# Patient Record
Sex: FEMALE | Race: WHITE | NOT HISPANIC OR LATINO | Employment: OTHER | URBAN - METROPOLITAN AREA
[De-identification: names, ages, dates, MRNs, and addresses within clinical notes are randomized per-mention and may not be internally consistent; named-entity substitution may affect disease eponyms.]

---

## 2023-02-23 LAB
APPEARANCE, URINE: CLEAR
BACTERIA, URINE: ABNORMAL /HPF
BILIRUBIN, URINE: NEGATIVE
BLOOD, URINE: ABNORMAL
COLOR, URINE: YELLOW
GLUCOSE, URINE: NEGATIVE MG/DL
KETONES, URINE: NEGATIVE MG/DL
LEUKOCYTE ESTERASE, URINE: ABNORMAL
NITRITE, URINE: NEGATIVE
PH, URINE: 6.5 (ref 5–8)
PROTEIN, URINE: NEGATIVE MG/DL
RBC, URINE: 1 /HPF (ref 0–5)
RENAL EPITHELIAL CELLS, URINE: <1 /HPF
SPECIFIC GRAVITY, URINE: 1.01 (ref 1–1.03)
UROBILINOGEN, URINE: <2 MG/DL (ref 0–1.9)
WBC CLUMPS, URINE: ABNORMAL /HPF
WBC, URINE: 29 /HPF (ref 0–5)

## 2023-02-25 LAB — URINE CULTURE: ABNORMAL

## 2023-03-08 PROBLEM — M54.12 CERVICAL RADICULITIS: Status: ACTIVE | Noted: 2023-03-08

## 2023-03-08 PROBLEM — N76.0 BACTERIAL VAGINOSIS: Status: ACTIVE | Noted: 2023-03-08

## 2023-03-08 PROBLEM — R00.2 PALPITATIONS: Status: ACTIVE | Noted: 2023-03-08

## 2023-03-08 PROBLEM — N20.0 KIDNEY STONES, CALCIUM OXALATE: Status: ACTIVE | Noted: 2023-03-08

## 2023-03-08 PROBLEM — S62.024A CLOSED NONDISPLACED FRACTURE OF MIDDLE THIRD OF SCAPHOID BONE OF RIGHT WRIST: Status: ACTIVE | Noted: 2023-03-08

## 2023-03-08 PROBLEM — R52 BODY ACHES: Status: ACTIVE | Noted: 2023-03-08

## 2023-03-08 PROBLEM — L03.90 WOUND CELLULITIS: Status: ACTIVE | Noted: 2023-03-08

## 2023-03-08 PROBLEM — S93.601A FOOT SPRAIN, RIGHT, INITIAL ENCOUNTER: Status: ACTIVE | Noted: 2023-03-08

## 2023-03-08 PROBLEM — J30.2 SEASONAL ALLERGIES: Status: ACTIVE | Noted: 2023-03-08

## 2023-03-08 PROBLEM — F43.10 PTSD (POST-TRAUMATIC STRESS DISORDER): Status: ACTIVE | Noted: 2023-03-08

## 2023-03-08 PROBLEM — G89.29 CHRONIC LEFT UPPER QUADRANT PAIN: Status: ACTIVE | Noted: 2023-03-08

## 2023-03-08 PROBLEM — J02.9 SORE THROAT: Status: ACTIVE | Noted: 2023-03-08

## 2023-03-08 PROBLEM — F33.1 MODERATE EPISODE OF RECURRENT MAJOR DEPRESSIVE DISORDER (MULTI): Status: ACTIVE | Noted: 2023-03-08

## 2023-03-08 PROBLEM — R10.2 PELVIC PAIN IN FEMALE: Status: ACTIVE | Noted: 2023-03-08

## 2023-03-08 PROBLEM — R45.86 MOOD CHANGES: Status: ACTIVE | Noted: 2023-03-08

## 2023-03-08 PROBLEM — M54.2 NECK PAIN: Status: ACTIVE | Noted: 2023-03-08

## 2023-03-08 PROBLEM — H00.011 HORDEOLUM EXTERNUM OF RIGHT UPPER EYELID: Status: ACTIVE | Noted: 2023-03-08

## 2023-03-08 PROBLEM — N39.0 UTI (URINARY TRACT INFECTION): Status: ACTIVE | Noted: 2023-03-08

## 2023-03-08 PROBLEM — F17.200 NICOTINE DEPENDENCE: Status: ACTIVE | Noted: 2023-03-08

## 2023-03-08 PROBLEM — N64.4 BREAST PAIN, LEFT: Status: ACTIVE | Noted: 2023-03-08

## 2023-03-08 PROBLEM — B37.0 ORAL THRUSH: Status: ACTIVE | Noted: 2023-03-08

## 2023-03-08 PROBLEM — E78.5 HYPERLIPIDEMIA: Status: ACTIVE | Noted: 2023-03-08

## 2023-03-08 PROBLEM — M25.531 ARTHRALGIA OF RIGHT WRIST: Status: ACTIVE | Noted: 2023-03-08

## 2023-03-08 PROBLEM — R51.9 HEADACHE: Status: ACTIVE | Noted: 2023-03-08

## 2023-03-08 PROBLEM — R92.30 DENSE BREAST TISSUE ON MAMMOGRAM: Status: ACTIVE | Noted: 2023-03-08

## 2023-03-08 PROBLEM — R39.89 POSSIBLE URINARY TRACT INFECTION: Status: ACTIVE | Noted: 2023-03-08

## 2023-03-08 PROBLEM — I95.9 HYPOTENSION: Status: ACTIVE | Noted: 2023-03-08

## 2023-03-08 PROBLEM — R05.9 COUGH: Status: ACTIVE | Noted: 2023-03-08

## 2023-03-08 PROBLEM — M79.603 ARM PAIN: Status: ACTIVE | Noted: 2023-03-08

## 2023-03-08 PROBLEM — N95.1 VASOMOTOR SYMPTOMS DUE TO MENOPAUSE: Status: ACTIVE | Noted: 2023-03-08

## 2023-03-08 PROBLEM — M47.812 CERVICAL FACET SYNDROME: Status: ACTIVE | Noted: 2023-03-08

## 2023-03-08 PROBLEM — R10.12 CHRONIC LEFT UPPER QUADRANT PAIN: Status: ACTIVE | Noted: 2023-03-08

## 2023-03-08 PROBLEM — B96.89 BACTERIAL VAGINOSIS: Status: ACTIVE | Noted: 2023-03-08

## 2023-03-08 PROBLEM — R59.1 LYMPHADENOPATHY: Status: ACTIVE | Noted: 2023-03-08

## 2023-03-08 PROBLEM — N63.20 LEFT BREAST MASS: Status: ACTIVE | Noted: 2023-03-08

## 2023-03-08 PROBLEM — R42 FEELING FAINT: Status: ACTIVE | Noted: 2023-03-08

## 2023-03-08 PROBLEM — F41.1 GENERALIZED ANXIETY DISORDER: Status: ACTIVE | Noted: 2023-03-08

## 2023-03-08 PROBLEM — R30.0 DYSURIA: Status: ACTIVE | Noted: 2023-03-08

## 2023-03-08 PROBLEM — B37.9 YEAST INFECTION: Status: ACTIVE | Noted: 2023-03-08

## 2023-03-08 PROBLEM — N93.9 ABNORMAL UTERINE BLEEDING: Status: ACTIVE | Noted: 2023-03-08
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
## 2023-03-08 PROBLEM — K63.89 BACTERIAL OVERGROWTH SYNDROME: Status: ACTIVE | Noted: 2023-03-08

## 2023-03-08 PROBLEM — G89.18 POST-OPERATIVE PAIN: Status: ACTIVE | Noted: 2023-03-08

## 2023-03-08 PROBLEM — H57.89 EYE IRRITATION: Status: ACTIVE | Noted: 2023-03-08

## 2023-03-08 PROBLEM — E55.9 VITAMIN D DEFICIENCY: Status: ACTIVE | Noted: 2023-03-08

## 2023-03-08 PROBLEM — N20.0 NEPHROLITHIASIS: Status: ACTIVE | Noted: 2023-03-08

## 2023-03-08 PROBLEM — N80.9 ENDOMETRIOSIS: Status: ACTIVE | Noted: 2023-03-08

## 2023-03-08 PROBLEM — L08.9 SKIN INFECTION: Status: ACTIVE | Noted: 2023-03-08

## 2023-03-08 PROBLEM — N97.0 ANOVULATION: Status: ACTIVE | Noted: 2023-03-08

## 2023-03-08 PROBLEM — R53.83 FATIGUE: Status: ACTIVE | Noted: 2023-03-08

## 2023-03-08 PROBLEM — M79.671 RIGHT FOOT PAIN: Status: ACTIVE | Noted: 2023-03-08

## 2023-03-08 PROBLEM — F12.90 MARIJUANA USE: Status: ACTIVE | Noted: 2023-03-08

## 2023-03-08 PROBLEM — R63.4 WEIGHT LOSS: Status: ACTIVE | Noted: 2023-03-08

## 2023-03-08 PROBLEM — T14.8XXA BRUISING: Status: ACTIVE | Noted: 2023-03-08

## 2023-03-08 PROBLEM — H00.019 STYE: Status: ACTIVE | Noted: 2023-03-08

## 2023-03-08 PROBLEM — R79.9 ABNORMAL BLOOD CHEMISTRY: Status: ACTIVE | Noted: 2023-03-08

## 2023-03-08 PROBLEM — L25.9 CONTACT DERMATITIS: Status: ACTIVE | Noted: 2023-03-08

## 2023-03-08 PROBLEM — J32.9 CHRONIC SINUS INFECTION: Status: ACTIVE | Noted: 2023-03-08

## 2023-03-08 PROBLEM — R10.9 PAIN, FLANK, BILATERAL: Status: ACTIVE | Noted: 2023-03-08

## 2023-03-08 PROBLEM — R11.0 NAUSEA IN ADULT: Status: ACTIVE | Noted: 2023-03-08

## 2023-03-08 PROBLEM — R55 SYNCOPE: Status: ACTIVE | Noted: 2023-03-08

## 2023-03-08 PROBLEM — M25.511 RIGHT SHOULDER PAIN: Status: ACTIVE | Noted: 2023-03-08

## 2023-03-08 PROBLEM — R26.89 BALANCE DISORDER: Status: ACTIVE | Noted: 2023-03-08

## 2023-03-08 PROBLEM — M62.838 NECK MUSCLE SPASM: Status: ACTIVE | Noted: 2023-03-08

## 2023-03-08 RX ORDER — IBUPROFEN 200 MG
TABLET ORAL
COMMUNITY
Start: 2018-08-09 | End: 2024-04-09 | Stop reason: WASHOUT

## 2023-03-08 RX ORDER — ATORVASTATIN CALCIUM 40 MG/1
1 TABLET, FILM COATED ORAL NIGHTLY
COMMUNITY
Start: 2020-12-04 | End: 2024-01-17 | Stop reason: SDUPTHER

## 2023-03-08 RX ORDER — ACETAMINOPHEN 325 MG/1
TABLET ORAL
COMMUNITY
Start: 2018-08-09 | End: 2024-04-09 | Stop reason: WASHOUT

## 2023-03-08 RX ORDER — HYDROXYZINE PAMOATE 50 MG/1
50 CAPSULE ORAL EVERY 6 HOURS PRN
COMMUNITY
End: 2023-11-22 | Stop reason: SDUPTHER

## 2023-03-08 RX ORDER — NITROFURANTOIN 25; 75 MG/1; MG/1
100 CAPSULE ORAL
COMMUNITY
End: 2023-10-18 | Stop reason: ALTCHOICE

## 2023-03-08 RX ORDER — ARIPIPRAZOLE 15 MG/1
1 TABLET ORAL DAILY
COMMUNITY
Start: 2020-06-08 | End: 2023-05-19 | Stop reason: SDUPTHER

## 2023-03-08 RX ORDER — CLONAZEPAM 1 MG/1
1 TABLET ORAL 3 TIMES DAILY PRN
COMMUNITY
Start: 2020-04-20 | End: 2023-11-22 | Stop reason: SDUPTHER

## 2023-03-10 ENCOUNTER — OFFICE VISIT (OUTPATIENT)
Dept: PRIMARY CARE | Facility: CLINIC | Age: 38
End: 2023-03-10
Payer: MEDICARE

## 2023-03-10 VITALS
SYSTOLIC BLOOD PRESSURE: 120 MMHG | BODY MASS INDEX: 24.11 KG/M2 | HEIGHT: 66 IN | HEART RATE: 105 BPM | DIASTOLIC BLOOD PRESSURE: 82 MMHG | WEIGHT: 150 LBS

## 2023-03-10 DIAGNOSIS — S06.0X0A CONCUSSION WITHOUT LOSS OF CONSCIOUSNESS, INITIAL ENCOUNTER: Primary | ICD-10-CM

## 2023-03-10 DIAGNOSIS — R11.0 NAUSEA: ICD-10-CM

## 2023-03-10 PROCEDURE — 99214 OFFICE O/P EST MOD 30 MIN: CPT | Performed by: STUDENT IN AN ORGANIZED HEALTH CARE EDUCATION/TRAINING PROGRAM

## 2023-03-10 RX ORDER — ONDANSETRON HYDROCHLORIDE 8 MG/1
8 TABLET, FILM COATED ORAL EVERY 12 HOURS PRN
Qty: 20 TABLET | Refills: 0 | Status: SHIPPED | OUTPATIENT
Start: 2023-03-10 | End: 2023-03-20

## 2023-03-10 NOTE — PROGRESS NOTES
"Subjective   Patient ID: Rosibel Staton is a 37 y.o. female who presents for Concussion.    HPI     At approximately 8 PM last night she was playing with her son, she tried to push him away with her leg, her son grabbed her leg and she fell backwards and hit her head on a table.  It has since been approximately 15 hours until she was seen in the office since the incident.  She did not pass out or vomit on impact or within 30 minutes after incident.  She does admit that she did vomit approximately 3 hours after the incident, and has since vomited 3 times in total.  She has noticed no blood in her vomit.  Admits to could feel nauseous consistently, she has head and neck pain.  She did not go to the emergency room.  She was able to sleep last night, waking up at 3 in the morning to go to the bathroom, she did vomit at that time.  She then went back to sleep and woke up at 8 AM this morning.  She vomited on waking.  Since then, whenever she looks at a screen, sees bright lights her symptoms return and worsen.    PMH: History of migraines, a prior concussion, and had CT scans done in the past.    Review of Systems  All pertinent positive symptoms are included in the history of present illness.     All other systems have been reviewed and are negative and noncontributory to this patient's current ailments.  Objective   /82 (BP Location: Left arm, Patient Position: Sitting, BP Cuff Size: Adult)   Pulse 105   Ht 1.676 m (5' 6\")   Wt 68 kg (150 lb)   BMI 24.21 kg/m²     Physical Exam  CONSTITUTIONAL - INAD. Not ill appearing.  SKIN - No lesions or rashes visualized. Good skin turgor.  HEENT- EACs clear, TMs are not injected and without effusion bilaterally, nasal turbinates are nonerythematous and without drainage, oropharynx is nonerythematous and without exudates.   HEAD -there is edema on the top left portion of her head, tender to palpation.  No ecchymosis visible.  Neck -mild tenderness just left of the " cervical spine.  RESP - CTAB. No wheezing, rhonchi, or crackles.   CARDIAC - RRR. No murmurs, gallops, or rubs.  Neuro - Cranial nerves II through XII are intact.  No numbness or tingling.  Deferred Romberg test she is uneasy on her feet.  Extremities -no numbness and tingling of her upper or lower extremities, strength 5 out of 5.  Psych -monotone in speech, poor affect    Assessment/Plan   We reviewed that individuals who suffer a concussion will be at increased likelihood for suffering additional concussions in the future. The family needs to be attentive to the individual should they hit their head again.     Treatment will include:    1.  Recommend to try and avoid medications at this time.  We did provide a prescription for Zofran 8 mg to take as needed.  If possible try not to take this medication.    2. Electronic restrictions: No video game, computer, tablet. Quiet television for no more than two 30 minute sessions per day is allowed. Limiting texting and cell phone use was also discussed.    3. No physical activity is allowed. No weight training, conditioning, stretching, or any other physical activity without supervision. This can exacerbate symptoms by increasing the blood flow to the brain. It will slow the recovery process, therefore it is not recommended at this time.    4. We reviewed good sleep habits including remaining on a good sleep schedule. This includes restricting daytime napping.    5. Sunglasses and a hat can be used for light sensitivity. Earplugs were recommended for noise sensitivity.    I like to have you follow-up in office in 1 week.    If you develop any numbness or tingling, worsening changes in your vision or symptoms please go to the emergency room immediately to be further evaluated.

## 2023-03-13 ENCOUNTER — APPOINTMENT (OUTPATIENT)
Dept: PRIMARY CARE | Facility: CLINIC | Age: 38
End: 2023-03-13
Payer: MEDICARE

## 2023-03-17 ENCOUNTER — OFFICE VISIT (OUTPATIENT)
Dept: PRIMARY CARE | Facility: CLINIC | Age: 38
End: 2023-03-17
Payer: MEDICARE

## 2023-03-17 VITALS
DIASTOLIC BLOOD PRESSURE: 76 MMHG | HEART RATE: 82 BPM | WEIGHT: 150 LBS | SYSTOLIC BLOOD PRESSURE: 112 MMHG | HEIGHT: 66 IN | BODY MASS INDEX: 24.11 KG/M2

## 2023-03-17 DIAGNOSIS — G89.29 CHRONIC ABDOMINAL PAIN: ICD-10-CM

## 2023-03-17 DIAGNOSIS — S06.0X0D CONCUSSION WITHOUT LOSS OF CONSCIOUSNESS, SUBSEQUENT ENCOUNTER: Primary | ICD-10-CM

## 2023-03-17 DIAGNOSIS — R14.0 BLOATING SYMPTOM: ICD-10-CM

## 2023-03-17 DIAGNOSIS — R10.9 CHRONIC ABDOMINAL PAIN: ICD-10-CM

## 2023-03-17 PROCEDURE — 99214 OFFICE O/P EST MOD 30 MIN: CPT | Performed by: STUDENT IN AN ORGANIZED HEALTH CARE EDUCATION/TRAINING PROGRAM

## 2023-03-17 RX ORDER — DICYCLOMINE HYDROCHLORIDE 10 MG/1
10 CAPSULE ORAL 4 TIMES DAILY PRN
Qty: 30 CAPSULE | Refills: 1 | Status: SHIPPED | OUTPATIENT
Start: 2023-03-17 | End: 2023-10-18 | Stop reason: ALTCHOICE

## 2023-03-17 NOTE — PROGRESS NOTES
"Subjective   Patient ID: Rosibel Staton is a 37 y.o. female who presents for Follow-up for concussion and concerns as noted below.    HPI     Concussion  She was last seen in the office approximately 1 week ago.  She did fill out a concussion symptom score sheet with the previous score of 84, and now is a score 4.  She continues to have a headache and nausea.  She states that for the first couple of days her symptoms are pretty severe, but then gradually improved.  She is not able to scroll on her phone without any symptoms.  She has not needed to take Tylenol or Zofran to help with her pain.  Denies feeling unsteady on her feet, or any numbness or tingling down her arms or legs.  She did not vomit since the last appointment.    2.  Chronic abdominal pain  She points that she feels that her seatbelt and clothing do not feel right lower abdomen.  She is not sure if this is a true bloating sensation. History of endometriosis and oophorectomies, colonoscopy and endoscopy that were normal back in 2018.  Denies constipation or diarrhea.  She has a history of endometriosis.  Admits to feeling nauseous and denies heartburn.   Review of Systems  All pertinent positive symptoms are included in the history of present illness.     All other systems have been reviewed and are negative and noncontributory to this patient's current ailments.  Objective   /76 (BP Location: Left arm, Patient Position: Sitting, BP Cuff Size: Adult)   Pulse 82   Ht 1.676 m (5' 6\")   Wt 68 kg (150 lb)   BMI 24.21 kg/m²     Physical Exam  CONSTITUTIONAL - INAD. Not ill appearing.  SKIN - No lesions or rashes visualized. Good skin turgor.  HEENT- PERRLA, EOMI, clear sclera. nasal turbinates are nonerythematous and without drainage, oropharynx is nonerythematous and without exudates.  HEAD - Atraumatic, normocephalic..  RESP - CTAB. No wheezing, rhonchi, or crackles.   CARDIAC - RRR. No murmurs, gallops, or rubs.  ABDOMEN - Soft, nontender, " distended. No organomegaly. Negative McBurney sign and negative Nevarez sign  Neuro -cranial nerves II through XII intact, no numbness or tingling of her legs or arms.    Assessment/Plan     Concussion  His symptoms have significantly improved.  He is okay to take Tylenol, ibuprofen, or Zofran to help with any lingering symptoms.  I would recommend gradual increase of activities, and if your symptoms return or worsen to follow-up in the office.    Chronic abdominal pain/ Bloating  We discussed possible etiologies of your symptoms.  Due to the possibility of some bloating and some IBS like symptoms, we discussed trying Bentyl to see if we can get you some relief.  I recommend monitoring her food intake and diet to see if there is any patterns of foods that may be intolerant.

## 2023-04-14 ENCOUNTER — TELEMEDICINE (OUTPATIENT)
Dept: PRIMARY CARE | Facility: CLINIC | Age: 38
End: 2023-04-14
Payer: MEDICARE

## 2023-04-14 DIAGNOSIS — G43.809 OTHER MIGRAINE WITHOUT STATUS MIGRAINOSUS, NOT INTRACTABLE: ICD-10-CM

## 2023-04-14 DIAGNOSIS — U07.1 COVID-19: Primary | ICD-10-CM

## 2023-04-14 PROCEDURE — 99213 OFFICE O/P EST LOW 20 MIN: CPT | Performed by: STUDENT IN AN ORGANIZED HEALTH CARE EDUCATION/TRAINING PROGRAM

## 2023-04-14 RX ORDER — SUMATRIPTAN 50 MG/1
50 TABLET, FILM COATED ORAL ONCE AS NEEDED
Qty: 9 TABLET | Refills: 0 | Status: SHIPPED | OUTPATIENT
Start: 2023-04-14 | End: 2023-08-30 | Stop reason: SDUPTHER

## 2023-04-14 RX ORDER — METHYLPREDNISOLONE 4 MG/1
TABLET ORAL
Qty: 21 TABLET | Refills: 0 | Status: SHIPPED | OUTPATIENT
Start: 2023-04-14 | End: 2023-04-28 | Stop reason: ALTCHOICE

## 2023-04-14 NOTE — PROGRESS NOTES
Subjective   Patient ID: Rosibel Staton is a 37 y.o. female who presents for Migraine.  Today she is accompanied by alone.     HPI  1. COVID 19 Infection/Migraine  Patient has a 5 day history of congestion, migraine, dizziness, chills, and nausea. [facility]. The started her on lagevrio for her to complete over the next 5 days. She has no started to experience muscle aches and cramps. She denies any fever, shortness of breath, chest pain/tightness or rhinorrhea  at this time.  The migraine is refractory to NSAID's and Tylenol. Her biggest concern today is to find a way to relieve her headache.     Current Outpatient Medications on File Prior to Visit   Medication Sig Dispense Refill    acetaminophen (TylenoL) 325 mg tablet Take by mouth.      ARIPiprazole (Abilify) 15 mg tablet Take 1 tablet (15 mg) by mouth once daily.      atorvastatin (Lipitor) 40 mg tablet Take 1 tablet (40 mg) by mouth once daily at bedtime.      clonazePAM (KlonoPIN) 1 mg tablet Take 1 tablet (1 mg) by mouth 3 times a day as needed.      dicyclomine (Bentyl) 10 mg capsule Take 1 capsule (10 mg) by mouth 4 times a day as needed (abdominal pain or cramps). 30 capsule 1    hydrOXYzine pamoate (Vistaril) 50 mg capsule Take 1 capsule (50 mg) by mouth every 6 hours if needed for anxiety.      ibuprofen 200 mg tablet Take by mouth.      nitrofurantoin, macrocrystal-monohydrate, (Macrobid) 100 mg capsule Take 1 capsule (100 mg) by mouth in the morning and 1 capsule (100 mg) in the evening. Take with meals.       No current facility-administered medications on file prior to visit.        Allergies   Allergen Reactions    Azithromycin Unknown    Bacitracin Zinc-Polymyxin B Unknown    Ciprofloxacin Unknown    Citalopram Unknown    Duloxetine Unknown    Gabapentin Unknown    Gadolinium-Containing Contrast Media Unknown    Lithium Unknown    Nickel Unknown    Nitrofurantoin Monohyd/M-Cryst  Unknown    Prednisone Unknown    Quetiapine Unknown    Sertraline Unknown    Sulfamethoxazole-Trimethoprim Unknown         Review of Systems  All pertinent positive symptoms are included in the history of present illness.  All other systems have been reviewed and are negative and noncontributory to this patient's current ailments.     Objective   There were no vitals taken for this visit.  BSA: There is no height or weight on file to calculate BSA.      Physical Exam  CONSTITUTIONAL - well nourished, well developed, looks like stated age, in no acute distress, not ill-appearing, and not tired appearing  SKIN - normal skin color and pigmentation, normal skin turgor without rash, lesions, or nodules visualized  HEAD - no trauma, normocephalic  EYES - normal external exam  CHEST -no distressed breathing, good effort  NEUROLOGICAL - normal balance, normal motor, no ataxia  PSYCHIATRIC - alert, pleasant and cordial, age-appropriate     Assessment/Plan   1. COVID 19 Infection/Migraine  A prescription for Sumatriptan and methylprednisolone has been sent to your pharmacy  If your migraine does not get better or worsens please reach out to office

## 2023-04-28 ENCOUNTER — TELEMEDICINE (OUTPATIENT)
Dept: PRIMARY CARE | Facility: CLINIC | Age: 38
End: 2023-04-28
Payer: MEDICARE

## 2023-04-28 DIAGNOSIS — G43.809 OTHER MIGRAINE WITHOUT STATUS MIGRAINOSUS, NOT INTRACTABLE: Primary | ICD-10-CM

## 2023-04-28 DIAGNOSIS — U07.1 COVID-19: ICD-10-CM

## 2023-04-28 PROCEDURE — 96372 THER/PROPH/DIAG INJ SC/IM: CPT | Performed by: STUDENT IN AN ORGANIZED HEALTH CARE EDUCATION/TRAINING PROGRAM

## 2023-04-28 PROCEDURE — 99214 OFFICE O/P EST MOD 30 MIN: CPT | Performed by: STUDENT IN AN ORGANIZED HEALTH CARE EDUCATION/TRAINING PROGRAM

## 2023-04-28 RX ORDER — CYCLOBENZAPRINE HCL 5 MG
5 TABLET ORAL 3 TIMES DAILY
Qty: 30 TABLET | Refills: 0 | Status: SHIPPED | OUTPATIENT
Start: 2023-04-28 | End: 2023-05-08 | Stop reason: SDUPTHER

## 2023-04-28 RX ORDER — METHYLPREDNISOLONE SODIUM SUCCINATE 125 MG/2ML
125 INJECTION INTRAMUSCULAR; INTRAVENOUS ONCE
Status: COMPLETED | OUTPATIENT
Start: 2023-04-28 | End: 2023-04-28

## 2023-04-28 RX ORDER — METHYLPREDNISOLONE 4 MG/1
TABLET ORAL
Qty: 21 TABLET | Refills: 0 | Status: SHIPPED | OUTPATIENT
Start: 2023-04-28 | End: 2023-10-18 | Stop reason: ALTCHOICE

## 2023-04-28 RX ADMIN — METHYLPREDNISOLONE SODIUM SUCCINATE 125 MG: 125 INJECTION INTRAMUSCULAR; INTRAVENOUS at 11:50

## 2023-04-28 NOTE — PROGRESS NOTES
Subjective   Patient ID: Rosibel Staton is a 37 y.o. female who presents for Migraine.    HPI   Patient is presenting to the office today via initially a telemedicine virtual visit that ultimately ended up to be an in person visit due to signs/symptoms of off-and-on migraines that are not fully resolving    Unfortunately she had COVID 3.5 weeks ago and did ultimately follow-up with one of my partners on April 14, 2023 due to worsening headaches and migraines    At that time she was provided sumatriptan as well as a Medrol Dosepak    Sumatriptan did not help at all but she did state that the Medrol Dosepak did help slightly    She continues to have off-and-on headaches and even dull signs/symptoms and wishes to discuss this further at today's visit    Asking for guidance as this continues to be an issue    Review of Systems  All pertinent positive symptoms are included in the history of present illness.    All other systems have been reviewed and are negative and noncontributory to this patient's current ailments.    Objective   There were no vitals taken for this visit.    Physical Exam  CONSTITUTIONAL - well nourished, well developed, looks like stated age, appears slightly fatigued and uncomfortable  SKIN - normal skin color and pigmentation, normal skin turgor without rash, lesions, or nodules visualized  HEAD - no trauma, normocephalic  EYES - normal external exam  CHEST -no distressed breathing, good effort  EXTREMITIES - no edema, no deformities  NEUROLOGICAL - normal balance, normal motor, no ataxia  PSYCHIATRIC - alert, pleasant and cordial, age-appropriate    Assessment/Plan   We had a long discussion about your signs/symptoms and I did send over another Medrol Dosepak as well as Flexeril to use on an as-needed basis    We had you come into the office today so we can provided Solu-Medrol 125 mg shot to see if we can kick start and alleviate the symptom quicker    The risk and benefits were discussed with  you today and I would recommend you start the Medrol Dosepak tomorrow    Please contact the office within the next 3-5 days if continued or even worsening signs/symptoms occur    We will discuss further treatment options and recommendations at that time

## 2023-05-03 DIAGNOSIS — R11.0 NAUSEA: ICD-10-CM

## 2023-05-03 RX ORDER — ONDANSETRON 4 MG/1
8 TABLET, FILM COATED ORAL EVERY 12 HOURS PRN
Qty: 20 TABLET | Refills: 0 | Status: SHIPPED | OUTPATIENT
Start: 2023-05-03 | End: 2023-06-16 | Stop reason: SDUPTHER

## 2023-05-03 RX ORDER — ONDANSETRON 4 MG/1
8 TABLET, FILM COATED ORAL EVERY 12 HOURS PRN
COMMUNITY
Start: 2021-07-03 | End: 2023-05-03 | Stop reason: SDUPTHER

## 2023-05-08 ENCOUNTER — TELEPHONE (OUTPATIENT)
Dept: PRIMARY CARE | Facility: CLINIC | Age: 38
End: 2023-05-08
Payer: MEDICARE

## 2023-05-08 DIAGNOSIS — G43.809 OTHER MIGRAINE WITHOUT STATUS MIGRAINOSUS, NOT INTRACTABLE: ICD-10-CM

## 2023-05-08 RX ORDER — CYCLOBENZAPRINE HCL 5 MG
5 TABLET ORAL 3 TIMES DAILY
Qty: 30 TABLET | Refills: 0 | Status: SHIPPED | OUTPATIENT
Start: 2023-05-08 | End: 2023-05-12 | Stop reason: SDUPTHER

## 2023-05-08 NOTE — TELEPHONE ENCOUNTER
Pt called in to let us know she is doing a lot better since starting Flexeril just a slight headache. She is asking for additional days of this as it is working very well for her

## 2023-05-12 ENCOUNTER — TELEMEDICINE (OUTPATIENT)
Dept: PRIMARY CARE | Facility: CLINIC | Age: 38
End: 2023-05-12
Payer: MEDICARE

## 2023-05-12 DIAGNOSIS — G43.709 CHRONIC MIGRAINE WITHOUT AURA WITHOUT STATUS MIGRAINOSUS, NOT INTRACTABLE: Primary | ICD-10-CM

## 2023-05-12 PROCEDURE — 99214 OFFICE O/P EST MOD 30 MIN: CPT | Performed by: STUDENT IN AN ORGANIZED HEALTH CARE EDUCATION/TRAINING PROGRAM

## 2023-05-12 RX ORDER — CYCLOBENZAPRINE HCL 5 MG
5 TABLET ORAL 3 TIMES DAILY
Qty: 30 TABLET | Refills: 0 | Status: SHIPPED | OUTPATIENT
Start: 2023-05-12 | End: 2023-08-30 | Stop reason: SDUPTHER

## 2023-05-12 NOTE — PROGRESS NOTES
"Subjective   Patient ID: Rosibel Staton is a 38 y.o. female who presents for Medication Discussion.  Today she is accompanied by alone.     HPI    Since her most recent episode of COVID, she has been dealing with chronic headaches.  She does admit that she gets migraines without auras.  She will have nausea, \"head feeling like it will pop off,\" light sensitivity, and sound sensitivity.  Recently she was given a prescription for Flexeril which has been helping with her symptoms.  Denies fatigue with the medication.  She has not had a migraine, but continues to have moderate to severe headaches in the afternoon/evening.  These usually are not associated with nausea, light simply or loud noises for the last 5 days.    Denies numbness, tingling, incontinence, weakness, or loss of vision, or dysarthria.  Admits that she feels like she is in a brain fog and has been having more difficulty with her memory.    In the past she had seen a neurologist and was started on injectable sumatriptan and Botox to help with her migraines.  These did seem to help, but she stopped going.  To have a referral to neurology to also help with her symptoms.    Current Outpatient Medications on File Prior to Visit   Medication Sig Dispense Refill    acetaminophen (TylenoL) 325 mg tablet Take by mouth.      ARIPiprazole (Abilify) 15 mg tablet Take 1 tablet (15 mg) by mouth once daily.      atorvastatin (Lipitor) 40 mg tablet Take 1 tablet (40 mg) by mouth once daily at bedtime.      clonazePAM (KlonoPIN) 1 mg tablet Take 1 tablet (1 mg) by mouth 3 times a day as needed.      cyclobenzaprine (Flexeril) 5 mg tablet Take 1 tablet (5 mg) by mouth 3 times a day. 30 tablet 0    dicyclomine (Bentyl) 10 mg capsule Take 1 capsule (10 mg) by mouth 4 times a day as needed (abdominal pain or cramps). 30 capsule 1    hydrOXYzine pamoate (Vistaril) 50 mg capsule Take 1 capsule (50 mg) by mouth every 6 hours if needed for anxiety.      ibuprofen 200 mg " tablet Take by mouth.      methylPREDNISolone (Medrol Dospak) 4 mg tablets Take as directed on package. 21 tablet 0    nitrofurantoin, macrocrystal-monohydrate, (Macrobid) 100 mg capsule Take 1 capsule (100 mg) by mouth in the morning and 1 capsule (100 mg) in the evening. Take with meals.      ondansetron (Zofran) 4 mg tablet Take 2 tablets (8 mg) by mouth every 12 hours if needed for nausea. 20 tablet 0    SUMAtriptan (Imitrex) 50 mg tablet Take 1 tablet (50 mg) by mouth 1 time if needed for migraine. May repeat after 2 hours. 9 tablet 0    [DISCONTINUED] cyclobenzaprine (Flexeril) 5 mg tablet Take 1 tablet (5 mg) by mouth 3 times a day. 30 tablet 0     No current facility-administered medications on file prior to visit.        Allergies   Allergen Reactions    Azithromycin Unknown    Bacitracin Zinc-Polymyxin B Unknown    Ciprofloxacin Unknown    Citalopram Unknown    Duloxetine Unknown    Gabapentin Unknown    Gadolinium-Containing Contrast Media Unknown    Lithium Unknown    Nickel Unknown    Nitrofurantoin Monohyd/M-Cryst Unknown    Prednisone Unknown    Quetiapine Unknown    Sertraline Unknown    Sulfamethoxazole-Trimethoprim Unknown         There is no immunization history on file for this patient.      Review of Systems  All pertinent positive symptoms are included in the history of present illness.  All other systems have been reviewed and are negative and noncontributory to this patient's current ailments.     Objective   There were no vitals taken for this visit.  BSA: There is no height or weight on file to calculate BSA.  No visits with results within 1 Month(s) from this visit.   Latest known visit with results is:   Orders Only on 02/23/2023   Component Date Value Ref Range Status    WBC, Urine 02/23/2023 29 (A)  0 - 5 /HPF Final    WBC Clumps, Urine 02/23/2023 OCC  /HPF Final    RBC, Urine 02/23/2023 1  0 - 5 /HPF Final    Renal Epithalial Cells, Urine 02/23/2023 <1  /HPF Final    Bacteria, Urine  02/23/2023 1+ (A)  /HPF Final       Physical Exam  CONSTITUTIONAL - well nourished, well developed, looks like stated age, in no acute distress, not ill-appearing, and not tired appearing  SKIN - normal skin color and pigmentation, normal skin turgor without rash, lesions, or nodules visualized  HEAD - no trauma, normocephalic  EYES - normal external exam  CHEST -no distressed breathing, good effort  EXTREMITIES - no edema, no deformities  NEUROLOGICAL - normal balance, normal motor, no ataxia  PSYCHIATRIC - alert, pleasant and cordial, age-appropriate  Assessment/Plan     We discussed through extensive length of possible treatments for your migraines.    Due to the complicated nature of this I do recommend getting neurology to help with your symptoms.  A referral has been placed.  Recommend he can call and schedule an appointment at your earliest convenience.    Since Flexeril has been helping with your symptoms, I recommended a trial over the weekend of increasing your dose to 10 mg every 8 hours.  I will provide a 5 mg dose prescription to take 2 tablets every time in case you find that you are feeling too fatigued secondary to the increase in the Flexeril dose.    Please call the office early next week to let us know how you are tolerating the higher dose.  We can send over a prescription for the Flexeril 10 mg to be taken every 12 hours as needed.

## 2023-05-19 ENCOUNTER — TELEPHONE (OUTPATIENT)
Dept: PRIMARY CARE | Facility: CLINIC | Age: 38
End: 2023-05-19
Payer: MEDICARE

## 2023-05-19 DIAGNOSIS — F33.1 MODERATE EPISODE OF RECURRENT MAJOR DEPRESSIVE DISORDER (MULTI): ICD-10-CM

## 2023-05-19 DIAGNOSIS — F43.10 PTSD (POST-TRAUMATIC STRESS DISORDER): ICD-10-CM

## 2023-05-19 RX ORDER — ARIPIPRAZOLE 15 MG/1
15 TABLET ORAL DAILY
Qty: 30 TABLET | Refills: 0 | Status: SHIPPED | OUTPATIENT
Start: 2023-05-19 | End: 2023-11-22 | Stop reason: SDUPTHER

## 2023-05-19 NOTE — TELEPHONE ENCOUNTER
Pt has left multiple messages for her psychiatrist about getting a refill on her Abilify and has not heard back. She has an apt with them on 5/26/23, are we able to fill this for her for a 30 day supply until she sees them next week?

## 2023-06-05 ENCOUNTER — LAB (OUTPATIENT)
Dept: LAB | Facility: LAB | Age: 38
End: 2023-06-05
Payer: MEDICARE

## 2023-06-05 ENCOUNTER — OFFICE VISIT (OUTPATIENT)
Dept: PRIMARY CARE | Facility: CLINIC | Age: 38
End: 2023-06-05
Payer: MEDICARE

## 2023-06-05 VITALS
WEIGHT: 141 LBS | HEIGHT: 66 IN | SYSTOLIC BLOOD PRESSURE: 100 MMHG | DIASTOLIC BLOOD PRESSURE: 70 MMHG | HEART RATE: 98 BPM | BODY MASS INDEX: 22.66 KG/M2

## 2023-06-05 DIAGNOSIS — R39.9 UTI SYMPTOMS: ICD-10-CM

## 2023-06-05 DIAGNOSIS — R39.9 UTI SYMPTOMS: Primary | ICD-10-CM

## 2023-06-05 DIAGNOSIS — Z20.2 POSSIBLE EXPOSURE TO STD: ICD-10-CM

## 2023-06-05 PROBLEM — G43.E09 CHRONIC MIGRAINE WITH AURA: Status: ACTIVE | Noted: 2023-06-05

## 2023-06-05 LAB
POC APPEARANCE, URINE: ABNORMAL
POC BILIRUBIN, URINE: NEGATIVE
POC BLOOD, URINE: NEGATIVE
POC COLOR, URINE: YELLOW
POC GLUCOSE, URINE: NEGATIVE MG/DL
POC KETONES, URINE: NEGATIVE MG/DL
POC LEUKOCYTES, URINE: ABNORMAL
POC NITRITE,URINE: NEGATIVE
POC PH, URINE: 7 PH
POC PROTEIN, URINE: NEGATIVE MG/DL
POC SPECIFIC GRAVITY, URINE: 1.02
POC UROBILINOGEN, URINE: 1 EU/DL

## 2023-06-05 PROCEDURE — 87389 HIV-1 AG W/HIV-1&-2 AB AG IA: CPT

## 2023-06-05 PROCEDURE — 99214 OFFICE O/P EST MOD 30 MIN: CPT | Performed by: FAMILY MEDICINE

## 2023-06-05 PROCEDURE — 87591 N.GONORRHOEAE DNA AMP PROB: CPT

## 2023-06-05 PROCEDURE — 87491 CHLMYD TRACH DNA AMP PROBE: CPT

## 2023-06-05 PROCEDURE — 81002 URINALYSIS NONAUTO W/O SCOPE: CPT | Performed by: FAMILY MEDICINE

## 2023-06-05 PROCEDURE — 36415 COLL VENOUS BLD VENIPUNCTURE: CPT

## 2023-06-05 PROCEDURE — 86592 SYPHILIS TEST NON-TREP QUAL: CPT

## 2023-06-05 PROCEDURE — 87086 URINE CULTURE/COLONY COUNT: CPT

## 2023-06-05 RX ORDER — NORETHINDRONE 5 MG/1
5 TABLET ORAL
COMMUNITY
Start: 2020-03-18 | End: 2023-10-18 | Stop reason: ALTCHOICE

## 2023-06-05 RX ORDER — LORAZEPAM 1 MG/1
TABLET ORAL
COMMUNITY
End: 2023-10-18 | Stop reason: ALTCHOICE

## 2023-06-05 RX ORDER — PHENAZOPYRIDINE HYDROCHLORIDE 200 MG/1
200 TABLET, FILM COATED ORAL 3 TIMES DAILY PRN
Qty: 6 TABLET | Refills: 0 | Status: SHIPPED | OUTPATIENT
Start: 2023-06-05 | End: 2023-06-07

## 2023-06-05 RX ORDER — FLUOXETINE HYDROCHLORIDE 40 MG/1
CAPSULE ORAL
COMMUNITY
End: 2023-07-19 | Stop reason: ALTCHOICE

## 2023-06-05 RX ORDER — PROPRANOLOL HYDROCHLORIDE 10 MG/1
TABLET ORAL
COMMUNITY
Start: 2023-05-17 | End: 2023-11-22 | Stop reason: SDUPTHER

## 2023-06-05 NOTE — ASSESSMENT & PLAN NOTE
UA only significant for trace leukocytes so it was sent for culture and sensitivity if necessary  Due to allergies to multiple antibiotics, I will prescribe Pyridium to help relieve symptoms at this time    We will adjust treatment recommendation after we obtain the result of the urine culture if necessary    Please follow up immediately if you have fever, nausea or vomiting or with any questions or concerns

## 2023-06-05 NOTE — PROGRESS NOTES
Subjective   Patient ID: Rosibel Staton is a 38 y.o. female who presents for UTI symptoms.    HPI  Presents to office with concern for UTI that began a week ago.  Endorses dysuria, discomfort while voiding, abdominal pain, odor, cloudy urine and burning.  Denies hematuria and itching.  She having marriage problems and is requesting STD testing at this time.    On a better note, she tells me that she is going to start a job on [employer] at [email or web address] working in the [employer].    Review of Systems  All pertinent positive symptoms are included in the history of present illness.    All other systems have been reviewed and are negative and noncontributory to this patient's current ailments.    Current Outpatient Medications   Medication Instructions    acetaminophen (TylenoL) 325 mg tablet oral    ARIPiprazole (ABILIFY) 15 mg, oral, Daily    atorvastatin (Lipitor) 40 mg tablet 1 tablet, oral, Nightly    clonazePAM (KLONOPIN) 1 mg, oral, 3 times daily PRN    cyclobenzaprine (FLEXERIL) 5 mg, oral, 3 times daily    dicyclomine (BENTYL) 10 mg, oral, 4 times daily PRN    FLUoxetine (PROzac) 40 mg capsule fluoxetine 40 mg capsule   TAKE ONE CAPSULE BY MOUTH EVERY DAY    hydrOXYzine pamoate (VISTARIL) 50 mg, oral, Every 6 hours PRN    ibuprofen 200 mg tablet oral    LORazepam (Ativan) 1 mg tablet lorazepam 1 mg tablet   TAKE ONE TABLET BY MOUTH TWO TIMES A DAY    methylPREDNISolone (Medrol Dospak) 4 mg tablets Take as directed on package.    nitrofurantoin, macrocrystal-monohydrate, (Macrobid) 100 mg capsule 100 mg, oral, 2 times daily with meals    norethindrone (AYGESTIN) 5 mg, oral    ondansetron (ZOFRAN) 8 mg, oral, Every 12 hours PRN    phenazopyridine (PYRIDIUM) 200 mg, oral, 3 times daily PRN    propranolol (Inderal) 10 mg tablet oral    SUMAtriptan (IMITREX) 50 mg, oral, Once as needed, May repeat after 2 hours.     Allergies   Allergen Reactions    Azithromycin Unknown    Bacitracin Zinc-Polymyxin B Unknown    Ciprofloxacin  "Unknown    Citalopram Unknown    Duloxetine Unknown    Gabapentin Unknown    Gadolinium-Containing Contrast Media Unknown    Lithium Unknown    Nickel Unknown    Nitrofurantoin Monohyd/M-Cryst Unknown    Prednisone Unknown    Quetiapine Unknown    Sertraline Unknown    Sulfamethoxazole-Trimethoprim Unknown    Iodinated Contrast Media Unknown     Immunization History   Administered Date(s) Administered    Tdap 01/15/2014, 10/19/2015     Past Surgical History:   Procedure Laterality Date    OTHER SURGICAL HISTORY  10/24/2016    Drainage Of Pelvic Abscess    OTHER SURGICAL HISTORY  05/25/2022    Oophorectomy bilateral    OTHER SURGICAL HISTORY  07/16/2019    Laparoscopic hysterectomy    OTHER SURGICAL HISTORY  01/06/2017    Abdominal Surgery     Family History   Problem Relation Name Age of Onset    Breast cancer Mother      Stroke Father      Diabetes Father      Breast cancer Maternal Grandmother       Social History     Tobacco Use    Smoking status: Every Day     Types: Cigarettes    Smokeless tobacco: Never       Objective   Visit Vitals  /70   Pulse 98   Ht 1.676 m (5' 6\")   Wt 64 kg (141 lb)   BMI 22.76 kg/m²   Smoking Status Every Day   BSA 1.73 m²       Physical Exam  CONSTITUTIONAL - well nourished, well developed, looks like stated age, in no acute distress, not ill-appearing, and not tired appearing  SKIN - normal skin color and pigmentation, normal skin turgor without rash, lesions, or nodules visualized  HEAD - no trauma, normocephalic  EYES - normal external exam  NECK - supple without rigidity, no neck mass was observed, no thyromegaly or thyroid nodules  CHEST - clear to auscultation, no wheezing, no crackles and no rales, good effort  CARDIAC - regular rate and regular rhythm, no skipped beats, no murmur  ABDOMEN - no organomegaly, soft, nontender, nondistended, normal bowel sounds, no guarding/rebound/rigidity  EXTREMITIES - no edema, no deformities  NEUROLOGICAL - normal gait, normal balance, " normal motor, no ataxia  PSYCHIATRIC - alert, pleasant and cordial, age-appropriate  IMMUNOLOGIC - no cervical lymphadenopathy     Assessment/Plan   Problem List Items Addressed This Visit       UTI symptoms - Primary     UA only significant for trace leukocytes so it was sent for culture and sensitivity if necessary  Due to allergies to multiple antibiotics, I will prescribe Pyridium to help relieve symptoms at this time    We will adjust treatment recommendation after we obtain the result of the urine culture if necessary    Please follow up immediately if you have fever, nausea or vomiting or with any questions or concerns         Relevant Medications    phenazopyridine (Pyridium) 200 mg tablet    Other Relevant Orders    POCT UA (nonautomated) manually resulted (Completed)    Urine Culture    Possible exposure to STD     I am sorry for the current marital difficulties you are going through  We will notify of test results once available         Relevant Orders    C. Trachomatis / N. Gonorrhoeae, Amplified Detection    HIV 1/2 Antigen/Antibody Screen with Reflex to Confirmation    RPR

## 2023-06-05 NOTE — ASSESSMENT & PLAN NOTE
I am sorry for the current marital difficulties you are going through  We will notify of test results once available

## 2023-06-06 LAB
CHLAMYDIA TRACH., AMPLIFIED: NEGATIVE
HIV 1/ 2 AG/AB SCREEN: NONREACTIVE
N. GONORRHEA, AMPLIFIED: NEGATIVE

## 2023-06-07 LAB
RPR MONITORING: NONREACTIVE
URINE CULTURE: NORMAL

## 2023-06-07 NOTE — RESULT ENCOUNTER NOTE
Gonorrhea, chlamydia negative    HIV negative    Still waiting for urine culture and syphilis results

## 2023-06-09 NOTE — RESULT ENCOUNTER NOTE
Urine culture is clearly negative without any bacteria so the use of antibiotic therapy is not necessary    Final STD test, syphilis, is negative as well    Now, if you continue to have symptoms, we should consider getting urologist to consult for you

## 2023-06-12 ENCOUNTER — TELEPHONE (OUTPATIENT)
Dept: PRIMARY CARE | Facility: CLINIC | Age: 38
End: 2023-06-12
Payer: MEDICARE

## 2023-06-12 NOTE — TELEPHONE ENCOUNTER
----- Message from Edu Magdaleno DO sent at 6/9/2023  2:13 PM EDT -----  Urine culture is clearly negative without any bacteria so the use of antibiotic therapy is not necessary    Final STD test, syphilis, is negative as well    Now, if you continue to have symptoms, we should consider getting urologist to consult for you

## 2023-06-16 ENCOUNTER — TELEPHONE (OUTPATIENT)
Dept: PRIMARY CARE | Facility: CLINIC | Age: 38
End: 2023-06-16
Payer: MEDICARE

## 2023-06-16 DIAGNOSIS — R11.0 NAUSEA: ICD-10-CM

## 2023-06-16 RX ORDER — ONDANSETRON 4 MG/1
8 TABLET, FILM COATED ORAL EVERY 12 HOURS PRN
Qty: 30 TABLET | Refills: 0 | Status: SHIPPED | OUTPATIENT
Start: 2023-06-16 | End: 2023-07-06 | Stop reason: SDUPTHER

## 2023-06-16 NOTE — TELEPHONE ENCOUNTER
Pt called in stating that her nausea alongside her anxiety has been really bad. She is wondering if she is able to take the zofran 4mg more than every 12 hours and if she is able to get a refill on this? Please advise and I will refill.     Also states she is using the bathroom frequently, has been experiencing diarrhea and weight loss.

## 2023-06-16 NOTE — TELEPHONE ENCOUNTER
----- Message from Charli Cassidy DO sent at 6/16/2023  9:39 AM EDT -----  Yes she can take it up to 3-4 times a day but I would recommend she only stays around 3 times a day    Also, please send a refill for 8 mg and I will approve    Thank you  ----- Message -----  From: Radha Valenzuela MA  Sent: 6/16/2023   8:53 AM EDT  To: Charli Cassidy DO

## 2023-07-06 DIAGNOSIS — R11.0 NAUSEA: ICD-10-CM

## 2023-07-06 RX ORDER — ONDANSETRON 4 MG/1
8 TABLET, FILM COATED ORAL EVERY 12 HOURS PRN
Qty: 60 TABLET | Refills: 0 | Status: SHIPPED | OUTPATIENT
Start: 2023-07-06 | End: 2023-12-12 | Stop reason: SDUPTHER

## 2023-07-11 ENCOUNTER — TELEPHONE (OUTPATIENT)
Dept: PRIMARY CARE | Facility: CLINIC | Age: 38
End: 2023-07-11
Payer: MEDICARE

## 2023-07-12 ENCOUNTER — TELEPHONE (OUTPATIENT)
Dept: PRIMARY CARE | Facility: CLINIC | Age: 38
End: 2023-07-12
Payer: MEDICARE

## 2023-07-12 NOTE — TELEPHONE ENCOUNTER
----- Message from Edu Magdaleno DO sent at 7/11/2023  8:03 PM EDT -----  Junito Lino,  We had tested her for STDs as well as a urinary tract infection, all of which were negative.  She could be having what we call bacterial vaginosis, but the only way to know would be to do a culture.  We could certainly have her seen by Rowena on Wednesday in the office, as she will be here in the morning and she can do a female exam and take some cultures to make sure that were not missing something for her.  The other option she has is to be seen by her gynecologist.  LW  ----- Message -----  From: Radha Valenzuela MA  Sent: 7/11/2023  12:37 PM EDT  To: Edu Magdaleno DO

## 2023-07-19 ENCOUNTER — OFFICE VISIT (OUTPATIENT)
Dept: PRIMARY CARE | Facility: CLINIC | Age: 38
End: 2023-07-19
Payer: MEDICARE

## 2023-07-19 VITALS
WEIGHT: 136.6 LBS | HEIGHT: 66 IN | DIASTOLIC BLOOD PRESSURE: 68 MMHG | HEART RATE: 72 BPM | SYSTOLIC BLOOD PRESSURE: 110 MMHG | BODY MASS INDEX: 21.95 KG/M2

## 2023-07-19 DIAGNOSIS — R63.4 WEIGHT LOSS: ICD-10-CM

## 2023-07-19 DIAGNOSIS — F33.1 MODERATE EPISODE OF RECURRENT MAJOR DEPRESSIVE DISORDER (MULTI): Primary | ICD-10-CM

## 2023-07-19 PROCEDURE — 99213 OFFICE O/P EST LOW 20 MIN: CPT | Performed by: STUDENT IN AN ORGANIZED HEALTH CARE EDUCATION/TRAINING PROGRAM

## 2023-07-19 NOTE — PROGRESS NOTES
"Subjective   Patient ID: Rosibel Staton is a 38 y.o. female who presents for Weight Loss.    HPI   Patient is presenting to the office today due to signs/symptoms of weight loss as well as increased signs/symptoms involving her mood    Unfortunately her  left her and is now asking for divorce as well as her children    Stated that he was not faithful and this has been going on for quite some time    They have now  and her psychiatrist is now worried that she is losing weight too quickly    Previously in my office she was 150 pounds and today she is approximate 137 pounds    Wishes to discuss how she can keep this weight on and wish to discuss everything at this time    Review of Systems  All pertinent positive symptoms are included in the history of present illness.    All other systems have been reviewed and are negative and noncontributory to this patient's current ailments.  Objective   /68 (BP Location: Left arm, Patient Position: Sitting, BP Cuff Size: Adult)   Pulse 72   Ht 1.676 m (5' 6\")   Wt 62 kg (136 lb 9.6 oz)   BMI 22.05 kg/m²     Physical Exam  CONSTITUTIONAL - well nourished, well developed, looks like stated age, in no acute distress, not ill-appearing, and not tired appearing  SKIN - normal skin color and pigmentation, normal skin turgor without rash, lesions, or nodules visualized  HEAD - no trauma, normocephalic  EYES - normal external exam  CHEST -no distressed breathing, good effort  EXTREMITIES - no edema, no deformities  NEUROLOGICAL - normal balance, normal motor, no ataxia  PSYCHIATRIC - alert, pleasant and cordial, age-appropriate  Assessment/Plan   I am so sorry to hear everything that you are going through    I recommend you increase your protein intake as well as getting drinks such as Ensure as well as those protein shakes as you showed me today on your telephone    I recommend also to supplement some fat in your diet via milkshakes a few times a week    I " would like close follow-up and please come back within the next 4 weeks so we can repeat lab work as well as perform your yearly physical examination

## 2023-07-20 LAB
CHLAMYDIA TRACH., AMPLIFIED: NEGATIVE
CLUE CELLS: PRESENT
N. GONORRHEA, AMPLIFIED: NEGATIVE
NUGENT SCORE: 9
YEAST: ABNORMAL

## 2023-07-21 LAB
HEPATITIS B VIRUS SURFACE AG PRESENCE IN SERUM: NONREACTIVE
HEPATITIS C VIRUS AB PRESENCE IN SERUM: NONREACTIVE
HIV 1/ 2 AG/AB SCREEN: NONREACTIVE
SYPHILIS TOTAL AB: NONREACTIVE

## 2023-07-24 ENCOUNTER — APPOINTMENT (OUTPATIENT)
Dept: PRIMARY CARE | Facility: CLINIC | Age: 38
End: 2023-07-24
Payer: MEDICARE

## 2023-08-30 ENCOUNTER — OFFICE VISIT (OUTPATIENT)
Dept: PRIMARY CARE | Facility: CLINIC | Age: 38
End: 2023-08-30
Payer: MEDICARE

## 2023-08-30 ENCOUNTER — LAB (OUTPATIENT)
Dept: LAB | Facility: LAB | Age: 38
End: 2023-08-30
Payer: MEDICARE

## 2023-08-30 VITALS
WEIGHT: 135 LBS | BODY MASS INDEX: 21.69 KG/M2 | HEART RATE: 56 BPM | SYSTOLIC BLOOD PRESSURE: 112 MMHG | HEIGHT: 66 IN | DIASTOLIC BLOOD PRESSURE: 70 MMHG

## 2023-08-30 DIAGNOSIS — Z00.00 ENCOUNTER FOR MEDICARE ANNUAL WELLNESS EXAM: Primary | ICD-10-CM

## 2023-08-30 DIAGNOSIS — F33.1 MODERATE EPISODE OF RECURRENT MAJOR DEPRESSIVE DISORDER (MULTI): ICD-10-CM

## 2023-08-30 DIAGNOSIS — R63.4 WEIGHT LOSS: ICD-10-CM

## 2023-08-30 DIAGNOSIS — G43.709 CHRONIC MIGRAINE WITHOUT AURA WITHOUT STATUS MIGRAINOSUS, NOT INTRACTABLE: ICD-10-CM

## 2023-08-30 DIAGNOSIS — F41.1 GENERALIZED ANXIETY DISORDER: ICD-10-CM

## 2023-08-30 DIAGNOSIS — E78.2 MIXED HYPERLIPIDEMIA: ICD-10-CM

## 2023-08-30 DIAGNOSIS — G43.809 OTHER MIGRAINE WITHOUT STATUS MIGRAINOSUS, NOT INTRACTABLE: ICD-10-CM

## 2023-08-30 DIAGNOSIS — G43.E09 CHRONIC MIGRAINE WITH AURA: ICD-10-CM

## 2023-08-30 DIAGNOSIS — M25.512 ACUTE PAIN OF LEFT SHOULDER: ICD-10-CM

## 2023-08-30 DIAGNOSIS — Z00.00 ENCOUNTER FOR MEDICARE ANNUAL WELLNESS EXAM: ICD-10-CM

## 2023-08-30 LAB
ALANINE AMINOTRANSFERASE (SGPT) (U/L) IN SER/PLAS: 20 U/L (ref 7–45)
ALBUMIN (G/DL) IN SER/PLAS: 4.5 G/DL (ref 3.4–5)
ALKALINE PHOSPHATASE (U/L) IN SER/PLAS: 76 U/L (ref 33–110)
ANION GAP IN SER/PLAS: 13 MMOL/L (ref 10–20)
ASPARTATE AMINOTRANSFERASE (SGOT) (U/L) IN SER/PLAS: 17 U/L (ref 9–39)
BASOPHILS (10*3/UL) IN BLOOD BY AUTOMATED COUNT: 0.07 X10E9/L (ref 0–0.1)
BASOPHILS/100 LEUKOCYTES IN BLOOD BY AUTOMATED COUNT: 0.5 % (ref 0–2)
BILIRUBIN TOTAL (MG/DL) IN SER/PLAS: 0.5 MG/DL (ref 0–1.2)
CALCIUM (MG/DL) IN SER/PLAS: 9.5 MG/DL (ref 8.6–10.3)
CARBON DIOXIDE, TOTAL (MMOL/L) IN SER/PLAS: 27 MMOL/L (ref 21–32)
CHLORIDE (MMOL/L) IN SER/PLAS: 104 MMOL/L (ref 98–107)
CHOLESTEROL (MG/DL) IN SER/PLAS: 162 MG/DL (ref 0–199)
CHOLESTEROL IN HDL (MG/DL) IN SER/PLAS: 60.4 MG/DL
CHOLESTEROL/HDL RATIO: 2.7
CREATININE (MG/DL) IN SER/PLAS: 0.63 MG/DL (ref 0.5–1.05)
EOSINOPHILS (10*3/UL) IN BLOOD BY AUTOMATED COUNT: 0.12 X10E9/L (ref 0–0.7)
EOSINOPHILS/100 LEUKOCYTES IN BLOOD BY AUTOMATED COUNT: 0.9 % (ref 0–6)
ERYTHROCYTE DISTRIBUTION WIDTH (RATIO) BY AUTOMATED COUNT: 11.9 % (ref 11.5–14.5)
ERYTHROCYTE MEAN CORPUSCULAR HEMOGLOBIN CONCENTRATION (G/DL) BY AUTOMATED: 32.6 G/DL (ref 32–36)
ERYTHROCYTE MEAN CORPUSCULAR VOLUME (FL) BY AUTOMATED COUNT: 89 FL (ref 80–100)
ERYTHROCYTES (10*6/UL) IN BLOOD BY AUTOMATED COUNT: 5.23 X10E12/L (ref 4–5.2)
GFR FEMALE: >90 ML/MIN/1.73M2
GLUCOSE (MG/DL) IN SER/PLAS: 86 MG/DL (ref 74–99)
HEMATOCRIT (%) IN BLOOD BY AUTOMATED COUNT: 46.6 % (ref 36–46)
HEMOGLOBIN (G/DL) IN BLOOD: 15.2 G/DL (ref 12–16)
IMMATURE GRANULOCYTES/100 LEUKOCYTES IN BLOOD BY AUTOMATED COUNT: 0.4 % (ref 0–0.9)
LDL: 88 MG/DL (ref 0–99)
LEUKOCYTES (10*3/UL) IN BLOOD BY AUTOMATED COUNT: 13.1 X10E9/L (ref 4.4–11.3)
LYMPHOCYTES (10*3/UL) IN BLOOD BY AUTOMATED COUNT: 2.82 X10E9/L (ref 1.2–4.8)
LYMPHOCYTES/100 LEUKOCYTES IN BLOOD BY AUTOMATED COUNT: 21.5 % (ref 13–44)
MONOCYTES (10*3/UL) IN BLOOD BY AUTOMATED COUNT: 0.87 X10E9/L (ref 0.1–1)
MONOCYTES/100 LEUKOCYTES IN BLOOD BY AUTOMATED COUNT: 6.6 % (ref 2–10)
NEUTROPHILS (10*3/UL) IN BLOOD BY AUTOMATED COUNT: 9.16 X10E9/L (ref 1.2–7.7)
NEUTROPHILS/100 LEUKOCYTES IN BLOOD BY AUTOMATED COUNT: 70.1 % (ref 40–80)
PLATELETS (10*3/UL) IN BLOOD AUTOMATED COUNT: 264 X10E9/L (ref 150–450)
POTASSIUM (MMOL/L) IN SER/PLAS: 4.5 MMOL/L (ref 3.5–5.3)
PROTEIN TOTAL: 7.2 G/DL (ref 6.4–8.2)
SODIUM (MMOL/L) IN SER/PLAS: 139 MMOL/L (ref 136–145)
THYROTROPIN (MIU/L) IN SER/PLAS BY DETECTION LIMIT <= 0.05 MIU/L: 0.45 MIU/L (ref 0.44–3.98)
TRIGLYCERIDE (MG/DL) IN SER/PLAS: 66 MG/DL (ref 0–149)
UREA NITROGEN (MG/DL) IN SER/PLAS: 8 MG/DL (ref 6–23)
VLDL: 13 MG/DL (ref 0–40)

## 2023-08-30 PROCEDURE — G0439 PPPS, SUBSEQ VISIT: HCPCS | Performed by: STUDENT IN AN ORGANIZED HEALTH CARE EDUCATION/TRAINING PROGRAM

## 2023-08-30 PROCEDURE — 36415 COLL VENOUS BLD VENIPUNCTURE: CPT

## 2023-08-30 PROCEDURE — 80061 LIPID PANEL: CPT

## 2023-08-30 PROCEDURE — 99214 OFFICE O/P EST MOD 30 MIN: CPT | Performed by: STUDENT IN AN ORGANIZED HEALTH CARE EDUCATION/TRAINING PROGRAM

## 2023-08-30 PROCEDURE — 83036 HEMOGLOBIN GLYCOSYLATED A1C: CPT

## 2023-08-30 PROCEDURE — 85025 COMPLETE CBC W/AUTO DIFF WBC: CPT

## 2023-08-30 PROCEDURE — 84443 ASSAY THYROID STIM HORMONE: CPT

## 2023-08-30 PROCEDURE — 80053 COMPREHEN METABOLIC PANEL: CPT

## 2023-08-30 RX ORDER — FLUOXETINE HYDROCHLORIDE 40 MG/1
1 CAPSULE ORAL DAILY
COMMUNITY
End: 2023-09-26 | Stop reason: ALTCHOICE

## 2023-08-30 RX ORDER — MUPIROCIN 20 MG/G
OINTMENT TOPICAL
COMMUNITY
End: 2023-10-18 | Stop reason: ALTCHOICE

## 2023-08-30 RX ORDER — FLUTICASONE PROPIONATE 50 MCG
2 SPRAY, SUSPENSION (ML) NASAL DAILY
COMMUNITY
End: 2023-09-26 | Stop reason: ALTCHOICE

## 2023-08-30 RX ORDER — KETOCONAZOLE 20 MG/ML
SHAMPOO, SUSPENSION TOPICAL
COMMUNITY
Start: 2016-06-01 | End: 2023-10-18 | Stop reason: ALTCHOICE

## 2023-08-30 RX ORDER — CYCLOBENZAPRINE HCL 5 MG
5 TABLET ORAL 3 TIMES DAILY
Qty: 30 TABLET | Refills: 0 | Status: SHIPPED | OUTPATIENT
Start: 2023-08-30 | End: 2023-10-13 | Stop reason: SDUPTHER

## 2023-08-30 RX ORDER — SUMATRIPTAN 50 MG/1
50 TABLET, FILM COATED ORAL ONCE AS NEEDED
Qty: 30 TABLET | Refills: 0 | Status: SHIPPED | OUTPATIENT
Start: 2023-08-30 | End: 2024-04-09 | Stop reason: WASHOUT

## 2023-08-30 RX ORDER — SUMATRIPTAN 50 MG/1
50 TABLET, FILM COATED ORAL ONCE AS NEEDED
Qty: 30 TABLET | Refills: 0 | Status: SHIPPED | OUTPATIENT
Start: 2023-08-30 | End: 2023-08-30 | Stop reason: SDUPTHER

## 2023-08-30 ASSESSMENT — PATIENT HEALTH QUESTIONNAIRE - PHQ9
10. IF YOU CHECKED OFF ANY PROBLEMS, HOW DIFFICULT HAVE THESE PROBLEMS MADE IT FOR YOU TO DO YOUR WORK, TAKE CARE OF THINGS AT HOME, OR GET ALONG WITH OTHER PEOPLE: SOMEWHAT DIFFICULT
SUM OF ALL RESPONSES TO PHQ9 QUESTIONS 1 AND 2: 6
7. TROUBLE CONCENTRATING ON THINGS, SUCH AS READING THE NEWSPAPER OR WATCHING TELEVISION: NOT AT ALL
1. LITTLE INTEREST OR PLEASURE IN DOING THINGS: NEARLY EVERY DAY
5. POOR APPETITE OR OVEREATING: NEARLY EVERY DAY
10. IF YOU CHECKED OFF ANY PROBLEMS, HOW DIFFICULT HAVE THESE PROBLEMS MADE IT FOR YOU TO DO YOUR WORK, TAKE CARE OF THINGS AT HOME, OR GET ALONG WITH OTHER PEOPLE: SOMEWHAT DIFFICULT
9. THOUGHTS THAT YOU WOULD BE BETTER OFF DEAD, OR OF HURTING YOURSELF: NOT AT ALL
3. TROUBLE FALLING OR STAYING ASLEEP OR SLEEPING TOO MUCH: SEVERAL DAYS
8. MOVING OR SPEAKING SO SLOWLY THAT OTHER PEOPLE COULD HAVE NOTICED. OR THE OPPOSITE, BEING SO FIGETY OR RESTLESS THAT YOU HAVE BEEN MOVING AROUND A LOT MORE THAN USUAL: NOT AT ALL
6. FEELING BAD ABOUT YOURSELF - OR THAT YOU ARE A FAILURE OR HAVE LET YOURSELF OR YOUR FAMILY DOWN: NOT AT ALL
4. FEELING TIRED OR HAVING LITTLE ENERGY: SEVERAL DAYS
2. FEELING DOWN, DEPRESSED OR HOPELESS: NEARLY EVERY DAY

## 2023-08-30 ASSESSMENT — ENCOUNTER SYMPTOMS
LOSS OF SENSATION IN FEET: 0
DEPRESSION: 1
OCCASIONAL FEELINGS OF UNSTEADINESS: 0

## 2023-08-30 ASSESSMENT — ACTIVITIES OF DAILY LIVING (ADL)
MANAGING_FINANCES: INDEPENDENT
TAKING_MEDICATION: INDEPENDENT
DOING_HOUSEWORK: INDEPENDENT
BATHING: INDEPENDENT
GROCERY_SHOPPING: INDEPENDENT
DRESSING: INDEPENDENT

## 2023-08-30 NOTE — PROGRESS NOTES
Subjective   Reason for Visit: Rosibel Staton is an 38 y.o. female here for a Medicare Wellness visit.          Reviewed all medications by prescribing practitioner or clinical pharmacist (such as prescriptions, OTCs, herbal therapies and supplements) and documented in the medical record.    HPI  Medicare annual health maintenance  Overall patient is doing well.  She denies any chest pain, shortness of breath with exertion.  Also denies any GI or urinary symptoms.  No changes in her vision or hearing.   Immunization: Tdap in 2015  Colon Cancer Screening: Had a colonoscopy in 2020 with 10 years clearance  OB/GYN: Pap smear and mammogram are up to date.  She follows with her OB/GYN doctor  Diet: Regular  Exercise: Tries to exercise and being active in her work  Tobacco: 16 to 19 cigarettes/day, trying to cut down  EtOH: Rarely Socially    Fasting to do her labs today    2. Weight loss  Had been losing weight (13 pounds) from 3/17/23 to 7/19/23.  Her weight looks pretty stable in the last month.  She admits her appetite is getting better  Has had stressors at home, involving  moved out and she is going through a divorce.  She states he was not faithful for some time.  Was recommended to increase protein and fat consumption through supplements like Ensure or milkshakes.    3. Hyperlipidemia  Last panel 4/6/2021  Cholesterol 186, HDL 71, LDL 99, triglycerides 79    4. Migraine with aura  Has been taking sumatriptan 50 mg as needed    5. Depression with anxiety  Has been taking aripiprazole 15 mg once daily   She has been seeing a psychiatrist every month and goes to Cognitive therapy 3- 4 times a month.  She states that although she is going through a divorce , her stressors are reduced since her  moved out.    6. Left shoulder pain  Patient states that she injured her left shoulder 2 weeks ago while trying to lift an heavy object.    She has pain with putting her left arm above the shoulder, and in the  "back of her body.    Symptoms have gradually improved during the first week after injury, persistent during the last week  She uses Tylenol and ibuprofen with little relief    Allergies   Allergen Reactions    Azithromycin Unknown    Bacitracin Zinc-Polymyxin B Unknown    Ciprofloxacin Unknown    Citalopram Unknown    Duloxetine Unknown    Gabapentin Unknown    Gadolinium-Containing Contrast Media Unknown    Iodinated Contrast Media Unknown    Lithium Unknown    Nickel Unknown    Nitrofurantoin Monohyd/M-Cryst Unknown    Prednisone Unknown    Quetiapine Unknown    Sertraline Unknown    Sulfamethoxazole-Trimethoprim Unknown       Immunization History   Administered Date(s) Administered    Tdap vaccine, age 10 years and older (BOOSTRIX) 01/15/2014, 10/19/2015       Patient Self Assessment of Health Status  Patient Self Assessment: Good    Nutrition and Exercise  Current Diet: Well Balanced Diet  Adequate Fluid Intake: No  Caffeine: Yes  Exercise Frequency: Regularly    Functional Ability/Level of Safety  Cognitive Impairment Observed: No cognitive impairment observed    Home Safety Risk Factors: None    Patient Care Team:  Ante T Pletikosic, DO as PCP - General  Ante T Pletikosic, DO as PCP - [facility] Attributed Provider     Review of Systems  All pertinent positive symptoms are included in the history of present illness.    All other systems have been reviewed and are negative and noncontributory to this patient's current ailments.    Objective   Vitals:  /70 (BP Location: Left arm, Patient Position: Sitting, BP Cuff Size: Adult)   Pulse 56   Ht 1.676 m (5' 6\")   Wt 61.2 kg (135 lb)   BMI 21.79 kg/m²       Physical Exam  CONSTITUTIONAL - well nourished, well developed, looks like stated age, in no acute distress, not ill-appearing, and not tired appearing  SKIN - normal skin color and pigmentation, normal skin turgor without rash, lesions, or nodules visualized  HEAD - no trauma, normocephalic  EYES - " pupils are equal and reactive to light, extraocular muscles are intact, and normal external exam  ENT - no injection, no signs of infection, uvula midline, normal tongue movement and throat normal, no exudate, nasal passage without discharge and patent  NECK - supple without rigidity, no neck mass was observed, no thyromegaly or thyroid nodules  CHEST - clear to auscultation, no wheezing, no crackles and no rales, good effort  CARDIAC - regular rate and regular rhythm, no skipped beats, no murmur  ABDOMEN - no organomegaly, soft, nontender, nondistended, normal bowel sounds, no guarding/rebound/rigidity,   EXTREMITIES - no edema, no deformities  NEUROLOGICAL - normal gait, normal balance, normal motor, no ataxia, DTRs equal and symmetrical; alert, oriented and no focal signs  PSYCHIATRIC - alert, pleasant and cordial, age-appropriate  IMMUNOLOGIC - no cervical lymphadenopathy    Assessment/Plan   Problem List Items Addressed This Visit       Generalized anxiety disorder    Relevant Orders    TSH with reflex to Free T4 if abnormal    Lipid Panel    Comprehensive Metabolic Panel    CBC and Auto Differential    Hemoglobin A1C    Hyperlipidemia    Relevant Orders    TSH with reflex to Free T4 if abnormal    Lipid Panel    Comprehensive Metabolic Panel    CBC and Auto Differential    Hemoglobin A1C    Moderate episode of recurrent major depressive disorder (CMS/HCC)    Relevant Orders    TSH with reflex to Free T4 if abnormal    Lipid Panel    Comprehensive Metabolic Panel    CBC and Auto Differential    Hemoglobin A1C    Weight loss    Relevant Orders    TSH with reflex to Free T4 if abnormal    Lipid Panel    Comprehensive Metabolic Panel    CBC and Auto Differential    Hemoglobin A1C    Chronic migraine with aura    Relevant Orders    TSH with reflex to Free T4 if abnormal    Lipid Panel    Comprehensive Metabolic Panel    CBC and Auto Differential    Hemoglobin A1C     Other Visit Diagnoses       Encounter for  Medicare annual wellness exam    -  Primary    Relevant Orders    TSH with reflex to Free T4 if abnormal    Lipid Panel    Comprehensive Metabolic Panel    CBC and Auto Differential    Hemoglobin A1C    Acute pain of left shoulder        Relevant Medications    cyclobenzaprine (Flexeril) 5 mg tablet    Other Relevant Orders    XR shoulder left 2+ views    TSH with reflex to Free T4 if abnormal    Lipid Panel    Comprehensive Metabolic Panel    CBC and Auto Differential    Hemoglobin A1C    Chronic migraine without aura without status migrainosus, not intractable        Relevant Medications    cyclobenzaprine (Flexeril) 5 mg tablet    Other Relevant Orders    TSH with reflex to Free T4 if abnormal    Lipid Panel    Comprehensive Metabolic Panel    CBC and Auto Differential    Hemoglobin A1C    Other migraine without status migrainosus, not intractable        Relevant Medications    SUMAtriptan (Imitrex) 50 mg tablet          1.Medicare annual health maintenance  Physical exam was done today   Patient CBC, CMP, TSH, A1c, lipid panel, and UA were given to you today  You will be informed with the results and we will make recommendation accordingly  Colon Cancer Screening is due in 2030  Follow-up with your OB/GYN doctor Pap smear and mammograms  Please conduct a healthy lifestyle, exercise regularly  Please try to cut down on your cigarette smoking  If you need help and are considering quitting, please let us help you with the process    2. Weight loss  Weight has been stable in the last month  Appetite is getting back and you having less stressors at home  We recommend to increase protein and fat consumption through supplements like Ensure or milkshakes.  Follow-up with our office if you continue to lose weight     3. Hyperlipidemia  Continue Lipitor 40 mg daily  We will check your lipid panel today  We will make recommendation accordingly to the results    4. Migraine with aura  Take sumatriptan 50 mg as needed  We  printed a good Rx  for sumatriptan  Please check with your pharmacy if you can afford the medication  Med refill was sent to the pharmacy     5. Depression with anxiety  Happy to hear that you are doing a little better  Continue aripiprazole 15 mg once daily,   clonazepam 1 mg 3 times a day,   Hydroxyzine 50 mg 4 times a day,  Propanolol 10 mg as needed  Follow-up with your psychiatrist and continue your cognitive therapy     6. Left shoulder pain  Continue with ibuprofen and Tylenol as needed  We prescribed flexeril 5 mg 3 times /day  Voltaren gel recommended is OTC  Left shoulder Xray was given to you today  You will be informed with the results and will make recommendation accordingly  If your symptoms does not resolve in 2 weeks next step is referral for physical therapy    Thank you for letting us be a part of your care team.  Please call the office if you have further questions or concerns regarding your care

## 2023-08-31 ENCOUNTER — TELEPHONE (OUTPATIENT)
Dept: PRIMARY CARE | Facility: CLINIC | Age: 38
End: 2023-08-31
Payer: MEDICARE

## 2023-08-31 DIAGNOSIS — D72.829 LEUKOCYTOSIS, UNSPECIFIED TYPE: Primary | ICD-10-CM

## 2023-08-31 LAB
ESTIMATED AVERAGE GLUCOSE FOR HBA1C: 126 MG/DL
HEMOGLOBIN A1C/HEMOGLOBIN TOTAL IN BLOOD: 6 %

## 2023-08-31 NOTE — TELEPHONE ENCOUNTER
Pt made aware of results via [employer] portal message.       Complete blood cell count shows a slightly elevated white blood cell count at 13.1 with a slightly increased red blood cell count 5.23     Neutrophils are also slightly elevated 9.16     Due to this I would like her to repeat her blood cell count within the next 1-2 weeks to make sure that this is stable     Cholesterol looks good at 162, HDL 60, LDL 88, triglycerides 66     Sugar, kidneys, liver, electrolytes are all within normal limits     Thyroid within normal limits     We are just waiting for the hemoglobin A1c at this time   
Yes

## 2023-08-31 NOTE — RESULT ENCOUNTER NOTE
Hemoglobin A1c now shows prediabetes at 6.0%    I recommend diet and exercise and we will continue monitoring every 6 to 12 months

## 2023-08-31 NOTE — TELEPHONE ENCOUNTER
Pt made aware of results via [employer] portal message.       Hemoglobin A1c now shows prediabetes at 6.0%     I recommend diet and exercise and we will continue monitoring every 6 to 12 months

## 2023-08-31 NOTE — RESULT ENCOUNTER NOTE
Complete blood cell count shows a slightly elevated white blood cell count at 13.1 with a slightly increased red blood cell count 5.23    Neutrophils are also slightly elevated 9.16    Due to this I would like her to repeat her blood cell count within the next 1-2 weeks to make sure that this is stable    Cholesterol looks good at 162, HDL 60, LDL 88, triglycerides 66    Sugar, kidneys, liver, electrolytes are all within normal limits    Thyroid within normal limits    We are just waiting for the hemoglobin A1c at this time

## 2023-09-01 ENCOUNTER — TELEPHONE (OUTPATIENT)
Dept: PRIMARY CARE | Facility: CLINIC | Age: 38
End: 2023-09-01
Payer: COMMERCIAL

## 2023-09-01 NOTE — TELEPHONE ENCOUNTER
Spoke w/pt, pt aware of results    X-ray of left shoulder is unremarkable     There are no significant degenerative changes     If this continues to be an issue the next step would be physical therapy

## 2023-09-01 NOTE — TELEPHONE ENCOUNTER
"Pt is wondering if she should be concerned about the \"Calcified left hilar lymph nodes  likely representing sequela of chronic granulomatous disease.\" Noted from her xrays done 8/30/23.  "

## 2023-09-01 NOTE — RESULT ENCOUNTER NOTE
X-ray of left shoulder is unremarkable    There are no significant degenerative changes    If this continues to be an issue the next step would be physical therapy

## 2023-09-05 LAB — FUNGAL SCREEN, YEAST: NORMAL

## 2023-09-20 ENCOUNTER — TELEPHONE (OUTPATIENT)
Dept: PRIMARY CARE | Facility: CLINIC | Age: 38
End: 2023-09-20
Payer: COMMERCIAL

## 2023-09-20 NOTE — TELEPHONE ENCOUNTER
Pt is asking if she is able to just do an ultrasound of the breasts rather than mammogram.     She states she keeps getting sick the night before her scheduled mammogram and has not been able to complete.

## 2023-09-26 ENCOUNTER — TELEPHONE (OUTPATIENT)
Dept: PRIMARY CARE | Facility: CLINIC | Age: 38
End: 2023-09-26

## 2023-09-26 ENCOUNTER — TELEMEDICINE (OUTPATIENT)
Dept: PRIMARY CARE | Facility: CLINIC | Age: 38
End: 2023-09-26
Payer: COMMERCIAL

## 2023-09-26 DIAGNOSIS — S93.402A SPRAIN OF LEFT ANKLE, UNSPECIFIED LIGAMENT, INITIAL ENCOUNTER: Primary | ICD-10-CM
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
## 2023-09-26 DIAGNOSIS — M25.572 ACUTE LEFT ANKLE PAIN: ICD-10-CM

## 2023-09-26 PROCEDURE — 99214 OFFICE O/P EST MOD 30 MIN: CPT | Performed by: STUDENT IN AN ORGANIZED HEALTH CARE EDUCATION/TRAINING PROGRAM

## 2023-09-26 NOTE — PROGRESS NOTES
Subjective   Patient ID: Rosibel Staton is a 38 y.o. female who presents for left ankle    HPI   Patient stated that just yesterday she was walking out of her house down a flight of stairs and unfortunately twisted her ankle and fell down    She did not hear a pop or snap and is able to place weight on her left ankle but unfortunately she has severe pain that could not fully stop and unfortunately made it so she did not sleep last night    She did wrap up her ankle and is able to put weights as stated above but ultimately is unable to walk long distances    Wishes to discuss this further and now asking if this could even be a possible fracture or something else underlying    She does have a history of a sprained ankle in the past and now asking what the next step    Review of Systems  All pertinent positive symptoms are included in the history of present illness.    All other systems have been reviewed and are negative and noncontributory to this patient's current ailments.    Objective   There were no vitals taken for this visit.    Physical Exam  CONSTITUTIONAL - well nourished, well developed, looks like stated age, in no acute distress, not ill-appearing, and not tired appearing  SKIN - normal skin color and pigmentation, normal skin turgor without rash, lesions, or nodules visualized  HEAD - no trauma, normocephalic  EYES - normal external exam  CHEST -no distressed breathing, good effort  NEUROLOGICAL - normal balance, normal motor, no ataxia  PSYCHIATRIC - alert, pleasant and cordial, age-appropriate  Physical Limited due to video    Assessment/Plan   1.  Left ankle pain/sprain    I truly believe that this is probably just a severe ankle sprain but I need to be fully thorough and make sure we get a stat x-ray of your left ankle to make sure there are no acute fractures    Please get this done as early as you can and then I will ultimately have the results before the end of the day    If we do see that  there is a fracture we will get you to a foot and ankle provider and have you follow-up appropriately    Otherwise, if this x-ray is negative, I would recommend that we get you into a walking boot for the next 1-2 weeks and do home exercises    At any point if you have worsening symptoms please notify me and/or go to the nearest emergency room to be acutely evaluated

## 2023-09-26 NOTE — TELEPHONE ENCOUNTER
Spoke w/pt, pt aware of results      X-ray of the ankle shows no acute fractures, dislocations, or any swelling     Due to this I would recommend she gets a walking boot and wears this for the next 2 weeks and keep me in the loop on how she is doing

## 2023-09-26 NOTE — RESULT ENCOUNTER NOTE
X-ray of the ankle shows no acute fractures, dislocations, or any swelling    Due to this I would recommend she gets a walking boot and wears this for the next 2 weeks and keep me in the loop on how she is doing

## 2023-10-04 ENCOUNTER — HOSPITAL ENCOUNTER (OUTPATIENT)
Dept: RADIOLOGY | Facility: HOSPITAL | Age: 38
Discharge: HOME | End: 2023-10-04
Payer: MEDICARE

## 2023-10-04 DIAGNOSIS — Z12.39 ENCOUNTER FOR OTHER SCREENING FOR MALIGNANT NEOPLASM OF BREAST: ICD-10-CM

## 2023-10-04 PROCEDURE — A9575 INJ GADOTERATE MEGLUMI 0.1ML: HCPCS | Performed by: NURSE PRACTITIONER

## 2023-10-04 PROCEDURE — 2500000004 HC RX 250 GENERAL PHARMACY W/ HCPCS (ALT 636 FOR OP/ED): Performed by: NURSE PRACTITIONER

## 2023-10-04 PROCEDURE — 6100000003 BI MR BREAST BILATERAL WITH CONTRAST FAST SCREENING SELF PAY

## 2023-10-04 RX ADMIN — GADOTERATE MEGLUMINE 12 ML: 376.9 INJECTION INTRAVENOUS at 18:17

## 2023-10-11 ENCOUNTER — APPOINTMENT (OUTPATIENT)
Dept: RADIOLOGY | Facility: CLINIC | Age: 38
End: 2023-10-11

## 2023-10-13 DIAGNOSIS — G43.709 CHRONIC MIGRAINE WITHOUT AURA WITHOUT STATUS MIGRAINOSUS, NOT INTRACTABLE: ICD-10-CM

## 2023-10-13 DIAGNOSIS — M25.512 ACUTE PAIN OF LEFT SHOULDER: ICD-10-CM

## 2023-10-13 RX ORDER — CYCLOBENZAPRINE HCL 5 MG
5 TABLET ORAL 3 TIMES DAILY
Qty: 30 TABLET | Refills: 0 | Status: SHIPPED | OUTPATIENT
Start: 2023-10-13 | End: 2023-12-01 | Stop reason: SDUPTHER

## 2023-10-15 ASSESSMENT — ENCOUNTER SYMPTOMS
MUSCLE WEAKNESS: 1
TINGLING: 1
LOSS OF MOTION: 1

## 2023-10-18 ENCOUNTER — OFFICE VISIT (OUTPATIENT)
Dept: PRIMARY CARE | Facility: CLINIC | Age: 38
End: 2023-10-18
Payer: COMMERCIAL

## 2023-10-18 ENCOUNTER — LAB (OUTPATIENT)
Dept: LAB | Facility: LAB | Age: 38
End: 2023-10-18
Payer: COMMERCIAL

## 2023-10-18 VITALS
HEART RATE: 67 BPM | SYSTOLIC BLOOD PRESSURE: 120 MMHG | BODY MASS INDEX: 21.18 KG/M2 | HEIGHT: 66 IN | DIASTOLIC BLOOD PRESSURE: 70 MMHG | WEIGHT: 131.8 LBS
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
## 2023-10-18 DIAGNOSIS — M25.572 ACUTE LEFT ANKLE PAIN: ICD-10-CM

## 2023-10-18 DIAGNOSIS — S93.402A SPRAIN OF LEFT ANKLE, UNSPECIFIED LIGAMENT, INITIAL ENCOUNTER: Primary | ICD-10-CM

## 2023-10-18 DIAGNOSIS — D72.829 LEUKOCYTOSIS, UNSPECIFIED TYPE: ICD-10-CM

## 2023-10-18 LAB
BASOPHILS # BLD AUTO: 0.07 X10*3/UL (ref 0–0.1)
BASOPHILS NFR BLD AUTO: 0.6 %
EOSINOPHIL # BLD AUTO: 0.14 X10*3/UL (ref 0–0.7)
EOSINOPHIL NFR BLD AUTO: 1.2 %
ERYTHROCYTE [DISTWIDTH] IN BLOOD BY AUTOMATED COUNT: 12.9 % (ref 11.5–14.5)
HCT VFR BLD AUTO: 42.8 % (ref 36–46)
HGB BLD-MCNC: 13.8 G/DL (ref 12–16)
IMM GRANULOCYTES # BLD AUTO: 0.05 X10*3/UL (ref 0–0.7)
IMM GRANULOCYTES NFR BLD AUTO: 0.4 % (ref 0–0.9)
LYMPHOCYTES # BLD AUTO: 2.75 X10*3/UL (ref 1.2–4.8)
LYMPHOCYTES NFR BLD AUTO: 23.7 %
MCH RBC QN AUTO: 28.6 PG (ref 26–34)
MCHC RBC AUTO-ENTMCNC: 32.2 G/DL (ref 32–36)
MCV RBC AUTO: 89 FL (ref 80–100)
MONOCYTES # BLD AUTO: 0.65 X10*3/UL (ref 0.1–1)
MONOCYTES NFR BLD AUTO: 5.6 %
NEUTROPHILS # BLD AUTO: 7.93 X10*3/UL (ref 1.2–7.7)
NEUTROPHILS NFR BLD AUTO: 68.5 %
NRBC BLD-RTO: 0 /100 WBCS (ref 0–0)
PLATELET # BLD AUTO: 270 X10*3/UL (ref 150–450)
PMV BLD AUTO: 12.2 FL (ref 7.5–11.5)
RBC # BLD AUTO: 4.83 X10*6/UL (ref 4–5.2)
WBC # BLD AUTO: 11.6 X10*3/UL (ref 4.4–11.3)

## 2023-10-18 PROCEDURE — 99213 OFFICE O/P EST LOW 20 MIN: CPT | Performed by: STUDENT IN AN ORGANIZED HEALTH CARE EDUCATION/TRAINING PROGRAM

## 2023-10-18 PROCEDURE — 85025 COMPLETE CBC W/AUTO DIFF WBC: CPT

## 2023-10-18 PROCEDURE — 36415 COLL VENOUS BLD VENIPUNCTURE: CPT

## 2023-10-18 NOTE — PROGRESS NOTES
"  Subjective   Patient ID: Rosibel Staton is a 38 y.o. female who presents for left ankle    Lower Extremity Issue  The symptoms are aggravated by movement.      Patient was just seen at the end of last month due to an ankle injury  Ultimately she did have x-rays that were performed and showed no acute fractures or anything else    At that time she was told to wear a walking boot and follow-up with myself in 2 weeks if continued symptoms occur    From the last note:  \"Patient stated that just yesterday she was walking out of her house down a flight of stairs and unfortunately twisted her ankle and fell down  She did not hear a pop or snap and is able to place weight on her left ankle but unfortunately she has severe pain that could not fully stop and unfortunately made it so she did not sleep last night  She did wrap up her ankle and is able to put weights as stated above but ultimately is unable to walk long distances  Wishes to discuss this further and now asking if this could even be a possible fracture or something else underlying  She does have a history of a sprained ankle in the past and now asking what the next step\"    Ultimately feeling much better but feels that her ankle joint is actually stiff    Asking to be further evaluate/treat at this time    Answers submitted by the patient for this visit:  Lower Extremity Injury Questionnaire (Submitted on 10/15/2023)  Chief Complaint: Lower extremity pain  Incident occurred: more than 1 week ago  Incident location: other  Injury mechanism: a fall, a twisting injury, other  Pain location: left ankle, left foot  Pain quality: aching, cramping, shooting, stabbing  Pain - numeric: 5/10  Pain course: improving  tingling: Yes  loss of motion: Yes  muscle weakness: Yes  Foreign body present: no foreign bodies      Review of Systems  All pertinent positive symptoms are included in the history of present illness.    All other systems have been reviewed and are " negative and noncontributory to this patient's current ailments.    Objective   /70 (BP Location: Left arm, Patient Position: Sitting, BP Cuff Size: Adult)   Pulse 67   Wt 59.8 kg (131 lb 12.8 oz)   BMI 21.27 kg/m²     Physical Exam  CONSTITUTIONAL - well nourished, well developed, looks like stated age, in no acute distress, not ill-appearing, and not tired appearing  SKIN - normal skin color and pigmentation, normal skin turgor without rash, lesions, or nodules visualized  HEAD - no trauma, normocephalic  EYES - normal external exam  CHEST -no distressed breathing, good effort  EXTREMITIES - no edema, no deformities, noted left ankle motion slightly decreased due to being in a walking boot, no pain on palpation  NEUROLOGICAL - normal balance, normal motor, no ataxia  PSYCHIATRIC - alert, pleasant and cordial, age-appropriate    Assessment/Plan   1.  Left ankle pain/sprain  I am happy to hear that you are doing much better    I recommend you get yourself out of the walking boot at this time and wear nice solid tennis shoe from here on after    Please try to do the alphabet as well as exercises that I performed and described to you during today's visit    If this continues to be an issue within the next few weeks, please let me know and I can gladly get you to a podiatrist to be further evaluate/treat    Keep up the great work and I am happy to hear that it is going well

## 2023-10-18 NOTE — RESULT ENCOUNTER NOTE
Complete blood cell count has a better white blood cell count at 11.6 now down from 13.6    Neutrophils still slightly elevated also but closer to normal    I am happy to see this and I would recommend we repeat within the next 3 months

## 2023-10-25 ENCOUNTER — APPOINTMENT (OUTPATIENT)
Dept: BEHAVIORAL HEALTH | Facility: CLINIC | Age: 38
End: 2023-10-25
Payer: COMMERCIAL

## 2023-11-03 ENCOUNTER — OFFICE VISIT (OUTPATIENT)
Dept: PRIMARY CARE | Facility: CLINIC | Age: 38
End: 2023-11-03
Payer: COMMERCIAL

## 2023-11-03 VITALS
DIASTOLIC BLOOD PRESSURE: 60 MMHG | BODY MASS INDEX: 21.63 KG/M2 | HEART RATE: 95 BPM | SYSTOLIC BLOOD PRESSURE: 112 MMHG | WEIGHT: 134 LBS

## 2023-11-03 DIAGNOSIS — N89.8 VAGINAL DISCHARGE: Primary | ICD-10-CM

## 2023-11-03 DIAGNOSIS — R31.9 HEMATURIA, UNSPECIFIED TYPE: ICD-10-CM

## 2023-11-03 LAB
APPEARANCE UR: ABNORMAL
BILIRUB UR STRIP.AUTO-MCNC: NEGATIVE MG/DL
COLOR UR: YELLOW
GLUCOSE UR STRIP.AUTO-MCNC: NEGATIVE MG/DL
KETONES UR STRIP.AUTO-MCNC: NEGATIVE MG/DL
LEUKOCYTE ESTERASE UR QL STRIP.AUTO: ABNORMAL
NITRITE UR QL STRIP.AUTO: NEGATIVE
PH UR STRIP.AUTO: 6 [PH]
POC APPEARANCE, URINE: CLEAR
POC BILIRUBIN, URINE: NEGATIVE
POC BLOOD, URINE: ABNORMAL
POC COLOR, URINE: ABNORMAL
POC GLUCOSE, URINE: NEGATIVE MG/DL
POC KETONES, URINE: NEGATIVE MG/DL
POC LEUKOCYTES, URINE: ABNORMAL
POC NITRITE,URINE: NEGATIVE
POC PH, URINE: 6 PH
POC PROTEIN, URINE: NEGATIVE MG/DL
POC SPECIFIC GRAVITY, URINE: 1.01
POC UROBILINOGEN, URINE: 0.2 EU/DL
PROT UR STRIP.AUTO-MCNC: NEGATIVE MG/DL
RBC # UR STRIP.AUTO: ABNORMAL /UL
RBC #/AREA URNS AUTO: ABNORMAL /HPF
SP GR UR STRIP.AUTO: 1.02
SQUAMOUS #/AREA URNS AUTO: ABNORMAL /HPF
UROBILINOGEN UR STRIP.AUTO-MCNC: <2 MG/DL
WBC #/AREA URNS AUTO: ABNORMAL /HPF

## 2023-11-03 PROCEDURE — 99214 OFFICE O/P EST MOD 30 MIN: CPT | Performed by: STUDENT IN AN ORGANIZED HEALTH CARE EDUCATION/TRAINING PROGRAM

## 2023-11-03 PROCEDURE — 81001 URINALYSIS AUTO W/SCOPE: CPT

## 2023-11-03 PROCEDURE — 81002 URINALYSIS NONAUTO W/O SCOPE: CPT | Performed by: STUDENT IN AN ORGANIZED HEALTH CARE EDUCATION/TRAINING PROGRAM

## 2023-11-03 PROCEDURE — 87086 URINE CULTURE/COLONY COUNT: CPT

## 2023-11-03 RX ORDER — METRONIDAZOLE 500 MG/1
500 TABLET ORAL 2 TIMES DAILY
Qty: 14 TABLET | Refills: 0 | Status: SHIPPED | OUTPATIENT
Start: 2023-11-03 | End: 2023-11-10

## 2023-11-03 NOTE — PROGRESS NOTES
Subjective   Patient ID: Rosibel Staton is a 38 y.o. female who presents for UTI.  Today she is accompanied by alone.     HPI  Vaginal Discharge  For the past week she has had a brown creamy discharge with a raw fish smell  She has had this issue on/off since May  She has tried to get into her OB-Gyn but hasn't been able to  Denies dysuria, discomfort while voiding, abdominal pain, and itching.  Urine sample in office showed hematuria and leukocytes  States she has a history of bacterial vaginosis, most recent in July and STI panel in July was negative       Current Outpatient Medications on File Prior to Visit   Medication Sig Dispense Refill    acetaminophen (TylenoL) 325 mg tablet Take by mouth.      ARIPiprazole (Abilify) 15 mg tablet Take 1 tablet (15 mg) by mouth once daily. 30 tablet 0    atorvastatin (Lipitor) 40 mg tablet Take 1 tablet (40 mg) by mouth once daily at bedtime.      clonazePAM (KlonoPIN) 1 mg tablet Take 1 tablet (1 mg) by mouth 3 times a day as needed.      cyclobenzaprine (Flexeril) 5 mg tablet Take 1 tablet (5 mg) by mouth 3 times a day. 30 tablet 0    hydrOXYzine pamoate (Vistaril) 50 mg capsule Take 1 capsule (50 mg) by mouth every 6 hours if needed for anxiety.      ibuprofen 200 mg tablet Take by mouth.      ondansetron (Zofran) 4 mg tablet Take 2 tablets (8 mg) by mouth every 12 hours if needed for nausea. 60 tablet 0    propranolol (Inderal) 10 mg tablet Take by mouth.      SUMAtriptan (Imitrex) 50 mg tablet Take 1 tablet (50 mg) by mouth 1 time if needed for migraine. May repeat after 2 hours. 30 tablet 0     No current facility-administered medications on file prior to visit.        Allergies   Allergen Reactions    Azithromycin Unknown    Bacitracin Zinc-Polymyxin B Unknown    Ciprofloxacin Unknown    Citalopram Unknown    Duloxetine Unknown    Gabapentin Unknown    Gadolinium-Containing Contrast Media Unknown    Lithium Unknown    Nickel Unknown    Nitrofurantoin  Monohyd/M-Cryst Unknown    Prednisone Unknown    Quetiapine Unknown    Sertraline Unknown    Sulfamethoxazole-Trimethoprim Unknown       Immunization History   Administered Date(s) Administered    Tdap vaccine, age 7 year and older (BOOSTRIX) 01/15/2014, 10/19/2015         Review of Systems  All pertinent positive symptoms are included in the history of present illness.  All other systems have been reviewed and are negative and noncontributory to this patient's current ailments.     Objective   /60 (BP Location: Left arm, Patient Position: Sitting, BP Cuff Size: Adult)   Pulse 95   Wt 60.8 kg (134 lb)   BMI 21.63 kg/m²   BSA: 1.68 meters squared  Lab on 10/18/2023   Component Date Value Ref Range Status    WBC 10/18/2023 11.6 (H)  4.4 - 11.3 x10*3/uL Final    nRBC 10/18/2023 0.0  0.0 - 0.0 /100 WBCs Final    RBC 10/18/2023 4.83  4.00 - 5.20 x10*6/uL Final    Hemoglobin 10/18/2023 13.8  12.0 - 16.0 g/dL Final    Hematocrit 10/18/2023 42.8  36.0 - 46.0 % Final    MCV 10/18/2023 89  80 - 100 fL Final    MCH 10/18/2023 28.6  26.0 - 34.0 pg Final    MCHC 10/18/2023 32.2  32.0 - 36.0 g/dL Final    RDW 10/18/2023 12.9  11.5 - 14.5 % Final    Platelets 10/18/2023 270  150 - 450 x10*3/uL Final    MPV 10/18/2023 12.2 (H)  7.5 - 11.5 fL Final    Neutrophils % 10/18/2023 68.5  40.0 - 80.0 % Final    Immature Granulocytes %, Automated 10/18/2023 0.4  0.0 - 0.9 % Final    Immature Granulocyte Count (IG) includes promyelocytes, myelocytes and metamyelocytes but does not include bands. Percent differential counts (%) should be interpreted in the context of the absolute cell counts (cells/UL).    Lymphocytes % 10/18/2023 23.7  13.0 - 44.0 % Final    Monocytes % 10/18/2023 5.6  2.0 - 10.0 % Final    Eosinophils % 10/18/2023 1.2  0.0 - 6.0 % Final    Basophils % 10/18/2023 0.6  0.0 - 2.0 % Final    Neutrophils Absolute 10/18/2023 7.93 (H)  1.20 - 7.70 x10*3/uL Final    Percent differential counts (%) should be interpreted in  the context of the absolute cell counts (cells/uL).    Immature Granulocytes Absolute, Au* 10/18/2023 0.05  0.00 - 0.70 x10*3/uL Final    Lymphocytes Absolute 10/18/2023 2.75  1.20 - 4.80 x10*3/uL Final    Monocytes Absolute 10/18/2023 0.65  0.10 - 1.00 x10*3/uL Final    Eosinophils Absolute 10/18/2023 0.14  0.00 - 0.70 x10*3/uL Final    Basophils Absolute 10/18/2023 0.07  0.00 - 0.10 x10*3/uL Final       Physical Exam  CONSTITUTIONAL - well nourished, well developed, looks like stated age, in no acute distress, not ill-appearing, and not tired appearing  SKIN - normal skin color and pigmentation, normal skin turgor without rash, lesions, or nodules visualized  HEAD - no trauma, normocephalic  EYES - normal external exam  CHEST -no distressed breathing, good effort  EXTREMITIES - no edema, no deformities  NEUROLOGICAL - normal balance, normal motor, no ataxia  PSYCHIATRIC - alert, pleasant and cordial, age-appropriate     Assessment/Plan   Vaginal Discharge  We will send your urine sample with culture  It is possible you have a bacterial infection as well as bacterial vaginosis   We will prescribe metronidazole 500 mg 2x a day for 7 days  I encourage you to schedule an appointment with your OB-Gyn

## 2023-11-04 LAB — BACTERIA UR CULT: NORMAL

## 2023-11-05 NOTE — RESULT ENCOUNTER NOTE
Urinalysis shows small blood and large leukocyte esterase    Microscopic urine also shows positivity to white blood cells and even red blood cells    But, the urine culture was completely negative and had no significant growth    I would recommend she continues the medication that I prescribed and the neck step would be following up with her OB/GYN or even a urologist if continued symptoms occur

## 2023-11-22 ENCOUNTER — TELEPHONE (OUTPATIENT)
Dept: OBSTETRICS AND GYNECOLOGY | Facility: CLINIC | Age: 38
End: 2023-11-22

## 2023-11-22 ENCOUNTER — TELEMEDICINE (OUTPATIENT)
Dept: BEHAVIORAL HEALTH | Facility: CLINIC | Age: 38
End: 2023-11-22
Payer: COMMERCIAL

## 2023-11-22 DIAGNOSIS — F33.1 MODERATE EPISODE OF RECURRENT MAJOR DEPRESSIVE DISORDER (MULTI): ICD-10-CM

## 2023-11-22 DIAGNOSIS — N95.2 ATROPHIC VAGINITIS: Primary | ICD-10-CM

## 2023-11-22 DIAGNOSIS — F41.1 GENERALIZED ANXIETY DISORDER: ICD-10-CM

## 2023-11-22 DIAGNOSIS — F17.210 CIGARETTE NICOTINE DEPENDENCE WITHOUT COMPLICATION: ICD-10-CM

## 2023-11-22 DIAGNOSIS — F43.10 PTSD (POST-TRAUMATIC STRESS DISORDER): ICD-10-CM

## 2023-11-22 PROCEDURE — 99214 OFFICE O/P EST MOD 30 MIN: CPT | Performed by: STUDENT IN AN ORGANIZED HEALTH CARE EDUCATION/TRAINING PROGRAM

## 2023-11-22 RX ORDER — ARIPIPRAZOLE 15 MG/1
15 TABLET ORAL DAILY
Qty: 90 TABLET | Refills: 3 | Status: SHIPPED | OUTPATIENT
Start: 2023-11-22 | End: 2024-11-21

## 2023-11-22 RX ORDER — ESTRADIOL 10 UG/1
10 INSERT VAGINAL 2 TIMES WEEKLY
Qty: 24 TABLET | Refills: 3 | Status: SHIPPED | OUTPATIENT
Start: 2023-11-23 | End: 2023-11-22 | Stop reason: SDUPTHER

## 2023-11-22 RX ORDER — HYDROXYZINE PAMOATE 50 MG/1
50 CAPSULE ORAL EVERY 6 HOURS PRN
Qty: 90 CAPSULE | Refills: 11 | Status: SHIPPED | OUTPATIENT
Start: 2023-11-22 | End: 2024-11-21

## 2023-11-22 RX ORDER — ESTRADIOL 10 UG/1
10 INSERT VAGINAL 2 TIMES WEEKLY
Qty: 34 TABLET | Refills: 3 | Status: SHIPPED | OUTPATIENT
Start: 2023-11-23 | End: 2024-11-22

## 2023-11-22 RX ORDER — PROPRANOLOL HYDROCHLORIDE 10 MG/1
10 TABLET ORAL 2 TIMES DAILY PRN
Qty: 60 TABLET | Refills: 11 | Status: SHIPPED | OUTPATIENT
Start: 2023-11-22 | End: 2024-05-09 | Stop reason: SDUPTHER

## 2023-11-22 RX ORDER — CLONAZEPAM 1 MG/1
1 TABLET ORAL 3 TIMES DAILY PRN
Qty: 90 TABLET | Refills: 2 | Status: SHIPPED | OUTPATIENT
Start: 2023-11-22 | End: 2024-05-09 | Stop reason: SDUPTHER
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
## 2023-11-22 NOTE — TELEPHONE ENCOUNTER
Patient states that when she was in for her Annual, that Dr. Berry stated that she could start and try VAGINAL ESTROGEN if she wanted to. She has talked with her other doctor, and they have decided that they want to try it. Please advise.     Pharmacy- [facility] in [place]

## 2023-11-30 ENCOUNTER — TELEPHONE (OUTPATIENT)
Dept: OBSTETRICS AND GYNECOLOGY | Facility: CLINIC | Age: 38
End: 2023-11-30
Payer: COMMERCIAL

## 2023-11-30 NOTE — TELEPHONE ENCOUNTER
Spoke with patient, started vaginal estrogen last week, notes mild headaches and dizziness the last couple days.  Discussed unlikely the estrogen is the cause of her symptoms as the medication is not taken orally and is localized.  Patient is willing to give some time to see if symptoms resolve.

## 2023-11-30 NOTE — TELEPHONE ENCOUNTER
Patient states that she started the vaginal estrogen after thanksgiving, and now she is feeling very dizzy. Would like to know if this is a side affect and if she should stop taking it. Please advise.

## 2023-12-01 ENCOUNTER — TELEMEDICINE (OUTPATIENT)
Dept: PRIMARY CARE | Facility: CLINIC | Age: 38
End: 2023-12-01
Payer: COMMERCIAL

## 2023-12-01 DIAGNOSIS — M62.838 CERVICAL PARASPINAL MUSCLE SPASM: Primary | ICD-10-CM

## 2023-12-01 DIAGNOSIS — G43.709 CHRONIC MIGRAINE WITHOUT AURA WITHOUT STATUS MIGRAINOSUS, NOT INTRACTABLE: ICD-10-CM

## 2023-12-01 PROCEDURE — 99214 OFFICE O/P EST MOD 30 MIN: CPT | Performed by: FAMILY MEDICINE

## 2023-12-01 RX ORDER — METHYLPREDNISOLONE 4 MG/1
TABLET ORAL
Qty: 21 TABLET | Refills: 0 | Status: SHIPPED | OUTPATIENT
Start: 2023-12-01 | End: 2023-12-08

## 2023-12-01 RX ORDER — CYCLOBENZAPRINE HCL 10 MG
10 TABLET ORAL 2 TIMES DAILY PRN
Qty: 30 TABLET | Refills: 0 | Status: SHIPPED | OUTPATIENT
Start: 2023-12-01 | End: 2024-01-08 | Stop reason: ALTCHOICE

## 2023-12-01 NOTE — ASSESSMENT & PLAN NOTE
We will try to break the migraine cycle with a Medrol Dosepak, and we also sent a muscle relaxant to the pharmacy for you especially because of the neck spasms that may be contributing to some of these migraine headaches    Please follow with Dr. Goins for chronic migraine management at next visit

## 2023-12-01 NOTE — PROGRESS NOTES
Subjective   Patient ID: Rosibel Staton is a 38 y.o. female who presents for Headache.    HPI  Has a history of migraines. Headaches have been worse over the past 7 to 8 days  Diagnosed with COVID back in April and states prior viral illness a couple of months back, but currently denies any flu/cold like symptoms other than headache  Previously been prescribed Medrol Dosepak and a muscle relaxant which helped with symptoms, requesting that again    Symptoms are worse in the morning upon waking up, states that they feel like her prior headaches  Currently on Beta Blockers for prophylaxis and sumatriptan PRN for abortive therapy, neither of which help symptoms  Describes headache as back of her head and neck. Denies any nuchal rigidity or fevers    Review of Systems  All pertinent positive symptoms are included in the history of present illness.    All other systems have been reviewed and are negative and noncontributory to this patient's current ailments.     Allergies   Allergen Reactions    Azithromycin Unknown    Bacitracin Zinc-Polymyxin B Unknown    Ciprofloxacin Unknown    Citalopram Unknown    Duloxetine Unknown    Gabapentin Unknown    Gadolinium-Containing Contrast Media Unknown    Lithium Unknown    Nickel Unknown    Nitrofurantoin Monohyd/M-Cryst Unknown    Prednisone Unknown    Quetiapine Unknown    Sertraline Unknown    Sulfamethoxazole-Trimethoprim Unknown        Immunization History   Administered Date(s) Administered    Tdap vaccine, age 7 year and older (BOOSTRIX) 01/15/2014, 10/19/2015     Objective   There were no vitals filed for this visit.    Physical Exam  CONSTITUTIONAL - well nourished, well developed, looks like stated age, in no acute distress, not ill-appearing, and not tired appearing  SKIN - normal skin color and pigmentation  EYES - normal external exam  LUNGS - breathing comfortably, no dyspnea  EXTREMITIES - no deformities noticeable  NEUROLOGICAL - oriented and no focal  signs  PSYCHIATRIC - alert, pleasant and cordial, age-appropriate, not anxious or depressed appearing     Assessment/Plan   Problem List Items Addressed This Visit       Chronic migraine without aura without status migrainosus, not intractable     We will try to break the migraine cycle with a Medrol Dosepak, and we also sent a muscle relaxant to the pharmacy for you especially because of the neck spasms that may be contributing to some of these migraine headaches    Please follow with Dr. Goins for chronic migraine management at next visit         Relevant Medications    cyclobenzaprine (Flexeril) 10 mg tablet    methylPREDNISolone (Medrol Dospak) 4 mg tablets    Cervical paraspinal muscle spasm - Primary    Relevant Medications    cyclobenzaprine (Flexeril) 10 mg tablet    methylPREDNISolone (Medrol Dospak) 4 mg tablets

## 2023-12-12 ENCOUNTER — OFFICE VISIT (OUTPATIENT)
Dept: PRIMARY CARE | Facility: CLINIC | Age: 38
End: 2023-12-12
Payer: COMMERCIAL

## 2023-12-12 ENCOUNTER — APPOINTMENT (OUTPATIENT)
Dept: PRIMARY CARE | Facility: CLINIC | Age: 38
End: 2023-12-12
Payer: COMMERCIAL

## 2023-12-12 VITALS
BODY MASS INDEX: 21.24 KG/M2 | DIASTOLIC BLOOD PRESSURE: 76 MMHG | WEIGHT: 131.6 LBS | HEART RATE: 97 BPM | SYSTOLIC BLOOD PRESSURE: 112 MMHG

## 2023-12-12 DIAGNOSIS — R11.0 NAUSEA: ICD-10-CM

## 2023-12-12 RX ORDER — ONDANSETRON 4 MG/1
8 TABLET, FILM COATED ORAL EVERY 12 HOURS PRN
Qty: 60 TABLET | Refills: 0 | Status: SHIPPED | OUTPATIENT
Start: 2023-12-12 | End: 2024-04-09 | Stop reason: SDUPTHER

## 2023-12-12 RX ORDER — NAPROXEN 500 MG/1
500 TABLET ORAL EVERY 12 HOURS PRN
COMMUNITY
Start: 2023-12-11 | End: 2023-12-25

## 2023-12-12 ASSESSMENT — PATIENT HEALTH QUESTIONNAIRE - PHQ9
1. LITTLE INTEREST OR PLEASURE IN DOING THINGS: NOT AT ALL
2. FEELING DOWN, DEPRESSED OR HOPELESS: NOT AT ALL
SUM OF ALL RESPONSES TO PHQ9 QUESTIONS 1 AND 2: 0

## 2023-12-12 NOTE — PROGRESS NOTES
Subjective   Patient ID: Rosibel Staton is a 38 y.o. female who presents for Concussion.  Today she is accompanied by alone.     HPI  ER follow-up/concussion  Patient was in the ER yesterday after a 50 pound bag of meat dropped on her head/neck.  She was trying to reach for it on the top shelf and dropped.    Current Outpatient Medications on File Prior to Visit   Medication Sig Dispense Refill    naproxen (Naprosyn) 500 mg tablet Take 1 tablet (500 mg) by mouth every 12 hours if needed.      acetaminophen (TylenoL) 325 mg tablet Take by mouth.      ARIPiprazole (Abilify) 15 mg tablet Take 1 tablet (15 mg) by mouth once daily. 90 tablet 3    atorvastatin (Lipitor) 40 mg tablet Take 1 tablet (40 mg) by mouth once daily at bedtime.      clonazePAM (KlonoPIN) 1 mg tablet Take 1 tablet (1 mg) by mouth 3 times a day as needed for anxiety. 90 tablet 2    cyclobenzaprine (Flexeril) 10 mg tablet Take 1 tablet (10 mg) by mouth 2 times a day as needed for muscle spasms. 30 tablet 0    estradiol (Vagifem) 10 mcg tablet vaginal tablet Insert 1 tablet (10 mcg) into the vagina 2 times a week. Insert one tablet into the vagina daily for 14 days then continue using twice weekly 34 tablet 3    hydrOXYzine pamoate (Vistaril) 50 mg capsule Take 1 capsule (50 mg) by mouth every 6 hours if needed for anxiety. 90 capsule 11    ibuprofen 200 mg tablet Take by mouth.      [] methylPREDNISolone (Medrol Dospak) 4 mg tablets Take as directed on package. 21 tablet 0    ondansetron (Zofran) 4 mg tablet Take 2 tablets (8 mg) by mouth every 12 hours if needed for nausea. 60 tablet 0    propranolol (Inderal) 10 mg tablet Take 1 tablet (10 mg) by mouth 2 times a day as needed (Anxiety). 60 tablet 11    SUMAtriptan (Imitrex) 50 mg tablet Take 1 tablet (50 mg) by mouth 1 time if needed for migraine. May repeat after 2 hours. 30 tablet 0     No current facility-administered medications on file prior to visit.        Allergies   Allergen  Reactions    Azithromycin Unknown    Bacitracin Zinc-Polymyxin B Unknown    Ciprofloxacin Unknown    Citalopram Unknown    Duloxetine Unknown    Gabapentin Unknown    Gadolinium-Containing Contrast Media Unknown    Lithium Unknown    Nickel Unknown    Nitrofurantoin Monohyd/M-Cryst Unknown    Prednisone Unknown    Quetiapine Unknown    Sertraline Unknown    Sulfamethoxazole-Trimethoprim Unknown       Immunization History   Administered Date(s) Administered    Tdap vaccine, age 7 year and older (BOOSTRIX) 01/15/2014, 10/19/2015         Review of Systems  All pertinent positive symptoms are included in the history of present illness.  All other systems have been reviewed and are negative and noncontributory to this patient's current ailments.     Objective   /76 (BP Location: Left arm, Patient Position: Sitting, BP Cuff Size: Adult)   Pulse 97   Wt 59.7 kg (131 lb 9.6 oz)   BMI 21.24 kg/m²   BSA: 1.67 meters squared  No visits with results within 1 Month(s) from this visit.   Latest known visit with results is:   Office Visit on 11/03/2023   Component Date Value Ref Range Status    POC Color, Urine 11/03/2023 Straw  Straw, Yellow, Light-Yellow Final    POC Appearance, Urine 11/03/2023 Clear  Clear Final    POC Specific Gravity, Urine 11/03/2023 1.015  1.005 - 1.035 Final    POC PH, Urine 11/03/2023 6.0  No Reference Range Established PH Final    POC Protein, Urine 11/03/2023 NEGATIVE  NEGATIVE, 30 (1+) mg/dl Final    POC Glucose, Urine 11/03/2023 NEGATIVE  NEGATIVE mg/dl Final    POC Blood, Urine 11/03/2023 MODERATE (2+) (A)  NEGATIVE Final    POC Ketones, Urine 11/03/2023 NEGATIVE  NEGATIVE mg/dl Final    POC Bilirubin, Urine 11/03/2023 NEGATIVE  NEGATIVE Final    POC Urobilinogen, Urine 11/03/2023 0.2  0.2, 1.0 EU/DL Final    Poc Nitrite, Urine 11/03/2023 NEGATIVE  NEGATIVE Final    POC Leukocytes, Urine 11/03/2023 MODERATE (2+) (A)  NEGATIVE Final    Color, Urine 11/03/2023 Yellow  Straw, Yellow Final     Appearance, Urine 11/03/2023 Hazy (N)  Clear Final    Specific Gravity, Urine 11/03/2023 1.016  1.005 - 1.035 Final    pH, Urine 11/03/2023 6.0  5.0, 5.5, 6.0, 6.5, 7.0, 7.5, 8.0 Final    Protein, Urine 11/03/2023 NEGATIVE  NEGATIVE mg/dL Final    Glucose, Urine 11/03/2023 NEGATIVE  NEGATIVE mg/dL Final    Blood, Urine 11/03/2023 SMALL (1+) (A)  NEGATIVE Final    Ketones, Urine 11/03/2023 NEGATIVE  NEGATIVE mg/dL Final    Bilirubin, Urine 11/03/2023 NEGATIVE  NEGATIVE Final    Urobilinogen, Urine 11/03/2023 <2.0  <2.0 mg/dL Final    Nitrite, Urine 11/03/2023 NEGATIVE  NEGATIVE Final    Leukocyte Esterase, Urine 11/03/2023 LARGE (3+) (A)  NEGATIVE Final    Urine Culture 11/03/2023 No significant growth   Final    WBC, Urine 11/03/2023 21-50 (A)  1-5, NONE /HPF Final    RBC, Urine 11/03/2023 11-20 (A)  NONE, 1-2, 3-5 /HPF Final    Squamous Epithelial Cells, Urine 11/03/2023 10-25 (FEW)  Reference range not established. /HPF Final       Physical Exam  CONSTITUTIONAL - well nourished, well developed, looks like stated age, in no acute distress, not ill-appearing, and not tired appearing  SKIN - normal skin color and pigmentation, normal skin turgor without rash, lesions, or nodules visualized  HEAD - no trauma, normocephalic  EYES - pupils are equal and reactive to light, extraocular muscles are intact, and normal external exam  ENT - TM's intact, no injection, no signs of infection, uvula midline, normal tongue movement and throat normal, no exudate, nasal passage without discharge and patent  NECK - supple without rigidity, no neck mass was observed, no thyromegaly or thyroid nodules  CHEST - clear to auscultation, no wheezing, no crackles and no rales, good effort  CARDIAC - regular rate and regular rhythm, no skipped beats, no murmur  ABDOMEN - no organomegaly, soft, nontender, nondistended, normal bowel sounds, no guarding/rebound/rigidity, negative McBurney sign and negative Nevarez sign  RECTAL - prostate is  smooth, not enlarged, nontender, no masses or nodules palpable; normal sphincter tone, no rectal masses  EXTREMITIES - no edema, no deformities  NEUROLOGICAL - normal gait, normal balance, normal motor, no ataxia, DTRs equal and symmetrical; alert, oriented and no focal signs  PSYCHIATRIC - alert, pleasant and cordial, age-appropriate  IMMUNOLOGIC - no cervical lymphadenopathy     Assessment/Plan   {Assess/[email or web address]:[id number]}

## 2023-12-20 ENCOUNTER — TELEMEDICINE (OUTPATIENT)
Dept: BEHAVIORAL HEALTH | Facility: CLINIC | Age: 38
End: 2023-12-20
Payer: COMMERCIAL

## 2023-12-20 DIAGNOSIS — F43.10 PTSD (POST-TRAUMATIC STRESS DISORDER): ICD-10-CM

## 2023-12-20 DIAGNOSIS — F41.1 GENERALIZED ANXIETY DISORDER: ICD-10-CM

## 2023-12-20 DIAGNOSIS — F33.1 MODERATE EPISODE OF RECURRENT MAJOR DEPRESSIVE DISORDER (MULTI): ICD-10-CM

## 2023-12-20 DIAGNOSIS — F17.210 CIGARETTE NICOTINE DEPENDENCE WITHOUT COMPLICATION: ICD-10-CM

## 2023-12-20 PROCEDURE — 99214 OFFICE O/P EST MOD 30 MIN: CPT | Performed by: STUDENT IN AN ORGANIZED HEALTH CARE EDUCATION/TRAINING PROGRAM
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
## 2024-01-08 ENCOUNTER — OFFICE VISIT (OUTPATIENT)
Dept: PRIMARY CARE | Facility: CLINIC | Age: 39
End: 2024-01-08
Payer: COMMERCIAL

## 2024-01-08 VITALS
SYSTOLIC BLOOD PRESSURE: 106 MMHG | DIASTOLIC BLOOD PRESSURE: 66 MMHG | HEART RATE: 83 BPM | BODY MASS INDEX: 21.98 KG/M2 | WEIGHT: 136.2 LBS

## 2024-01-08 DIAGNOSIS — G43.709 CHRONIC MIGRAINE WITHOUT AURA WITHOUT STATUS MIGRAINOSUS, NOT INTRACTABLE: ICD-10-CM

## 2024-01-08 DIAGNOSIS — R30.0 DYSURIA: Primary | ICD-10-CM

## 2024-01-08 DIAGNOSIS — M62.838 CERVICAL PARASPINAL MUSCLE SPASM: ICD-10-CM

## 2024-01-08 LAB
POC APPEARANCE, URINE: CLEAR
POC BILIRUBIN, URINE: NEGATIVE
POC BLOOD, URINE: NEGATIVE
POC COLOR, URINE: YELLOW
POC GLUCOSE, URINE: NEGATIVE MG/DL
POC KETONES, URINE: NEGATIVE MG/DL
POC LEUKOCYTES, URINE: NEGATIVE
POC NITRITE,URINE: NEGATIVE
POC PH, URINE: 6.5 PH
POC PROTEIN, URINE: NEGATIVE MG/DL
POC SPECIFIC GRAVITY, URINE: 1.01
POC UROBILINOGEN, URINE: 0.2 EU/DL

## 2024-01-08 PROCEDURE — 81003 URINALYSIS AUTO W/O SCOPE: CPT

## 2024-01-08 PROCEDURE — 81002 URINALYSIS NONAUTO W/O SCOPE: CPT | Performed by: STUDENT IN AN ORGANIZED HEALTH CARE EDUCATION/TRAINING PROGRAM

## 2024-01-08 PROCEDURE — 99214 OFFICE O/P EST MOD 30 MIN: CPT | Performed by: STUDENT IN AN ORGANIZED HEALTH CARE EDUCATION/TRAINING PROGRAM

## 2024-01-08 PROCEDURE — 87086 URINE CULTURE/COLONY COUNT: CPT

## 2024-01-08 RX ORDER — KETOROLAC TROMETHAMINE 10 MG/1
TABLET, FILM COATED ORAL
COMMUNITY
Start: 2023-12-28 | End: 2024-02-12 | Stop reason: WASHOUT

## 2024-01-08 RX ORDER — METAXALONE 800 MG/1
800 TABLET ORAL 3 TIMES DAILY PRN
COMMUNITY
Start: 2023-12-28

## 2024-01-08 ASSESSMENT — PATIENT HEALTH QUESTIONNAIRE - PHQ9
1. LITTLE INTEREST OR PLEASURE IN DOING THINGS: NOT AT ALL
SUM OF ALL RESPONSES TO PHQ9 QUESTIONS 1 AND 2: 0
2. FEELING DOWN, DEPRESSED OR HOPELESS: NOT AT ALL

## 2024-01-08 NOTE — PROGRESS NOTES
Subjective   Patient ID: Rosibel Staton is a 38 y.o. female who presents for pain when wiping.  Today she is accompanied by alone.     HPI  Dysuria  Patient has been experiencing pain when wiping after urination  Experiencing symptoms for 3 days  Reports increased frequency but notices some slight discomfort  Reports urethra is red and inflamed  This has not happened to her before  Patient has a history of kidney stones and even followed up with urology in the past  Now wondering if this could all be due to her passing a kidney stone wishes to discuss this further    2. Headache  Patient had an incident at work on 12/11 with a package of meat falling on her head and ultimately got a concussion   Neurology brought up the possible switch to Nortriptyline due to increase in frequency of headaches  Patient reports prior to incident, headaches were once a week now occurring daily  Wishes to get my opinion quickly but continues to follow-up with her neurologist as well as her psychiatrist and Workmen's Comp. provider    Current Outpatient Medications on File Prior to Visit   Medication Sig Dispense Refill    ketorolac (Toradol) 10 mg tablet TAKE 1 TABLET BY MOUTH THREE TIMES DAILY FOR 5 DAYS      metaxalone (Skelaxin) 800 mg tablet Take 1 tablet (800 mg) by mouth 3 times a day as needed.      acetaminophen (TylenoL) 325 mg tablet Take by mouth.      ARIPiprazole (Abilify) 15 mg tablet Take 1 tablet (15 mg) by mouth once daily. 90 tablet 3    atorvastatin (Lipitor) 40 mg tablet Take 1 tablet (40 mg) by mouth once daily at bedtime.      clonazePAM (KlonoPIN) 1 mg tablet Take 1 tablet (1 mg) by mouth 3 times a day as needed for anxiety. 90 tablet 2    cyclobenzaprine (Flexeril) 10 mg tablet Take 1 tablet (10 mg) by mouth 2 times a day as needed for muscle spasms. 30 tablet 0    estradiol (Vagifem) 10 mcg tablet vaginal tablet Insert 1 tablet (10 mcg) into the vagina 2 times a week. Insert one tablet into the vagina daily  for 14 days then continue using twice weekly 34 tablet 3    hydrOXYzine pamoate (Vistaril) 50 mg capsule Take 1 capsule (50 mg) by mouth every 6 hours if needed for anxiety. 90 capsule 11    ibuprofen 200 mg tablet Take by mouth.      ondansetron (Zofran) 4 mg tablet Take 2 tablets (8 mg) by mouth every 12 hours if needed for nausea. 60 tablet 0    propranolol (Inderal) 10 mg tablet Take 1 tablet (10 mg) by mouth 2 times a day as needed (Anxiety). 60 tablet 11    SUMAtriptan (Imitrex) 50 mg tablet Take 1 tablet (50 mg) by mouth 1 time if needed for migraine. May repeat after 2 hours. 30 tablet 0     No current facility-administered medications on file prior to visit.        Allergies   Allergen Reactions    Azithromycin Unknown    Bacitracin Zinc-Polymyxin B Unknown    Ciprofloxacin Unknown    Citalopram Unknown    Duloxetine Unknown    Gabapentin Unknown    Gadolinium-Containing Contrast Media Unknown    Lithium Unknown    Nickel Unknown    Nitrofurantoin Monohyd/M-Cryst Unknown    Prednisone Unknown    Quetiapine Unknown    Sertraline Unknown    Sulfamethoxazole-Trimethoprim Unknown       Immunization History   Administered Date(s) Administered    Tdap vaccine, age 7 year and older (BOOSTRIX) 01/15/2014, 10/19/2015         Review of Systems  All pertinent positive symptoms are included in the history of present illness.  All other systems have been reviewed and are negative and noncontributory to this patient's current ailments.     Objective   /66 (BP Location: Left arm, Patient Position: Sitting, BP Cuff Size: Adult)   Pulse 83   Wt 61.8 kg (136 lb 3.2 oz)   BMI 21.98 kg/m²   BSA: 1.7 meters squared      Physical Exam  CONSTITUTIONAL - well nourished, well developed, looks like stated age, in no acute distress, not ill-appearing, and not tired appearing  SKIN - normal skin color and pigmentation, normal skin turgor without rash, lesions, or nodules visualized  HEAD - no trauma, normocephalic  EYES -  normal external exam  ENT - TM's intact, no injection, no signs of infection  NECK - supple without rigidity, no neck mass was observed, no thyromegaly or thyroid nodules  CHEST - clear to auscultation, no wheezing, no crackles and no rales, good effort  CARDIAC - regular rate and regular rhythm, no skipped beats, no murmur  ABDOMEN - no organomegaly, soft, nontender, nondistended, normal bowel sounds, no guarding/rebound/rigidity, negative McBurney sign and negative Nevarez sign  EXTREMITIES - no edema, no deformities  NEUROLOGICAL - normal gait, normal balance, normal motor, no ataxia  PSYCHIATRIC - alert, pleasant and cordial, age-appropriate  IMMUNOLOGIC - no cervical lymphadenopathy     Assessment/Plan   Dysuria  Urinalysis ordered and was within normal limits here in the office  We ultimately sent this for a proper urinalysis as well as a urine culture to be fully thorough  I would recommend that you follow-up with your OB/GYN as well as your urologist if continued or worsening symptoms occur  Please keep me in the loop if this continues to be an issue    2. Headache  Please follow up with your neurologist per their recommendations  We did discuss briefly on the pros and cons of taking nortriptyline  Please contact your psychiatrist to make sure there are no medication interactions if you decide to do this

## 2024-01-09 ENCOUNTER — TELEPHONE (OUTPATIENT)
Dept: BEHAVIORAL HEALTH | Facility: CLINIC | Age: 39
End: 2024-01-09
Payer: MEDICARE

## 2024-01-09 LAB
APPEARANCE UR: CLEAR
BACTERIA UR CULT: NORMAL
BILIRUB UR STRIP.AUTO-MCNC: NEGATIVE MG/DL
COLOR UR: YELLOW
GLUCOSE UR STRIP.AUTO-MCNC: NEGATIVE MG/DL
KETONES UR STRIP.AUTO-MCNC: NEGATIVE MG/DL
LEUKOCYTE ESTERASE UR QL STRIP.AUTO: NEGATIVE
NITRITE UR QL STRIP.AUTO: NEGATIVE
PH UR STRIP.AUTO: 6 [PH]
PROT UR STRIP.AUTO-MCNC: NEGATIVE MG/DL
RBC # UR STRIP.AUTO: NEGATIVE /UL
SP GR UR STRIP.AUTO: 1.01
UROBILINOGEN UR STRIP.AUTO-MCNC: <2 MG/DL

## 2024-01-09 NOTE — RESULT ENCOUNTER NOTE
Urinalysis completely normal and we will wait for the urine culture    If this continues to be an issue I would recommend she follows up with her urologist and/or her OB/GYN

## 2024-01-11 ENCOUNTER — APPOINTMENT (OUTPATIENT)
Dept: PHYSICAL THERAPY | Facility: CLINIC | Age: 39
End: 2024-01-11
Payer: COMMERCIAL
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
## 2024-01-15 ENCOUNTER — EVALUATION (OUTPATIENT)
Dept: PHYSICAL THERAPY | Facility: CLINIC | Age: 39
End: 2024-01-15
Payer: COMMERCIAL

## 2024-01-15 DIAGNOSIS — S09.90XD INJURY OF HEAD, SUBSEQUENT ENCOUNTER: Primary | ICD-10-CM

## 2024-01-15 DIAGNOSIS — S16.1XXD STRAIN OF NECK MUSCLE, SUBSEQUENT ENCOUNTER: ICD-10-CM

## 2024-01-15 PROCEDURE — 97161 PT EVAL LOW COMPLEX 20 MIN: CPT | Mod: GP | Performed by: PHYSICAL THERAPIST

## 2024-01-15 PROCEDURE — 97110 THERAPEUTIC EXERCISES: CPT | Mod: GP | Performed by: PHYSICAL THERAPIST

## 2024-01-15 ASSESSMENT — PAIN - FUNCTIONAL ASSESSMENT: PAIN_FUNCTIONAL_ASSESSMENT: 0-10

## 2024-01-15 ASSESSMENT — ENCOUNTER SYMPTOMS
LOSS OF SENSATION IN FEET: 0
DEPRESSION: 1
OCCASIONAL FEELINGS OF UNSTEADINESS: 0

## 2024-01-16 ENCOUNTER — APPOINTMENT (OUTPATIENT)
Dept: CARDIOLOGY | Facility: CLINIC | Age: 39
End: 2024-01-16
Payer: MEDICARE

## 2024-01-16 PROBLEM — F41.9 ANXIETY: Status: ACTIVE | Noted: 2023-03-08

## 2024-01-16 PROBLEM — Z86.59 HISTORY OF DEPRESSION: Status: ACTIVE | Noted: 2024-01-16

## 2024-01-16 PROBLEM — U07.1 DISEASE DUE TO SEVERE ACUTE RESPIRATORY SYNDROME CORONAVIRUS 2 (SARS-COV-2): Status: ACTIVE | Noted: 2024-01-16

## 2024-01-16 PROBLEM — M25.579 ACUTE ANKLE PAIN: Status: ACTIVE | Noted: 2024-01-16

## 2024-01-16 PROBLEM — Z86.79 HISTORY OF HYPERTENSION: Status: ACTIVE | Noted: 2024-01-16

## 2024-01-16 PROBLEM — E83.59 CALCIUM OXALATE CALCULUS: Status: ACTIVE | Noted: 2024-01-16

## 2024-01-16 RX ORDER — ASCORBIC ACID 250 MG
250 TABLET ORAL DAILY
COMMUNITY
End: 2024-02-12 | Stop reason: WASHOUT

## 2024-01-16 RX ORDER — TAMSULOSIN HYDROCHLORIDE 0.4 MG/1
0.4 CAPSULE ORAL DAILY
COMMUNITY
Start: 2016-10-22 | End: 2024-02-12 | Stop reason: WASHOUT

## 2024-01-16 RX ORDER — HYDROGEN PEROXIDE 3 %
2 SOLUTION, NON-ORAL MISCELLANEOUS
COMMUNITY
End: 2024-02-12 | Stop reason: WASHOUT

## 2024-01-16 RX ORDER — DULOXETIN HYDROCHLORIDE 30 MG/1
30 CAPSULE, DELAYED RELEASE ORAL DAILY
COMMUNITY
Start: 2014-09-29 | End: 2024-02-12 | Stop reason: WASHOUT

## 2024-01-16 RX ORDER — CITALOPRAM 40 MG/1
40 TABLET, FILM COATED ORAL DAILY
COMMUNITY
End: 2024-02-12 | Stop reason: WASHOUT

## 2024-01-16 RX ORDER — PANTOPRAZOLE SODIUM 20 MG/1
20 TABLET, DELAYED RELEASE ORAL
COMMUNITY
Start: 2014-09-29 | End: 2024-02-12 | Stop reason: WASHOUT

## 2024-01-16 RX ORDER — NORETHINDRONE 5 MG/1
5 TABLET ORAL DAILY
COMMUNITY
Start: 2020-03-18 | End: 2024-02-12 | Stop reason: WASHOUT

## 2024-01-16 RX ORDER — VIT C/E/ZN/COPPR/LUTEIN/ZEAXAN 250MG-90MG
3 CAPSULE ORAL DAILY
COMMUNITY
End: 2024-02-12 | Stop reason: WASHOUT

## 2024-01-16 RX ORDER — LISINOPRIL 2.5 MG/1
2.5 TABLET ORAL DAILY
COMMUNITY
Start: 2014-09-29 | End: 2024-02-12 | Stop reason: WASHOUT

## 2024-01-16 RX ORDER — GABAPENTIN 300 MG/1
300 CAPSULE ORAL EVERY 12 HOURS
COMMUNITY
Start: 2014-09-29 | End: 2024-02-12 | Stop reason: WASHOUT

## 2024-01-16 RX ORDER — NORTRIPTYLINE HYDROCHLORIDE 10 MG/1
2 CAPSULE ORAL NIGHTLY
COMMUNITY
Start: 2024-01-02 | End: 2024-04-09 | Stop reason: WASHOUT

## 2024-01-16 RX ORDER — CLONIDINE HYDROCHLORIDE 0.1 MG/1
0.1 TABLET ORAL NIGHTLY
COMMUNITY
Start: 2014-09-29 | End: 2024-02-12 | Stop reason: WASHOUT

## 2024-01-17 ENCOUNTER — TELEMEDICINE (OUTPATIENT)
Dept: CARDIOLOGY | Facility: CLINIC | Age: 39
End: 2024-01-17
Payer: COMMERCIAL

## 2024-01-17 DIAGNOSIS — G90.3 NEUROGENIC ORTHOSTATIC HYPOTENSION (MULTI): Primary | ICD-10-CM

## 2024-01-17 PROCEDURE — 99212 OFFICE O/P EST SF 10 MIN: CPT | Performed by: INTERNAL MEDICINE

## 2024-01-17 RX ORDER — ATORVASTATIN CALCIUM 40 MG/1
40 TABLET, FILM COATED ORAL NIGHTLY
Qty: 90 TABLET | Refills: 3 | Status: SHIPPED | OUTPATIENT
Start: 2024-01-17 | End: 2025-01-16

## 2024-01-17 NOTE — PROGRESS NOTES
Primary Care Physician: Charli Cassidy DO  Date of Visit: 01/17/2024 11:40 AM EST  Location of visit: [facility] [place] [address]     Chief Complaint:   No chief complaint on file.       HPI / Summary:   Rosibel Staton is a 38 y.o. female presents for followup.     Cigarettes down to 1/2 ppd.    Out of work since concussion in December.  55 pound of meat fell on head.  Avoid red meat and smoked.    No CP, no avilez.    Specialty Problems          Cardiology Problems    Hyperlipidemia    Hypotension    Nicotine dependence    Palpitations    History of hypertension        Past Medical History:   Diagnosis Date    Cough, unspecified 04/27/2018    Cough    Other cervical disc displacement, unspecified cervical region 10/03/2016    Cervical disc herniation    Personal history of other diseases of the circulatory system     History of hypertension    Personal history of other diseases of the digestive system     History of esophageal reflux    Personal history of other diseases of the musculoskeletal system and connective tissue 05/04/2016    History of herniated intervertebral disc    Personal history of other diseases of the respiratory system 01/06/2017    History of deviated nasal septum    Personal history of other diseases of urinary system     History of bladder problems    Personal history of other infectious and parasitic diseases 03/05/2020    History of candidiasis of mouth    Personal history of other mental and behavioral disorders     History of depression    Personal history of urinary (tract) infections 02/24/2021    History of urinary tract infection    Snoring     Snoring          Past Surgical History:   Procedure Laterality Date    OTHER SURGICAL HISTORY  10/24/2016    Drainage Of Pelvic Abscess    OTHER SURGICAL HISTORY  05/25/2022    Oophorectomy bilateral    OTHER SURGICAL HISTORY  07/16/2019    Laparoscopic hysterectomy    OTHER SURGICAL HISTORY  01/06/2017    Abdominal Surgery          Social History:    reports that she has been smoking cigarettes. She has never used smokeless tobacco.      Allergies:  Allergies   Allergen Reactions    Azithromycin Unknown    Bacitracin Zinc-Polymyxin B Unknown    Ciprofloxacin Unknown    Citalopram Unknown    Duloxetine Unknown    Gabapentin Unknown    Gadolinium-Containing Contrast Media Unknown    Lithium Unknown    Nickel Unknown    Nitrofurantoin Monohyd/M-Cryst Unknown    Prednisone Unknown    Quetiapine Unknown    Sertraline Unknown    Sulfamethoxazole-Trimethoprim Unknown    Amoxicillin-Pot Clavulanate Unknown    Metaxalone Unknown    Scopolamine Unknown    Tramadol Unknown       Outpatient Medications:  Current Outpatient Medications   Medication Instructions    acetaminophen (TylenoL) 325 mg tablet oral    ARIPiprazole (ABILIFY) 15 mg, oral, Daily    ascorbic acid (VITAMIN C) 250 mg, oral, Daily    atorvastatin (Lipitor) 40 mg tablet 1 tablet, oral, Nightly    brexpiprazole (Rexulti) 0.5 mg tablet 1 tablet, oral, Daily    cholecalciferol (Vitamin D-3) 25 MCG (1000 UT) capsule 3 capsules, oral, Daily    citalopram (CELEXA) 40 mg, oral, Daily    clonazePAM (KLONOPIN) 1 mg, oral, 3 times daily PRN    cloNIDine (CATAPRES) 0.1 mg, oral, Nightly    DULoxetine (CYMBALTA) 30 mg, oral, Daily    esomeprazole (NexIUM) 20 mg DR capsule 2 capsules, oral, Daily before breakfast    estradiol (VAGIFEM) 10 mcg, vaginal, 2 times weekly, Insert one tablet into the vagina daily for 14 days then continue using twice weekly    gabapentin (NEURONTIN) 300 mg, oral, Every 12 hours    hydrOXYzine pamoate (VISTARIL) 50 mg, oral, Every 6 hours PRN    ibuprofen 200 mg tablet oral    ketorolac (Toradol) 10 mg tablet TAKE 1 TABLET BY MOUTH THREE TIMES DAILY FOR 5 DAYS    lisinopril 2.5 mg, oral, Daily    metaxalone (SKELAXIN) 800 mg, oral, 3 times daily PRN    norethindrone (AYGESTIN) 5 mg, oral, Daily    nortriptyline (Pamelor) 10 mg capsule 2 capsules, oral, Nightly    ondansetron (ZOFRAN) 8 mg,  oral, Every 12 hours PRN    pantoprazole (PROTONIX) 20 mg, oral, Daily before breakfast    propranolol (INDERAL) 10 mg, oral, 2 times daily PRN    SUMAtriptan (IMITREX) 50 mg, oral, Once as needed, May repeat after 2 hours.    tamsulosin (FLOMAX) 0.4 mg, oral, Daily       ROS     Physical Exam:  There were no vitals filed for this visit.  Wt Readings from Last 5 Encounters:   01/08/24 61.8 kg (136 lb 3.2 oz)   12/12/23 59.7 kg (131 lb 9.6 oz)   11/03/23 60.8 kg (134 lb)   10/18/23 59.8 kg (131 lb 12.8 oz)   08/30/23 61.2 kg (135 lb)     There is no height or weight on file to calculate BMI.        Last Labs:  CMP:  Recent Labs     08/30/23  1106 02/17/22  0257 04/06/21  0937 12/02/20  1700 11/13/20  1138 12/30/19  1945 12/21/19  2306    140  --   --  142 141 138   K 4.5 4.3  --   --  4.3 3.5 3.6    105  --   --  103 104 103   CO2 27 29  --   --  32 29 28   ANIONGAP 13 10  --   --  11 12 11   BUN 8 15  --   --  12 8 7   CREATININE 0.63 0.97  --   --  0.78 0.68 0.62   GLUCOSE 86 109* 75 81 80 89 95     Recent Labs     08/30/23  1106 02/17/22  0257 04/06/21  0937 11/13/20  1138 12/30/19  1945 12/21/19  2306 09/30/19  1605 09/30/19  0559 06/13/18  1100 05/25/18  1616 01/05/18  1316 10/12/17  1453   ALBUMIN 4.5 4.3  --  4.7 4.5 4.6   < > 4.6   < > 4.6   < > 4.6   ALKPHOS 76 86  --  66 42 44  --  49   < > 51   < > 48   ALT 20 15 39 12 9 10  --  13   < > 15   < > 12   AST 17 18  --  14 12 13  --  15   < > 13   < > 14   BILITOT 0.5 0.3  --  0.4 0.3 0.3  --  1.1   < > 0.3   < > 0.5   LIPASE  --   --   --   --  7* 9  --  3*  --  12  --  7*    < > = values in this interval not displayed.     CBC:  Recent Labs     10/18/23  1028 08/30/23  1106 02/17/22  0257 11/13/20  1138 12/30/19  1945   WBC 11.6* 13.1* 13.6* 9.3 13.7*   HGB 13.8 15.2 13.9 14.6 13.4   HCT 42.8 46.6* 41.1 47.4* 40.7    264 244 267 214   MCV 89 89 85 92 91     COAG:   Recent Labs     10/01/19  0714 09/30/19  0559 07/28/19  2258 06/14/19  1030  "06/22/18  0000   INR 1.3* 1.4* 1.1 1.1 1.1     HEME/ENDO:  Recent Labs     08/30/23  1106 04/14/22  1036 11/13/20  1138 10/02/19  0616 05/22/18  1548   FERRITIN  --   --   --  163* 28   IRONSAT  --   --   --  10* 22*   TSH 0.45 0.90 0.69 0.49 0.70   HGBA1C 6.0*  --   --   --   --       CARDIAC: No results for input(s): \"LDH\", \"CKMB\", \"TROPHS\", \"BNP\" in the last 20299 hours.    No lab exists for component: \"CK\", \"CKMBP\"  Recent Labs     08/30/23  1106 04/06/21  0937 12/02/20  1700 09/11/18  0656   CHOL 162 186 281* 120   LDLF 88 99  --  60   HDL 60.4 71.0 71.8 51.4   TRIG 66 79  --  45       Last Cardiology Tests:  ECG:      Echo:  Echo Results:  No results found for this or any previous visit from the past 3650 days.       Cath:      Stress Test:  Stress Results:  No results found for this or any previous visit from the past 365 days.         Cardiac Imaging:  MR breast bilateral w IV contrast fast screening self pay  Narrative: Interpreted By:  Anaya Bennett and Avery Ross   STUDY:  BI MR BREAST BILATERAL WITH CONTRAST FAST SCREENING SELF PAY;  10/4/2023 6:07 pm      ACCESSION NUMBER(S):  [id number]      ORDERING CLINICIAN:  SALOME MOORE      INDICATION:  Dense breast tissue on mammography.      COMPARISON:  Mammogram 06/08/2018.      TECHNIQUE:  Using a dedicated breast coil, STIR axial and T1-weighted fat  saturation axial images of the breasts were obtained, the latter both  before and after intravenous administration of Gadolinium DTPA.      Intravenous contrast:  12 mL of Dotarem      FINDINGS:  There is  symmetric  minimal bilateral background enhancement.      There is extreme fibroglandular tissue.      RIGHT BREAST:  No suspicious mass or nonmass enhancement is  identified.      No axillary or internal mammary lymphadenopathy is appreciated.      LEFT BREAST:  No suspicious mass or nonmass enhancement is identified.      No axillary or internal mammary lymphadenopathy is appreciated.    "   NON-BREAST FINDINGS:  A nonenhancing STIR hyperintense hepatic  segment 3 lesion measures 0.8 cm (series [id number], image 186 of 200),  consistent with a hepatic cyst.      Impression: No MRI evidence of malignancy in either breast.      BI-RADS CATEGORY:      BI-RADS Category:  1 Negative.  Recommendation:  Routine Screening Mammogram in 1 Year.  Recommended Date:  1 Year.  Laterality:  Bilateral.      For any future breast imaging appointments, please call [phone]).          MACRO:  None      Signed by: Anaya Bennett 10/5/2023 11:27 AM  Dictation workstation:   [id number]        Assessment/Plan     38-year-old female with strong family history of ASCVD, elevated LDL cholesterol and prediabetes, tobacco dependence down to half pack a day, LDL currently at goal, doing well on diet.  Recent concussion and is off from work.  No concerning cardiac symptoms.  Requested medications renewed.  Phone visit 10-minute duration annual follow-up advised      Orders:  No orders of the defined types were placed in this encounter.     Followup Appts:  Future Appointments   Date Time Provider Department Center   1/19/2024 11:15 AM Amarilys R Soncrant, PT [facility]PT [facility]   1/23/2024  2:00 PM Amarilys R Soncrant, PT [facility]   1/25/2024 10:30 AM Amarilys R Soncrant, PT [facility]   1/29/2024  9:00 AM Amarilys R Soncrant, PT [facility]   2/1/2024 10:30 AM Amarilys R Soncrant, PT [facility]   2/5/2024 10:30 AM Amarilys R Soncrant, PT [facility]   2/8/2024  9:45 AM Amarilys R Soncrant, PT [facility]   2/12/2024 10:30 AM Amarilys R Soncrant, PT [facility]   2/12/2024  1:30 PM Benito Sanders MD [id number] [facility]   2/15/2024  9:45 AM Amarilys R Soncrant, PT [facility]   2/19/2024  9:00 AM Amarilys R Soncrant, PT [facility]   2/22/2024  9:45 AM Amarilys R Soncrant, PT [facility]   2/26/2024  9:00 AM [facility]   2/29/2024  9:45 AM Amarilys Vogt PT Lexie [facility]   3/6/2024  9:15 AM [facility]  [id number] ULTRASOUND [id number] [facility]   9/11/2024 10:00 AM Hanane Berry MD [id number]           ____________________________________________________________  Gareth Wilkins MD    Senior Attending Physician  [facility] [place]  [facility]

## 2024-01-19 ENCOUNTER — TREATMENT (OUTPATIENT)
Dept: PHYSICAL THERAPY | Facility: CLINIC | Age: 39
End: 2024-01-19
Payer: COMMERCIAL

## 2024-01-19 DIAGNOSIS — S09.90XD INJURY OF HEAD, SUBSEQUENT ENCOUNTER: ICD-10-CM

## 2024-01-19 DIAGNOSIS — S16.1XXD STRAIN OF NECK MUSCLE, SUBSEQUENT ENCOUNTER: ICD-10-CM

## 2024-01-19 PROCEDURE — 97140 MANUAL THERAPY 1/> REGIONS: CPT | Mod: GP | Performed by: PHYSICAL THERAPIST

## 2024-01-19 ASSESSMENT — PAIN SCALES - GENERAL: PAINLEVEL_OUTOF10: 5 - MODERATE PAIN

## 2024-01-19 ASSESSMENT — PAIN - FUNCTIONAL ASSESSMENT: PAIN_FUNCTIONAL_ASSESSMENT: 0-10

## 2024-01-19 NOTE — PROGRESS NOTES
"Physical Therapy    Physical Therapy Treatment    Patient Name: Rosibel Staton  MRN: [id number]  Today's Date: 1/19/2024         Assessment:   Patient with decreased pain and increased cervical rotation after manual therapy  Plan:   Add pec stretch and start cervical/thoracic stretching    Current Problem  1. Injury of head, subsequent encounter  Follow Up In Physical Therapy      2. Strain of neck muscle, subsequent encounter  Follow Up In Physical Therapy          General     General  General Comment: visit 2    Subjective    Patient states that she feels the stretching is help, patient states that heat is also helping.    Pain  Pain Assessment  Pain Assessment: 0-10  Pain Score: 5 - Moderate pain  Pain Location:  (back of neck and right shoulder)    Objective     Treatments:  Check for BPPV  Add pec stretch and start cervical/thoracic stretching    Manual therapy:  MFR to middle scalenes, upper trap, right > left levator scap left>right    Stretch reviewed for correct technique with good performance.    Therapeutic Ex  Use of towel roll for decreased cervical thoracic stress and strain  Use of heat 20 min or ice 10 min every hour for pain control  Meditation for relaxation, pain control and sleep  No naps during the day unless normal sleep at night  Walk for 30 minutes daily  Levator scap stretch 3 times 30\" * mod cues for pull to stretch not pain  Upper trap 3 times 30\" * mod cues for pull to stretch not pain    OP EDUCATION:   Need to use heat, over the counter pain killer and stretching to control increased pain after manual therapy due to the amount of change in cervical tone.    Goals:  Active       PT Problem       Patient decreased neck disability index score to 16 points for self reported decline in symptoms.        Start:  01/15/24    Expected End:  04/14/24            Patient will increase cervical rotation to 0-60 degrees to allow the patient to scan environment without pain.        Start:  01/15/24   "  Expected End:  04/14/24            Patient decrease DHI to 20 points for self reported improvement in function.        Start:  01/15/24    Expected End:  04/14/24            Patient report headaches at 0-4/10 to allow increased ease in daily tasks       Start:  01/15/24    Expected End:  04/14/24            Patient will be independent in Home Exercise Program to manage symptoms and maximize their functional independence.        Start:  01/15/24    Expected End:  04/14/24            Patient Goal: Pain releif       Start:  01/15/24    Expected End:  04/14/24

## 2024-01-23 ENCOUNTER — TREATMENT (OUTPATIENT)
Dept: PHYSICAL THERAPY | Facility: CLINIC | Age: 39
End: 2024-01-23
Payer: COMMERCIAL

## 2024-01-23 DIAGNOSIS — S09.90XD INJURY OF HEAD, SUBSEQUENT ENCOUNTER: Primary | ICD-10-CM

## 2024-01-23 DIAGNOSIS — S16.1XXD STRAIN OF NECK MUSCLE, SUBSEQUENT ENCOUNTER: ICD-10-CM

## 2024-01-23 PROCEDURE — 97110 THERAPEUTIC EXERCISES: CPT | Mod: GP | Performed by: PHYSICAL THERAPIST

## 2024-01-23 PROCEDURE — 97140 MANUAL THERAPY 1/> REGIONS: CPT | Mod: GP | Performed by: PHYSICAL THERAPIST

## 2024-01-23 ASSESSMENT — PAIN SCALES - GENERAL: PAINLEVEL_OUTOF10: 5 - MODERATE PAIN

## 2024-01-23 ASSESSMENT — PAIN - FUNCTIONAL ASSESSMENT: PAIN_FUNCTIONAL_ASSESSMENT: 0-10

## 2024-01-23 NOTE — PROGRESS NOTES
"Physical Therapy    Physical Therapy Treatment    Patient Name: Rosibel Staton  MRN: [id number]  Today's Date: 1/23/2024         Assessment:   Patient with good pain reduction with stretching address her areas of continued pain.  Plan:   Add cervical rotation and SCM stretch     Current Problem  No diagnosis found.    General     General  General Comment: visit 3    Subjective    Patient states that she has done stretching, OTC pain killers and heat.  Patient stretch at least one time an hour.      Pain  Pain Assessment  Pain Assessment: 0-10  Pain Score: 5 - Moderate pain    Objective     Treatments:  Add cervical rotation and SCM stretch   Check for BPPV    Manual therapy:  MFR to middle scalenes, upper trap, right > left levator scap left>right rhomboids bilaterally right >left    Use of tennis/raquette ball in pillow case for trigger point self release    Therapeutic Ex  1/23/2024:  Door way rhomboid stretch 2 min  Door way pec stretch 2 min 3 positions  Supine chin tucks 10 times 1 set * hold 5 sec  Supine scapular retraction 10 times 1 set * hold 5 sec    PRIOR:  Use of towel roll for decreased cervical thoracic stress and strain  Use of heat 20 min or ice 10 min every hour for pain control  Meditation for relaxation, pain control and sleep  No naps during the day unless normal sleep at night  Walk for 30 minutes daily  Levator scap stretch 3 times 30\" * mod cues for pull to stretch not pain  Upper trap 3 times 30\" * mod cues for pull to stretch not pain    OP EDUCATION:        Need to use heat, over the counter pain killer and stretching to control increased pain after manual therapy due to the amount of change in cervical tone.    Goals:  Active       PT Problem       Patient decreased neck disability index score to 16 points for self reported decline in symptoms.        Start:  01/15/24    Expected End:  04/14/24            Patient will increase cervical rotation to 0-60 degrees to allow the patient to " scan environment without pain.        Start:  01/15/24    Expected End:  04/14/24            Patient decrease DHI to 20 points for self reported improvement in function.        Start:  01/15/24    Expected End:  04/14/24            Patient report headaches at 0-4/10 to allow increased ease in daily tasks       Start:  01/15/24    Expected End:  04/14/24            Patient will be independent in Home Exercise Program to manage symptoms and maximize their functional independence.        Start:  01/15/24    Expected End:  04/14/24            Patient Goal: Pain releif       Start:  01/15/24    Expected End:  04/14/24

## 2024-01-25 ENCOUNTER — TREATMENT (OUTPATIENT)
Dept: PHYSICAL THERAPY | Facility: CLINIC | Age: 39
End: 2024-01-25
Payer: COMMERCIAL

## 2024-01-25 DIAGNOSIS — S16.1XXD STRAIN OF NECK MUSCLE, SUBSEQUENT ENCOUNTER: ICD-10-CM

## 2024-01-25 DIAGNOSIS — S09.90XD INJURY OF HEAD, SUBSEQUENT ENCOUNTER: ICD-10-CM

## 2024-01-25 PROCEDURE — 97140 MANUAL THERAPY 1/> REGIONS: CPT | Mod: GP | Performed by: PHYSICAL THERAPIST

## 2024-01-25 ASSESSMENT — PAIN - FUNCTIONAL ASSESSMENT: PAIN_FUNCTIONAL_ASSESSMENT: 0-10

## 2024-01-25 ASSESSMENT — PAIN SCALES - GENERAL: PAINLEVEL_OUTOF10: 8

## 2024-01-25 NOTE — PROGRESS NOTES
"Physical Therapy    Physical Therapy Treatment    Patient Name: Rosibel Staton  MRN: [id number]  Today's Date: 1/25/2024  Time Calculation  Start Time: 1030  Stop Time: 1115  Time Calculation (min): 45 min      Assessment:  Pain at end of session 2/10.  Patient with decreased cervical tone as well as temporalis and pterygoid muscles.     Plan:   Add cervical rotation, and SCM stretch    Current Problem  1. Injury of head, subsequent encounter  Follow Up In Physical Therapy      2. Strain of neck muscle, subsequent encounter  Follow Up In Physical Therapy          Subjective    Patient with nausea and pain a noted,.  Patient drinking more water, been doing HEP, ordered raquette balls.    Pain  Pain Assessment  Pain Assessment: 0-10  Pain Score: 8  Pain Type:  (headache pain primarily at back of head, betwwen shoulder blades 2/10.)    Objective     Treatments:    Add cervical rotation and SCM stretch   Check for BPPV    Manual therapy:  1/25/2024:  Cross fiber to Temporalis, pterygoids with patient education for self management.  MFR to posterior scalenes right>left and cross fiber to same muscles.    Prior:  MFR to middle scalenes, upper trap, right > left levator scap left>right rhomboids bilaterally right >left    Use of tennis/raquette ball in pillow case for trigger point self release    Therapeutic Ex  1/25/2024:  Taping to decreased activation of upper trap and levator scap as well as pectoralis muscles.    1/23/2024:  Door way rhomboid stretch 2 min  Door way pec stretch 2 min 3 positions  Supine chin tucks 10 times 1 set * hold 5 sec  Supine scapular retraction 10 times 1 set * hold 5 sec    PRIOR:  Use of towel roll for decreased cervical thoracic stress and strain  Use of heat 20 min or ice 10 min every hour for pain control  Meditation for relaxation, pain control and sleep  No naps during the day unless normal sleep at night  Walk for 30 minutes daily  Levator scap stretch 3 times 30\" * mod cues for pull " "to stretch not pain  Upper trap 3 times 30\" * mod cues for pull to stretch not pain    OP EDUCATION:       Goals:  Active       PT Problem       Patient decreased neck disability index score to 16 points for self reported decline in symptoms.        Start:  01/15/24    Expected End:  04/14/24            Patient will increase cervical rotation to 0-60 degrees to allow the patient to scan environment without pain.        Start:  01/15/24    Expected End:  04/14/24            Patient decrease DHI to 20 points for self reported improvement in function.        Start:  01/15/24    Expected End:  04/14/24            Patient report headaches at 0-4/10 to allow increased ease in daily tasks       Start:  01/15/24    Expected End:  04/14/24            Patient will be independent in Home Exercise Program to manage symptoms and maximize their functional independence.        Start:  01/15/24    Expected End:  04/14/24            Patient Goal: Pain releif       Start:  01/15/24    Expected End:  04/14/24              "

## 2024-01-28 NOTE — PROGRESS NOTES
"Physical Therapy    Physical Therapy Treatment    Patient Name: Rosibel Staton  MRN: [id number]  Today's Date: 1/29/2024  Time Calculation  Start Time: 0900  Stop Time: 0945  Time Calculation (min): 45 min      Assessment:     Patient with increased ability to decrease pain with stretching.    Plan:     Progress as able    Current Problem  1. Injury of head, subsequent encounter  Follow Up In Physical Therapy      2. Strain of neck muscle, subsequent encounter  Follow Up In Physical Therapy            Subjective    Patient states that she did not have time to stretch before coming to therapy this date.    Pain  Pain Assessment  Pain Assessment: 0-10  Pain Score:  (Patient states that she had a little headache 3/10 today, last 2 days very stressed due to soon to be x , neck 5/10, 6-7/10.)    Objective     Treatments:  Add cervical rotation and SCM stretch cervical stretching  Check for BPPV    Manual therapy:  1/29/2024:  Cross fiber and trigger point rhomboids, levator scap, SCM, upper trap and posterior scalenes    1/25/2024:  Cross fiber to Temporalis, pterygoids with patient education for self management.  MFR to posterior scalenes right>left and cross fiber to same muscles.    Prior:  MFR to middle scalenes, upper trap, right > left levator scap left>right rhomboids bilaterally right >left    Use of tennis/raquette ball in pillow case for trigger point self release    Therapeutic Ex  1/29/2024:  Door way pec stretch 3 position 3 times 30\" *   Rhomboid stretch in the door 3 times 30\" *   Sleeper stretch 3 times 30\" *     1/25/2024:  Taping to decreased activation of upper trap and levator scap as well as pectoralis muscles.    1/23/2024:  Door way rhomboid stretch 2 min  Door way pec stretch 2 min 3 positions  Supine chin tucks 10 times 1 set * hold 5 sec  Supine scapular retraction 10 times 1 set * hold 5 sec    PRIOR:  Use of towel roll for decreased cervical thoracic stress and strain  Use of heat 20 " "min or ice 10 min every hour for pain control  Meditation for relaxation, pain control and sleep  No naps during the day unless normal sleep at night  Walk for 30 minutes daily  Levator scap stretch 3 times 30\" * mod cues for pull to stretch not pain  Upper trap 3 times 30\" * mod cues for pull to stretch not pain      OP EDUCATION:       Goals:  Active       PT Problem       Patient decreased neck disability index score to 16 points for self reported decline in symptoms.        Start:  01/15/24    Expected End:  04/14/24            Patient will increase cervical rotation to 0-60 degrees to allow the patient to scan environment without pain.        Start:  01/15/24    Expected End:  04/14/24            Patient decrease DHI to 20 points for self reported improvement in function.        Start:  01/15/24    Expected End:  04/14/24            Patient report headaches at 0-4/10 to allow increased ease in daily tasks       Start:  01/15/24    Expected End:  04/14/24            Patient will be independent in Home Exercise Program to manage symptoms and maximize their functional independence.        Start:  01/15/24    Expected End:  04/14/24            Patient Goal: Pain releif       Start:  01/15/24    Expected End:  04/14/24              "

## 2024-01-29 ENCOUNTER — TREATMENT (OUTPATIENT)
Dept: PHYSICAL THERAPY | Facility: CLINIC | Age: 39
End: 2024-01-29
Payer: COMMERCIAL

## 2024-01-29 DIAGNOSIS — S16.1XXD STRAIN OF NECK MUSCLE, SUBSEQUENT ENCOUNTER: ICD-10-CM

## 2024-01-29 DIAGNOSIS — S09.90XD INJURY OF HEAD, SUBSEQUENT ENCOUNTER: ICD-10-CM

## 2024-01-29 PROCEDURE — 97140 MANUAL THERAPY 1/> REGIONS: CPT | Mod: GP | Performed by: PHYSICAL THERAPIST

## 2024-01-29 PROCEDURE — 97110 THERAPEUTIC EXERCISES: CPT | Mod: GP | Performed by: PHYSICAL THERAPIST

## 2024-01-29 ASSESSMENT — PAIN - FUNCTIONAL ASSESSMENT: PAIN_FUNCTIONAL_ASSESSMENT: 0-10

## 2024-01-31 NOTE — PROGRESS NOTES
"Physical Therapy    Physical Therapy Treatment    Patient Name: Rosibel Staton  MRN: [id number]  Today's Date: 2/1/2024         Assessment:     Patient with increased cervical rotation and decreased pain after manual therapy and well as only minimal Upper cervical rotation restriction.    Plan:   Teach self cervical mobiliazation    Current Problem  1. Injury of head, subsequent encounter  Follow Up In Physical Therapy      2. Strain of neck muscle, subsequent encounter  Follow Up In Physical Therapy        Subjective    Patient states that she had a vanilla protein shake and not chocolate and has no headache today and was able to eat pizza.  Patient states that her pain was 7/10 upon waking and decreased to 4/10 after stretching.    Pain  Pain Assessment  Pain Assessment: 0-10  Pain Score:  (4-5/10 right neck, no headache,)    Objective     Treatments:  2/1/2024:  Manual therapy   Upper cervical rotation limited to right severly  C1 rotation to the left, C2-C4 rotated to the right  Cross fiber and Trigger point to SCM, Anterior scalene, paraspinals, semi spinalis  Patient with mild cervical restriction to the right after manual therapy and improve cervical rotation.  As well as decreased pain.    1/29/2024:  Cross fiber and trigger point rhomboids, levator scap, SCM, upper trap and posterior scalenes    1/25/2024:  Cross fiber to Temporalis, pterygoids with patient education for self management.  MFR to posterior scalenes right>left and cross fiber to same muscles.    Prior:  MFR to middle scalenes, upper trap, right > left levator scap left>right rhomboids bilaterally right >left    Use of tennis/raquette ball in pillow case for trigger point self release    Therapeutic Ex  1/29/2024:  Door way pec stretch 3 position 3 times 30\" *   Rhomboid stretch in the door 3 times 30\" *   Sleeper stretch 3 times 30\" *     1/25/2024:  Taping to decreased activation of upper trap and levator scap as well as pectoralis " "muscles.    1/23/2024:  Door way rhomboid stretch 2 min  Door way pec stretch 2 min 3 positions  Supine chin tucks 10 times 1 set * hold 5 sec  Supine scapular retraction 10 times 1 set * hold 5 sec    PRIOR:  Use of towel roll for decreased cervical thoracic stress and strain  Use of heat 20 min or ice 10 min every hour for pain control  Meditation for relaxation, pain control and sleep  No naps during the day unless normal sleep at night  Walk for 30 minutes daily  Levator scap stretch 3 times 30\" * mod cues for pull to stretch not pain  Upper trap 3 times 30\" * mod cues for pull to stretch not pain  OP EDUCATION:     2/1/2024:  Check posture on pillow to see in head is in a neurtral position or tipped up/down.  And trial of different pillow heights.    Goals:  Active       PT Problem       Patient decreased neck disability index score to 16 points for self reported decline in symptoms.        Start:  01/15/24    Expected End:  04/14/24            Patient will increase cervical rotation to 0-60 degrees to allow the patient to scan environment without pain.        Start:  01/15/24    Expected End:  04/14/24            Patient decrease DHI to 20 points for self reported improvement in function.        Start:  01/15/24    Expected End:  04/14/24            Patient report headaches at 0-4/10 to allow increased ease in daily tasks       Start:  01/15/24    Expected End:  04/14/24            Patient will be independent in Home Exercise Program to manage symptoms and maximize their functional independence.        Start:  01/15/24    Expected End:  04/14/24            Patient Goal: Pain releif       Start:  01/15/24    Expected End:  04/14/24              "

## 2024-02-01 ENCOUNTER — TREATMENT (OUTPATIENT)
Dept: PHYSICAL THERAPY | Facility: CLINIC | Age: 39
End: 2024-02-01
Payer: COMMERCIAL

## 2024-02-01 DIAGNOSIS — S09.90XD INJURY OF HEAD, SUBSEQUENT ENCOUNTER: ICD-10-CM

## 2024-02-01 DIAGNOSIS — S16.1XXD STRAIN OF NECK MUSCLE, SUBSEQUENT ENCOUNTER: ICD-10-CM

## 2024-02-01 PROCEDURE — 97140 MANUAL THERAPY 1/> REGIONS: CPT | Mod: GP | Performed by: PHYSICAL THERAPIST

## 2024-02-01 ASSESSMENT — PAIN - FUNCTIONAL ASSESSMENT: PAIN_FUNCTIONAL_ASSESSMENT: 0-10

## 2024-02-05 ENCOUNTER — EVALUATION (OUTPATIENT)
Dept: SPEECH THERAPY | Facility: CLINIC | Age: 39
End: 2024-02-05
Payer: COMMERCIAL

## 2024-02-05 ENCOUNTER — TREATMENT (OUTPATIENT)
Dept: PHYSICAL THERAPY | Facility: CLINIC | Age: 39
End: 2024-02-05
Payer: COMMERCIAL

## 2024-02-05 DIAGNOSIS — R48.8 ANOMIA: Primary | ICD-10-CM

## 2024-02-05 DIAGNOSIS — S09.90XD INJURY OF HEAD, SUBSEQUENT ENCOUNTER: Primary | ICD-10-CM

## 2024-02-05 DIAGNOSIS — S16.1XXD STRAIN OF NECK MUSCLE, SUBSEQUENT ENCOUNTER: ICD-10-CM

## 2024-02-05 PROCEDURE — 97110 THERAPEUTIC EXERCISES: CPT | Mod: GP | Performed by: PHYSICAL THERAPIST

## 2024-02-05 PROCEDURE — 92523 SPEECH SOUND LANG COMPREHEN: CPT | Mod: GN

## 2024-02-05 PROCEDURE — 97140 MANUAL THERAPY 1/> REGIONS: CPT | Mod: GP | Performed by: PHYSICAL THERAPIST

## 2024-02-05 ASSESSMENT — PAIN - FUNCTIONAL ASSESSMENT
PAIN_FUNCTIONAL_ASSESSMENT: 0-10
PAIN_FUNCTIONAL_ASSESSMENT: 0-10

## 2024-02-05 ASSESSMENT — ENCOUNTER SYMPTOMS
OCCASIONAL FEELINGS OF UNSTEADINESS: 0
LOSS OF SENSATION IN FEET: 0
DEPRESSION: 1

## 2024-02-05 ASSESSMENT — PAIN SCALES - GENERAL: PAINLEVEL_OUTOF10: 5 - MODERATE PAIN

## 2024-02-05 NOTE — PROGRESS NOTES
"Physical Therapy    Physical Therapy Treatment    Patient Name: Rosibel Staton  MRN: [id number]  Today's Date: 2/6/2024         Assessment:     Patient with decreasing pain, increasing ROM as well as increasing functional status.  Plan:     Start scapular strenghtening    Current Problem  1. Injury of head, subsequent encounter        2. Strain of neck muscle, subsequent encounter          Subjective    Patient states that her stress level is high due to going through a divorce.      Pain       Objective     Treatments:    2/5/2024:  Upper cervical rotation self  Lower cervical rotation self    Manual therapy  Cross fiber, trigger point levator scap, upper trap and Middle scalenes    2/1/2024:  Manual therapy   Upper cervical rotation limited to right severly  C1 rotation to the left, C2-C4 rotated to the right  Cross fiber and Trigger point to SCM, Anterior scalene, paraspinals, semi spinalis  Patient with mild cervical restriction to the right after manual therapy and improve cervical rotation.  As well as decreased pain.    1/29/2024:  Cross fiber and trigger point rhomboids, levator scap, SCM, upper trap and posterior scalenes    1/25/2024:  Cross fiber to Temporalis, pterygoids with patient education for self management.  MFR to posterior scalenes right>left and cross fiber to same muscles.    Prior:  MFR to middle scalenes, upper trap, right > left levator scap left>right rhomboids bilaterally right >left    Use of tennis/raquette ball in pillow case for trigger point self release    Therapeutic Ex  1/29/2024:  Door way pec stretch 3 position 3 times 30\" *   Rhomboid stretch in the door 3 times 30\" *   Sleeper stretch 3 times 30\" *     1/25/2024:  Taping to decreased activation of upper trap and levator scap as well as pectoralis muscles.    1/23/2024:  Door way rhomboid stretch 2 min  Door way pec stretch 2 min 3 positions  Supine chin tucks 10 times 1 set * hold 5 sec  Supine scapular retraction 10 times 1 " "set * hold 5 sec    PRIOR:  Use of towel roll for decreased cervical thoracic stress and strain  Use of heat 20 min or ice 10 min every hour for pain control  Meditation for relaxation, pain control and sleep  No naps during the day unless normal sleep at night  Walk for 30 minutes daily  Levator scap stretch 3 times 30\" * mod cues for pull to stretch not pain  Upper trap 3 times 30\" * mod cues for pull to stretch not pain  OP EDUCATION:     2/1/2024:  Check posture on pillow to see in head is in a neurtral position or tipped up/down.  And trial of different pillow heights.  OP EDUCATION:       Goals:  Active       PT Problem       Patient decreased neck disability index score to 16 points for self reported decline in symptoms.        Start:  01/15/24    Expected End:  04/14/24            Patient will increase cervical rotation to 0-60 degrees to allow the patient to scan environment without pain.        Start:  01/15/24    Expected End:  04/14/24            Patient decrease DHI to 20 points for self reported improvement in function.        Start:  01/15/24    Expected End:  04/14/24            Patient report headaches at 0-4/10 to allow increased ease in daily tasks       Start:  01/15/24    Expected End:  04/14/24            Patient will be independent in Home Exercise Program to manage symptoms and maximize their functional independence.        Start:  01/15/24    Expected End:  04/14/24            Patient Goal: Pain releif       Start:  01/15/24    Expected End:  04/14/24              "

## 2024-02-08 ENCOUNTER — TREATMENT (OUTPATIENT)
Dept: PHYSICAL THERAPY | Facility: CLINIC | Age: 39
End: 2024-02-08
Payer: COMMERCIAL

## 2024-02-08 DIAGNOSIS — S16.1XXD STRAIN OF NECK MUSCLE, SUBSEQUENT ENCOUNTER: ICD-10-CM

## 2024-02-08 DIAGNOSIS — S09.90XD INJURY OF HEAD, SUBSEQUENT ENCOUNTER: Primary | ICD-10-CM

## 2024-02-08 PROCEDURE — 97110 THERAPEUTIC EXERCISES: CPT | Mod: GP | Performed by: PHYSICAL THERAPIST

## 2024-02-08 PROCEDURE — 97140 MANUAL THERAPY 1/> REGIONS: CPT | Mod: GP | Performed by: PHYSICAL THERAPIST

## 2024-02-08 ASSESSMENT — PAIN SCALES - GENERAL: PAINLEVEL_OUTOF10: 4

## 2024-02-08 ASSESSMENT — PAIN - FUNCTIONAL ASSESSMENT: PAIN_FUNCTIONAL_ASSESSMENT: 0-10

## 2024-02-08 NOTE — PROGRESS NOTES
Physical Therapy    Physical Therapy Treatment    Patient Name: Rosibel Staton  MRN: [id number]  Today's Date: 2/8/2024  Time Calculation  Start Time: 0945  Stop Time: 1030  Time Calculation (min): 45 min      Assessment:   Patient with good tolerance of strengtheniing exercises and good technique.    Plan:   Add high rows    Current Problem  1. Injury of head, subsequent encounter        2. Strain of neck muscle, subsequent encounter            General     General  General Comment: visit 8    Subjective   Patient states that she is running late.  Patient did not stretch prior to therapy.     Pain  Pain Assessment  Pain Assessment: 0-10  Pain Score: 4 (right side of the neck)    Objective     Treatments:    2/8/2024:  Shoulder ext green 10 times 1 set *  Rows 10 times 1 set *   Cervical retraction supine and sitting 10 times 1 set *     Manual therapy:  Cross fiber semi spinals, and upper trap right side    2/5/2024:  Upper cervical rotation self  Lower cervical rotation self    Manual therapy  Cross fiber, trigger point levator scap, upper trap and Middle scalenes    2/1/2024:  Manual therapy   Upper cervical rotation limited to right severly  C1 rotation to the left, C2-C4 rotated to the right  Cross fiber and Trigger point to SCM, Anterior scalene, paraspinals, semi spinalis  Patient with mild cervical restriction to the right after manual therapy and improve cervical rotation.  As well as decreased pain.    1/29/2024:  Cross fiber and trigger point rhomboids, levator scap, SCM, upper trap and posterior scalenes    1/25/2024:  Cross fiber to Temporalis, pterygoids with patient education for self management.  MFR to posterior scalenes right>left and cross fiber to same muscles.    Prior:  MFR to middle scalenes, upper trap, right > left levator scap left>right rhomboids bilaterally right >left    Use of tennis/raquette ball in pillow case for trigger point self release    Therapeutic Ex  1/29/2024:  Door way pec  "stretch 3 position 3 times 30\" *   Rhomboid stretch in the door 3 times 30\" *   Sleeper stretch 3 times 30\" *     1/25/2024:  Taping to decreased activation of upper trap and levator scap as well as pectoralis muscles.    1/23/2024:  Door way rhomboid stretch 2 min  Door way pec stretch 2 min 3 positions  Supine chin tucks 10 times 1 set * hold 5 sec  Supine scapular retraction 10 times 1 set * hold 5 sec    PRIOR:  Use of towel roll for decreased cervical thoracic stress and strain  Use of heat 20 min or ice 10 min every hour for pain control  Meditation for relaxation, pain control and sleep  No naps during the day unless normal sleep at night  Walk for 30 minutes daily  Levator scap stretch 3 times 30\" * mod cues for pull to stretch not pain  Upper trap 3 times 30\" * mod cues for pull to stretch not pain  OP EDUCATION:     2/1/2024:  Check posture on pillow to see in head is in a neurtral position or tipped up/down.  And trial of different pillow heights.  OP EDUCATION:       Goals:  Active       PT Problem       Patient decreased neck disability index score to 16 points for self reported decline in symptoms.        Start:  01/15/24    Expected End:  04/14/24            Patient will increase cervical rotation to 0-60 degrees to allow the patient to scan environment without pain.        Start:  01/15/24    Expected End:  04/14/24            Patient decrease DHI to 20 points for self reported improvement in function.        Start:  01/15/24    Expected End:  04/14/24            Patient report headaches at 0-4/10 to allow increased ease in daily tasks       Start:  01/15/24    Expected End:  04/14/24            Patient will be independent in Home Exercise Program to manage symptoms and maximize their functional independence.        Start:  01/15/24    Expected End:  04/14/24            Patient Goal: Pain releif       Start:  01/15/24    Expected End:  04/14/24              "

## 2024-02-09 NOTE — PROGRESS NOTES
Physical Therapy    Physical Therapy Treatment    Patient Name: Rosibel Staton  MRN: [id number]  Today's Date: 2/12/2024         Assessment:     Patient with decreased tolerance of activity on the treadmill rating work at hard but able to carry on a conversation without difficulty.  Patient with good tolerance of activity and with better understanding of need to return to normal activity.    Plan:     REASSESSMENT    Current Problem  1. Injury of head, subsequent encounter        2. Strain of neck muscle, subsequent encounter            General     General  General Comment: visit 9    Subjective    Patient reports that she is walking for 30 minutes daily, but only getting 1211-3211 steps a day.    Precautions     Vital Signs   HR on treadmill at 2.5 MPH with no incline,  BPM, patient with lower HR at end on ambulation than initially.  Pain  Pain Assessment  Pain Assessment: 0-10  Pain Score: 4 (central neck and right shoulder, verbal description achy and tight)    Objective     Treatments:    2/12/2024:  Manual therapy: 30  Treadmill 2.5 MPH for 15 min with about 3000 steps.  Patient encouraged to return to normal activity, not stay in garage with low light  Cervical lateral flexion 1 time  Levator scap stretch 1 time with reported decreased pain    Manual therapy 10  Right side to upper trap and levator scap    2/8/2024:  Shoulder ext green 10 times 1 set *  Rows 10 times 1 set *   Cervical retraction supine and sitting 10 times 1 set *     Manual therapy:  Cross fiber semi spinals, and upper trap right side    2/5/2024:  Upper cervical rotation self  Lower cervical rotation self    Manual therapy  Cross fiber, trigger point levator scap, upper trap and Middle scalenes    2/1/2024:  Manual therapy   Upper cervical rotation limited to right severly  C1 rotation to the left, C2-C4 rotated to the right  Cross fiber and Trigger point to SCM, Anterior scalene, paraspinals, semi spinalis  Patient with mild  "cervical restriction to the right after manual therapy and improve cervical rotation.  As well as decreased pain.    1/29/2024:  Cross fiber and trigger point rhomboids, levator scap, SCM, upper trap and posterior scalenes    1/25/2024:  Cross fiber to Temporalis, pterygoids with patient education for self management.  MFR to posterior scalenes right>left and cross fiber to same muscles.    Prior:  MFR to middle scalenes, upper trap, right > left levator scap left>right rhomboids bilaterally right >left    Use of tennis/raquette ball in pillow case for trigger point self release    Therapeutic Ex  1/29/2024:  Door way pec stretch 3 position 3 times 30\" *   Rhomboid stretch in the door 3 times 30\" *   Sleeper stretch 3 times 30\" *     1/25/2024:  Taping to decreased activation of upper trap and levator scap as well as pectoralis muscles.    1/23/2024:  Door way rhomboid stretch 2 min  Door way pec stretch 2 min 3 positions  Supine chin tucks 10 times 1 set * hold 5 sec  Supine scapular retraction 10 times 1 set * hold 5 sec    PRIOR:  Use of towel roll for decreased cervical thoracic stress and strain  Use of heat 20 min or ice 10 min every hour for pain control  Meditation for relaxation, pain control and sleep  No naps during the day unless normal sleep at night  Walk for 30 minutes daily  Levator scap stretch 3 times 30\" * mod cues for pull to stretch not pain  Upper trap 3 times 30\" * mod cues for pull to stretch not pain  OP EDUCATION:       Goals:  Active       PT Problem       Patient decreased neck disability index score to 16 points for self reported decline in symptoms.        Start:  01/15/24    Expected End:  04/14/24            Patient will increase cervical rotation to 0-60 degrees to allow the patient to scan environment without pain.        Start:  01/15/24    Expected End:  04/14/24            Patient decrease DHI to 20 points for self reported improvement in function.        Start:  01/15/24    " Expected End:  04/14/24            Patient report headaches at 0-4/10 to allow increased ease in daily tasks       Start:  01/15/24    Expected End:  04/14/24            Patient will be independent in Home Exercise Program to manage symptoms and maximize their functional independence.        Start:  01/15/24    Expected End:  04/14/24            Patient Goal: Pain releif       Start:  01/15/24    Expected End:  04/14/24

## 2024-02-12 ENCOUNTER — TREATMENT (OUTPATIENT)
Dept: PHYSICAL THERAPY | Facility: CLINIC | Age: 39
End: 2024-02-12
Payer: COMMERCIAL

## 2024-02-12 ENCOUNTER — OFFICE VISIT (OUTPATIENT)
Dept: NEUROLOGY | Facility: CLINIC | Age: 39
End: 2024-02-12
Payer: COMMERCIAL

## 2024-02-12 VITALS
SYSTOLIC BLOOD PRESSURE: 134 MMHG | WEIGHT: 137 LBS | DIASTOLIC BLOOD PRESSURE: 86 MMHG | BODY MASS INDEX: 22.02 KG/M2 | HEART RATE: 90 BPM | HEIGHT: 66 IN

## 2024-02-12 DIAGNOSIS — S16.1XXD STRAIN OF NECK MUSCLE, SUBSEQUENT ENCOUNTER: ICD-10-CM

## 2024-02-12 DIAGNOSIS — G43.009 MIGRAINE WITHOUT AURA AND WITHOUT STATUS MIGRAINOSUS, NOT INTRACTABLE: Primary | ICD-10-CM

## 2024-02-12 DIAGNOSIS — S09.90XD INJURY OF HEAD, SUBSEQUENT ENCOUNTER: Primary | ICD-10-CM

## 2024-02-12 PROCEDURE — 97110 THERAPEUTIC EXERCISES: CPT | Mod: GP | Performed by: PHYSICAL THERAPIST

## 2024-02-12 PROCEDURE — 97140 MANUAL THERAPY 1/> REGIONS: CPT | Mod: GP | Performed by: PHYSICAL THERAPIST

## 2024-02-12 PROCEDURE — 99204 OFFICE O/P NEW MOD 45 MIN: CPT | Performed by: PSYCHIATRY & NEUROLOGY

## 2024-02-12 RX ORDER — RIZATRIPTAN BENZOATE 10 MG/1
10 TABLET, ORALLY DISINTEGRATING ORAL ONCE AS NEEDED
Qty: 9 TABLET | Refills: 2 | Status: SHIPPED | OUTPATIENT
Start: 2024-02-12 | End: 2024-05-14 | Stop reason: WASHOUT

## 2024-02-12 ASSESSMENT — PAIN SCALES - GENERAL: PAINLEVEL_OUTOF10: 4

## 2024-02-12 ASSESSMENT — PAIN - FUNCTIONAL ASSESSMENT: PAIN_FUNCTIONAL_ASSESSMENT: 0-10
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
## 2024-02-12 NOTE — PATIENT INSTRUCTIONS
You have common migraines and need adjustments in the medications.  Start the propranolol 20 mg three times a day  And try the rizatriptan 10 mg (Maxalt- MLT) and make sure you take it as soon as you notice a migraine, and can take a 2nd dose after 2 hours if needed.  Call our office if you have any adverse effects.  Consider the Ubrelvy if needed in the future.  Follow up in 2 months- suggest Halima Garg PA-C.  
repositioning/rest
None

## 2024-02-15 ENCOUNTER — TREATMENT (OUTPATIENT)
Dept: PHYSICAL THERAPY | Facility: CLINIC | Age: 39
End: 2024-02-15
Payer: COMMERCIAL

## 2024-02-15 DIAGNOSIS — S09.90XD INJURY OF HEAD, SUBSEQUENT ENCOUNTER: Primary | ICD-10-CM

## 2024-02-15 DIAGNOSIS — S16.1XXD STRAIN OF NECK MUSCLE, SUBSEQUENT ENCOUNTER: ICD-10-CM

## 2024-02-15 PROCEDURE — 97110 THERAPEUTIC EXERCISES: CPT | Mod: GP | Performed by: PHYSICAL THERAPIST

## 2024-02-15 PROCEDURE — 97140 MANUAL THERAPY 1/> REGIONS: CPT | Mod: GP | Performed by: PHYSICAL THERAPIST

## 2024-02-15 ASSESSMENT — PAIN - FUNCTIONAL ASSESSMENT: PAIN_FUNCTIONAL_ASSESSMENT: 0-10

## 2024-02-15 NOTE — PROGRESS NOTES
Physical Therapy    Physical Therapy Treatment    Patient Name: Rosibel Staton  MRN: [id number]  Today's Date: 2/19/2024         Assessment:  Patient is making steady gains at this time.  Patient continues to have decreased tolerance of light, pain, headaches at increased frequency than prior to her concussion.  Patient states that she feels that she is ready to return to work.  Patient can benefit from further therapy to address her deficits and maximize her functional outcome.    Plan:   Continue to work to normalize symptoms and return to work    Current Problem  1. Injury of head, subsequent encounter        2. Strain of neck muscle, subsequent encounter            Subjective    Patient states that she has had a medication change and she is feeling better.  Patient states that she has been walking and doing water.  Headache pain 4/10, which stretching decrease    Pain   4/10 neck pain    Objective     Outcome Measures:  NDI   Treatments:    2/15/2024:  Therapeutic ex:  Patient cervical stretching with encouragement to stretch as needed to decrease pain and stretch before pain is above 5/10.    Manual therapy  Cross fiber and trigger point to semi spinalis, posterior scalenes.  Patient review upper cervical rotation    2/12/2024:  Manual therapy: 30  Treadmill 2.5 MPH for 15 min with about 3000 steps.  Patient encouraged to return to normal activity, not stay in garage with low light  Cervical lateral flexion 1 time  Levator scap stretch 1 time with reported decreased pain    Manual therapy 10  Right side to upper trap and levator scap    2/8/2024:  Shoulder ext green 10 times 1 set *  Rows 10 times 1 set *   Cervical retraction supine and sitting 10 times 1 set *     Manual therapy:  Cross fiber semi spinals, and upper trap right side    2/5/2024:  Upper cervical rotation self  Lower cervical rotation self    Manual therapy  Cross fiber, trigger point levator scap, upper trap and Middle  "scalenes    2/1/2024:  Manual therapy   Upper cervical rotation limited to right severly  C1 rotation to the left, C2-C4 rotated to the right  Cross fiber and Trigger point to SCM, Anterior scalene, paraspinals, semi spinalis  Patient with mild cervical restriction to the right after manual therapy and improve cervical rotation.  As well as decreased pain.    1/29/2024:  Cross fiber and trigger point rhomboids, levator scap, SCM, upper trap and posterior scalenes    1/25/2024:  Cross fiber to Temporalis, pterygoids with patient education for self management.  MFR to posterior scalenes right>left and cross fiber to same muscles.    Prior:  MFR to middle scalenes, upper trap, right > left levator scap left>right rhomboids bilaterally right >left    Use of tennis/raquette ball in pillow case for trigger point self release    Therapeutic Ex  1/29/2024:  Door way pec stretch 3 position 3 times 30\" *   Rhomboid stretch in the door 3 times 30\" *   Sleeper stretch 3 times 30\" *     1/25/2024:  Taping to decreased activation of upper trap and levator scap as well as pectoralis muscles.    1/23/2024:  Door way rhomboid stretch 2 min  Door way pec stretch 2 min 3 positions  Supine chin tucks 10 times 1 set * hold 5 sec  Supine scapular retraction 10 times 1 set * hold 5 sec    PRIOR:  Use of towel roll for decreased cervical thoracic stress and strain  Use of heat 20 min or ice 10 min every hour for pain control  Meditation for relaxation, pain control and sleep  No naps during the day unless normal sleep at night  Walk for 30 minutes daily  Levator scap stretch 3 times 30\" * mod cues for pull to stretch not pain  Upper trap 3 times 30\" * mod cues for pull to stretch not pain  OP EDUCATION:       Goals:  Active       PT Problem       Patient decreased neck disability index score to 16 points for self reported decline in symptoms.        Start:  01/15/24    Expected End:  04/14/24            Patient will increase cervical " rotation to 0-60 degrees to allow the patient to scan environment without pain.  (Progressing)       Start:  01/15/24    Expected End:  04/14/24         Goal Note       Right 0-54 left 0-58              Patient decrease DHI to 20 points for self reported improvement in function.        Start:  01/15/24    Expected End:  04/14/24            Patient report headaches at 0-4/10 to allow increased ease in daily tasks (Progressing)       Start:  01/15/24    Expected End:  04/14/24         Goal Note       Headaches 1-8/10, 2 migraines in the last week, with new meds this week to address.  Patient reports 40% of normal for headache.              Patient will be independent in Home Exercise Program to manage symptoms and maximize their functional independence.  (Progressing)       Start:  01/15/24    Expected End:  04/14/24            Patient Goal: Pain releif       Start:  01/15/24    Expected End:  04/14/24

## 2024-02-19 ENCOUNTER — TREATMENT (OUTPATIENT)
Dept: PHYSICAL THERAPY | Facility: CLINIC | Age: 39
End: 2024-02-19
Payer: COMMERCIAL

## 2024-02-19 DIAGNOSIS — S09.90XD INJURY OF HEAD, SUBSEQUENT ENCOUNTER: Primary | ICD-10-CM

## 2024-02-19 DIAGNOSIS — S16.1XXD STRAIN OF NECK MUSCLE, SUBSEQUENT ENCOUNTER: ICD-10-CM

## 2024-02-19 PROCEDURE — 97140 MANUAL THERAPY 1/> REGIONS: CPT | Mod: GP | Performed by: PHYSICAL THERAPIST

## 2024-02-19 PROCEDURE — 97110 THERAPEUTIC EXERCISES: CPT | Mod: GP | Performed by: PHYSICAL THERAPIST

## 2024-02-19 ASSESSMENT — PAIN - FUNCTIONAL ASSESSMENT: PAIN_FUNCTIONAL_ASSESSMENT: 0-10

## 2024-02-19 NOTE — PROGRESS NOTES
Physical Therapy    Physical Therapy Treatment    Patient Name: Rosibel Staton  MRN: [id number]  Today's Date: 2/19/2024         Assessment:     Patient with continued cervical tone.  Patient again educated on need to stretch to keep pain at a tolerable level and not to allow the pain to get above a 5/10.  Plan:   Continue to work decreasing cervical tone    Current Problem  1. Injury of head, subsequent encounter        2. Strain of neck muscle, subsequent encounter          Subjective    Patient states that she has a really bad headache at base of head and into the neck, states that she stretched, heat and OTC pain meds.  Patient states that she had been trying to walk and had to use stairs when walking in the house.    Pain   Headache 5/10    Objective     Treatments:  2/19/2024:  Therapeutic ex: 25  Patient performed cervical stretches with decreased pain after stretching.  Positional testing negative for BPPV  Manual therpy:  Cross fiber and occupital release to posterior scalenes and semi spinalis with decreased tone    Prior visit:  Manual therapy: 30  Treadmill 2.5 MPH for 15 min with about 3000 steps.  Patient encouraged to return to normal activity, not stay in garage with low light  Cervical lateral flexion 1 time  Levator scap stretch 1 time with reported decreased pain    Manual therapy 10  Right side to upper trap and levator scap    2/8/2024:  Shoulder ext green 10 times 1 set *  Rows 10 times 1 set *   Cervical retraction supine and sitting 10 times 1 set *     Manual therapy:  Cross fiber semi spinals, and upper trap right side    2/5/2024:  Upper cervical rotation self  Lower cervical rotation self    Manual therapy  Cross fiber, trigger point levator scap, upper trap and Middle scalenes    2/1/2024:  Manual therapy   Upper cervical rotation limited to right severly  C1 rotation to the left, C2-C4 rotated to the right  Cross fiber and Trigger point to SCM, Anterior scalene, paraspinals, semi  "spinalis  Patient with mild cervical restriction to the right after manual therapy and improve cervical rotation.  As well as decreased pain.    1/29/2024:  Cross fiber and trigger point rhomboids, levator scap, SCM, upper trap and posterior scalenes    1/25/2024:  Cross fiber to Temporalis, pterygoids with patient education for self management.  MFR to posterior scalenes right>left and cross fiber to same muscles.    Prior:  MFR to middle scalenes, upper trap, right > left levator scap left>right rhomboids bilaterally right >left    Use of tennis/raquette ball in pillow case for trigger point self release    Therapeutic Ex  1/29/2024:  Door way pec stretch 3 position 3 times 30\" *   Rhomboid stretch in the door 3 times 30\" *   Sleeper stretch 3 times 30\" *     1/25/2024:  Taping to decreased activation of upper trap and levator scap as well as pectoralis muscles.    1/23/2024:  Door way rhomboid stretch 2 min  Door way pec stretch 2 min 3 positions  Supine chin tucks 10 times 1 set * hold 5 sec  Supine scapular retraction 10 times 1 set * hold 5 sec    PRIOR:  Use of towel roll for decreased cervical thoracic stress and strain  Use of heat 20 min or ice 10 min every hour for pain control  Meditation for relaxation, pain control and sleep  No naps during the day unless normal sleep at night  Walk for 30 minutes daily  Levator scap stretch 3 times 30\" * mod cues for pull to stretch not pain  Upper trap 3 times 30\" * mod cues for pull to stretch not pain  OP EDUCATION:       Goals:  Active       PT Problem       Patient decreased neck disability index score to 16 points for self reported decline in symptoms.        Start:  01/15/24    Expected End:  04/14/24            Patient will increase cervical rotation to 0-60 degrees to allow the patient to scan environment without pain.  (Progressing)       Start:  01/15/24    Expected End:  04/14/24         Goal Note       Right 0-54 left 0-58              Patient decrease " DHI to 20 points for self reported improvement in function.        Start:  01/15/24    Expected End:  04/14/24            Patient report headaches at 0-4/10 to allow increased ease in daily tasks (Progressing)       Start:  01/15/24    Expected End:  04/14/24         Goal Note       Headaches 1-8/10, 2 migraines in the last week, with new meds this week to address.  Patient reports 40% of normal for headache.              Patient will be independent in Home Exercise Program to manage symptoms and maximize their functional independence.  (Progressing)       Start:  01/15/24    Expected End:  04/14/24            Patient Goal: Pain releif       Start:  01/15/24    Expected End:  04/14/24

## 2024-02-22 ENCOUNTER — TREATMENT (OUTPATIENT)
Dept: PHYSICAL THERAPY | Facility: CLINIC | Age: 39
End: 2024-02-22
Payer: COMMERCIAL

## 2024-02-22 DIAGNOSIS — S09.90XD INJURY OF HEAD, SUBSEQUENT ENCOUNTER: Primary | ICD-10-CM

## 2024-02-22 DIAGNOSIS — S16.1XXD STRAIN OF NECK MUSCLE, SUBSEQUENT ENCOUNTER: ICD-10-CM

## 2024-02-22 PROCEDURE — 97140 MANUAL THERAPY 1/> REGIONS: CPT | Mod: GP | Performed by: PHYSICAL THERAPIST

## 2024-02-22 PROCEDURE — 97110 THERAPEUTIC EXERCISES: CPT | Mod: GP | Performed by: PHYSICAL THERAPIST

## 2024-02-22 ASSESSMENT — PAIN - FUNCTIONAL ASSESSMENT: PAIN_FUNCTIONAL_ASSESSMENT: 0-10

## 2024-02-22 NOTE — PROGRESS NOTES
Physical Therapy    Physical Therapy Treatment    Patient Name: Rosibel Staton  MRN: [id number]  Today's Date: 2/22/2024         Assessment:     Patient with continued decreased pain with stretching but continues to wait until pain is bad before trying to manage with extra stretching, heat or OTC pain killers.    Plan:     Continue to work to have patient manage tone independently    Current Problem  1. Injury of head, subsequent encounter        2. Strain of neck muscle, subsequent encounter            General     General  General Comment: visit 12    Subjective    Patient states that will get bad at night.  Back or side sleeper pillow between knees.  Sleep with My pillow.    Pain  Pain Assessment  Pain Assessment: 0-10  Pain Score:  (5/10 posterior head)    Objective     Treatments:    2/22/2024:  Therapeutic ex:  Cervical stretches with decreased pain,   Manual therapy   Cross fiber semi spinalis, upper trap, levator scap, posterior scalenes, recutus capitis    2/20/2024:  Manual therapy: 30  Treadmill 2.5 MPH for 15 min with about 3000 steps.  Patient encouraged to return to normal activity, not stay in garage with low light  Cervical lateral flexion 1 time  Levator scap stretch 1 time with reported decreased pain    Manual therapy 10  Right side to upper trap and levator scap    2/8/2024:  Shoulder ext green 10 times 1 set *  Rows 10 times 1 set *   Cervical retraction supine and sitting 10 times 1 set *     Manual therapy:  Cross fiber semi spinals, and upper trap right side    2/5/2024:  Upper cervical rotation self  Lower cervical rotation self    Manual therapy  Cross fiber, trigger point levator scap, upper trap and Middle scalenes    2/1/2024:  Manual therapy   Upper cervical rotation limited to right severly  C1 rotation to the left, C2-C4 rotated to the right  Cross fiber and Trigger point to SCM, Anterior scalene, paraspinals, semi spinalis  Patient with mild cervical restriction to the right after  "manual therapy and improve cervical rotation.  As well as decreased pain.    1/29/2024:  Cross fiber and trigger point rhomboids, levator scap, SCM, upper trap and posterior scalenes    1/25/2024:  Cross fiber to Temporalis, pterygoids with patient education for self management.  MFR to posterior scalenes right>left and cross fiber to same muscles.    Prior:  MFR to middle scalenes, upper trap, right > left levator scap left>right rhomboids bilaterally right >left    Use of tennis/raquette ball in pillow case for trigger point self release    Therapeutic Ex  1/29/2024:  Door way pec stretch 3 position 3 times 30\" *   Rhomboid stretch in the door 3 times 30\" *   Sleeper stretch 3 times 30\" *     1/25/2024:  Taping to decreased activation of upper trap and levator scap as well as pectoralis muscles.    1/23/2024:  Door way rhomboid stretch 2 min  Door way pec stretch 2 min 3 positions  Supine chin tucks 10 times 1 set * hold 5 sec  Supine scapular retraction 10 times 1 set * hold 5 sec    PRIOR:  Use of towel roll for decreased cervical thoracic stress and strain  Use of heat 20 min or ice 10 min every hour for pain control  Meditation for relaxation, pain control and sleep  No naps during the day unless normal sleep at night  Walk for 30 minutes daily  Levator scap stretch 3 times 30\" * mod cues for pull to stretch not pain  Upper trap 3 times 30\" * mod cues for pull to stretch not pain  OP EDUCATION:       Goals:  Active       PT Problem       Patient decreased neck disability index score to 16 points for self reported decline in symptoms.        Start:  01/15/24    Expected End:  04/14/24            Patient will increase cervical rotation to 0-60 degrees to allow the patient to scan environment without pain.  (Progressing)       Start:  01/15/24    Expected End:  04/14/24         Goal Note       Right 0-54 left 0-58              Patient decrease DHI to 20 points for self reported improvement in function.        " Start:  01/15/24    Expected End:  04/14/24            Patient report headaches at 0-4/10 to allow increased ease in daily tasks (Progressing)       Start:  01/15/24    Expected End:  04/14/24         Goal Note       Headaches 1-8/10, 2 migraines in the last week, with new meds this week to address.  Patient reports 40% of normal for headache.              Patient will be independent in Home Exercise Program to manage symptoms and maximize their functional independence.  (Progressing)       Start:  01/15/24    Expected End:  04/14/24            Patient Goal: Pain releif       Start:  01/15/24    Expected End:  04/14/24

## 2024-02-26 ENCOUNTER — APPOINTMENT (OUTPATIENT)
Dept: PHYSICAL THERAPY | Facility: CLINIC | Age: 39
End: 2024-02-26
Payer: COMMERCIAL

## 2024-02-26 ENCOUNTER — TELEMEDICINE (OUTPATIENT)
Dept: PRIMARY CARE | Facility: CLINIC | Age: 39
End: 2024-02-26
Payer: MEDICARE

## 2024-02-26 DIAGNOSIS — R05.1 ACUTE COUGH: ICD-10-CM

## 2024-02-26 DIAGNOSIS — J06.9 ACUTE URI: Primary | ICD-10-CM

## 2024-02-26 DIAGNOSIS — R09.81 SINUS CONGESTION: ICD-10-CM

## 2024-02-26 LAB — RSV RNA RESP QL NAA+PROBE: NOT DETECTED

## 2024-02-26 PROCEDURE — 99214 OFFICE O/P EST MOD 30 MIN: CPT | Performed by: STUDENT IN AN ORGANIZED HEALTH CARE EDUCATION/TRAINING PROGRAM

## 2024-02-26 PROCEDURE — 87637 SARSCOV2&INF A&B&RSV AMP PRB: CPT

## 2024-02-26 NOTE — PROGRESS NOTES
Subjective   Patient ID: Rosibel Staton is a 38 y.o. female who presents for URI.    HPI   Upper Respiratory Infection  PinPoint the office today via a telemedicine visit to discuss worsening signs/symptoms of an upper respiratory infection    Over the past 24-48 hours she has been noticing increased sinus congestion that is yellow/green in color as well as having a slightly productive cough    Denies any fevers or chills but overall feels ill but denies any difficulty breathing or any other acute signs/symptoms    Denies any sick contacts but stated she did take her father to get a colonoscopy on Friday of this past week and now wondering if she was in contact with someone there    Did not test yourself for COVID-19 and willing to discuss testing at this time    Continue to take Tylenol/ibuprofen but overall is not feeling great    Patient discuss this further at today's visit      Review of Systems  All pertinent positive symptoms are included in the history of present illness.    All other systems have been reviewed and are negative and noncontributory to this patient's current ailments.    Objective   There were no vitals taken for this visit.    Physical Exam  CONSTITUTIONAL - well nourished, well developed, looks like stated age, in no acute distress, appears slightly fatigued and ill, sounds congested  SKIN - normal skin color and pigmentation, normal skin turgor without rash, lesions, or nodules visualized  HEAD - no trauma, normocephalic  EYES - normal external exam  CHEST -no distressed breathing, good effort  EXTREMITIES - no edema, no deformities  NEUROLOGICAL - normal balance, normal motor, no ataxia  PSYCHIATRIC - alert, pleasant and cordial, age-appropriate    Assessment/Plan   URI  Due to all of your sign/symptoms now only lasting under 2 days I truly feel that this is viral in nature    To be fully thorough I will test you for RSV, COVID-19, and influenza A/B    This testing should be back within  the next 24 hours and we will make recommendations accordingly    I encourage all patients with any concerns for potential exposure or transmission to remain at home strictly and adhere to social distancing practices that have been widely recommended by the CDC.    I encouraged supportive care such as rest, fluids and Advil/Tylenol as warranted  Return to the clinic in 3-5 days or sooner if symptoms persist as we will then further evaluate    At that time, we will discuss antibiotic therapy if all the testing above is negative    If emergency warning signs/symptoms occur such as trouble breathing, persistent pain or pressure in the chest, new confusion, inability to wake or stay awake, or bluish lips or face, you need to seek emergency medical care immediately.

## 2024-02-27 DIAGNOSIS — J06.9 ACUTE URI: ICD-10-CM

## 2024-02-27 DIAGNOSIS — R09.81 SINUS CONGESTION: ICD-10-CM

## 2024-02-27 DIAGNOSIS — R05.1 ACUTE COUGH: ICD-10-CM

## 2024-02-27 LAB
FLUAV RNA RESP QL NAA+PROBE: NOT DETECTED
FLUBV RNA RESP QL NAA+PROBE: NOT DETECTED
SARS-COV-2 RNA RESP QL NAA+PROBE: NOT DETECTED

## 2024-02-27 RX ORDER — DOXYCYCLINE 100 MG/1
100 CAPSULE ORAL 2 TIMES DAILY
Qty: 14 CAPSULE | Refills: 0 | Status: SHIPPED | OUTPATIENT
Start: 2024-02-27 | End: 2024-02-27 | Stop reason: SDUPTHER

## 2024-02-27 RX ORDER — AZELASTINE 1 MG/ML
1 SPRAY, METERED NASAL 2 TIMES DAILY
Qty: 30 ML | Refills: 1 | Status: SHIPPED | OUTPATIENT
Start: 2024-02-27 | End: 2024-05-14 | Stop reason: WASHOUT

## 2024-02-27 RX ORDER — DOXYCYCLINE 100 MG/1
100 CAPSULE ORAL 2 TIMES DAILY
Qty: 14 CAPSULE | Refills: 0 | Status: SHIPPED | OUTPATIENT
Start: 2024-02-27 | End: 2024-03-05

## 2024-02-27 RX ORDER — FLUTICASONE PROPIONATE 50 MCG
1 SPRAY, SUSPENSION (ML) NASAL DAILY
Qty: 16 G | Refills: 1 | Status: SHIPPED | OUTPATIENT
Start: 2024-02-27 | End: 2024-03-11 | Stop reason: SDUPTHER

## 2024-02-27 NOTE — RESULT ENCOUNTER NOTE
Influenza a/B as well as COVID-19 are negative    Also, RSV is negative    I would recommend that she continues over-the-counter therapy and if she continues to feel ill by Wednesday/Thursday of this week I would recommend she reaches back out and we can discuss antibiotic therapy

## 2024-02-29 ENCOUNTER — APPOINTMENT (OUTPATIENT)
Dept: PHYSICAL THERAPY | Facility: CLINIC | Age: 39
End: 2024-02-29
Payer: COMMERCIAL

## 2024-03-04 ENCOUNTER — TREATMENT (OUTPATIENT)
Dept: PHYSICAL THERAPY | Facility: CLINIC | Age: 39
End: 2024-03-04
Payer: COMMERCIAL

## 2024-03-04 DIAGNOSIS — S16.1XXD STRAIN OF NECK MUSCLE, SUBSEQUENT ENCOUNTER: ICD-10-CM

## 2024-03-04 DIAGNOSIS — S09.90XD INJURY OF HEAD, SUBSEQUENT ENCOUNTER: ICD-10-CM

## 2024-03-04 PROCEDURE — 97140 MANUAL THERAPY 1/> REGIONS: CPT | Mod: GP | Performed by: PHYSICAL THERAPIST

## 2024-03-04 ASSESSMENT — PAIN - FUNCTIONAL ASSESSMENT: PAIN_FUNCTIONAL_ASSESSMENT: 0-10

## 2024-03-04 NOTE — PROGRESS NOTES
Physical Therapy    Physical Therapy Treatment    Patient Name: Rosibel Staton  MRN: [id number]  Today's Date: 3/4/2024        PT Therapeutic Procedures Time Entry  Manual Therapy Time Entry: 40     Visit number: 13  Insurance: [facility]  Authorized visits: 12  Authorization end date: 3/26/2024      Assessment:     Patient with decreased cervical tone, improved cervical mobility and decreased headache    Plan:     Continue to work to have patient manage tone independently    Current Problem  1. Injury of head, subsequent encounter  Follow Up In Physical Therapy      2. Strain of neck muscle, subsequent encounter  Follow Up In Physical Therapy        Subjective    Patient states that she had a headache yesterday and now has neck pain all over.    Pain     neck 6/10     Objective     Treatments:  Next visit review all ex for technique    3/6/2024:  Manual therapy to right >left cervical spine especially posterior scalenes and semi spinalis.  Patient with decreased cervical pain at the end of the session.    2/22/2024:  Therapeutic ex:  Cervical stretches with decreased pain,   Manual therapy   Cross fiber semi spinalis, upper trap, levator scap, posterior scalenes, recutus capitis    2/20/2024:  Manual therapy: 30  Treadmill 2.5 MPH for 15 min with about 3000 steps.  Patient encouraged to return to normal activity, not stay in garage with low light  Cervical lateral flexion 1 time  Levator scap stretch 1 time with reported decreased pain    Manual therapy 10  Right side to upper trap and levator scap    2/8/2024:  Shoulder ext green 10 times 1 set *  Rows 10 times 1 set *   Cervical retraction supine and sitting 10 times 1 set *     Manual therapy:  Cross fiber semi spinals, and upper trap right side    2/5/2024:  Upper cervical rotation self  Lower cervical rotation self    Manual therapy  Cross fiber, trigger point levator scap, upper trap and Middle scalenes    2/1/2024:  Manual therapy   Upper cervical rotation limited  "to right severly  C1 rotation to the left, C2-C4 rotated to the right  Cross fiber and Trigger point to SCM, Anterior scalene, paraspinals, semi spinalis  Patient with mild cervical restriction to the right after manual therapy and improve cervical rotation.  As well as decreased pain.    1/29/2024:  Cross fiber and trigger point rhomboids, levator scap, SCM, upper trap and posterior scalenes    1/25/2024:  Cross fiber to Temporalis, pterygoids with patient education for self management.  MFR to posterior scalenes right>left and cross fiber to same muscles.    Prior:  MFR to middle scalenes, upper trap, right > left levator scap left>right rhomboids bilaterally right >left    Use of tennis/raquette ball in pillow case for trigger point self release    Therapeutic Ex  1/29/2024:  Door way pec stretch 3 position 3 times 30\" *   Rhomboid stretch in the door 3 times 30\" *   Sleeper stretch 3 times 30\" *     1/25/2024:  Taping to decreased activation of upper trap and levator scap as well as pectoralis muscles.    1/23/2024:  Door way rhomboid stretch 2 min  Door way pec stretch 2 min 3 positions  Supine chin tucks 10 times 1 set * hold 5 sec  Supine scapular retraction 10 times 1 set * hold 5 sec    PRIOR:  Use of towel roll for decreased cervical thoracic stress and strain  Use of heat 20 min or ice 10 min every hour for pain control  Meditation for relaxation, pain control and sleep  No naps during the day unless normal sleep at night  Walk for 30 minutes daily  Levator scap stretch 3 times 30\" * mod cues for pull to stretch not pain  Upper trap 3 times 30\" * mod cues for pull to stretch not pain  OP EDUCATION:       Goals:  Active       PT Problem       Patient decreased neck disability index score to 16 points for self reported decline in symptoms.        Start:  01/15/24    Expected End:  04/14/24            Patient will increase cervical rotation to 0-60 degrees to allow the patient to scan environment without " pain.  (Progressing)       Start:  01/15/24    Expected End:  04/14/24         Goal Note       Right 0-54 left 0-58              Patient decrease DHI to 20 points for self reported improvement in function.        Start:  01/15/24    Expected End:  04/14/24            Patient report headaches at 0-4/10 to allow increased ease in daily tasks (Progressing)       Start:  01/15/24    Expected End:  04/14/24         Goal Note       Headaches 1-8/10, 2 migraines in the last week, with new meds this week to address.  Patient reports 40% of normal for headache.              Patient will be independent in Home Exercise Program to manage symptoms and maximize their functional independence.  (Progressing)       Start:  01/15/24    Expected End:  04/14/24            Patient Goal: Pain releif       Start:  01/15/24    Expected End:  04/14/24

## 2024-03-05 ENCOUNTER — APPOINTMENT (OUTPATIENT)
Dept: RADIOLOGY | Facility: CLINIC | Age: 39
End: 2024-03-05
Payer: COMMERCIAL

## 2024-03-06 ENCOUNTER — APPOINTMENT (OUTPATIENT)
Dept: RADIOLOGY | Facility: CLINIC | Age: 39
End: 2024-03-06
Payer: COMMERCIAL

## 2024-03-07 ENCOUNTER — APPOINTMENT (OUTPATIENT)
Dept: RADIOLOGY | Facility: CLINIC | Age: 39
End: 2024-03-07
Payer: COMMERCIAL

## 2024-03-07 DIAGNOSIS — N20.0 NEPHROLITHIASIS: Primary | ICD-10-CM

## 2024-03-11 ENCOUNTER — TELEMEDICINE (OUTPATIENT)
Dept: PRIMARY CARE | Facility: CLINIC | Age: 39
End: 2024-03-11
Payer: COMMERCIAL

## 2024-03-11 DIAGNOSIS — R49.0 RASPY VOICE: Primary | ICD-10-CM

## 2024-03-11 DIAGNOSIS — R05.1 ACUTE COUGH: ICD-10-CM

## 2024-03-11 DIAGNOSIS — R09.81 SINUS CONGESTION: ICD-10-CM

## 2024-03-11 DIAGNOSIS — J06.9 ACUTE URI: ICD-10-CM

## 2024-03-11 PROCEDURE — 99214 OFFICE O/P EST MOD 30 MIN: CPT | Performed by: STUDENT IN AN ORGANIZED HEALTH CARE EDUCATION/TRAINING PROGRAM

## 2024-03-11 RX ORDER — FLUTICASONE PROPIONATE 50 MCG
1 SPRAY, SUSPENSION (ML) NASAL 2 TIMES DAILY
Qty: 16 G | Refills: 1 | Status: SHIPPED | OUTPATIENT
Start: 2024-03-11 | End: 2024-05-14 | Stop reason: WASHOUT

## 2024-03-11 NOTE — PROGRESS NOTES
Subjective   Patient ID: Rosibel Staton is a 38 y.o. female who presents for Cough and Sinusitis.    HPI   Patient is calling the office today via a telemedicine virtual visit to discuss continued signs/symptoms of respiratory issues as well as a raspy voice    Ultimately she was seen by myself on 2/26/2024 and was treated appropriately with antibiotics and nasal sprays    Ever since she continues to have a raspy voice as well as feeling that she can even sing or even speak without this raspy voice    Stated that 5 years ago she did see an ENT and had sinus surgery as well as stated that when she was on lithium her voice changes that she believes was permanent    Now she is calling to the office to discuss her raspy voice and now asking what is the next step  Continues to smoke daily    Stated that she has only been using the Astelin as she did not get a refill of the fluticasone    Asking for advice/recommendations and wondering if she has to follow-up with ENT again  Review of Systems  All pertinent positive symptoms are included in the history of present illness.    All other systems have been reviewed and are negative and noncontributory to this patient's current ailments.    Objective   There were no vitals taken for this visit.    Physical Exam  CONSTITUTIONAL - well nourished, well developed, looks like stated age, in no acute distress, not ill-appearing, and not tired appearing, raspy voice  SKIN - normal skin color and pigmentation, normal skin turgor without rash, lesions, or nodules visualized  HEAD - no trauma, normocephalic  EYES - normal external exam  CHEST -no distressed breathing, good effort  EXTREMITIES - no edema, no deformities  NEUROLOGICAL - normal balance, normal motor, no ataxia  PSYCHIATRIC - alert, pleasant and cordial, age-appropriate    Assessment/Plan   Due to all of your signs/symptoms continuing I placed a referral to an otolaryngologist    Please make appointment at your earliest  major convenience    Alongside this I also prescribed fluticasone to take alongside your azelastine    Please use these twice daily as prescribed for the next 4 to 6 weeks    At that time you will hopefully have an appoint with otolaryngology to discuss your sign/symptoms    Please contact the office if worsening or acute signs/symptoms occur

## 2024-03-15 ENCOUNTER — TREATMENT (OUTPATIENT)
Dept: PHYSICAL THERAPY | Facility: CLINIC | Age: 39
End: 2024-03-15
Payer: COMMERCIAL

## 2024-03-15 DIAGNOSIS — S16.1XXD STRAIN OF NECK MUSCLE, SUBSEQUENT ENCOUNTER: ICD-10-CM

## 2024-03-15 DIAGNOSIS — S09.90XD INJURY OF HEAD, SUBSEQUENT ENCOUNTER: ICD-10-CM

## 2024-03-15 PROCEDURE — 97110 THERAPEUTIC EXERCISES: CPT | Mod: GP | Performed by: PHYSICAL THERAPIST

## 2024-03-15 PROCEDURE — 97140 MANUAL THERAPY 1/> REGIONS: CPT | Mod: GP | Performed by: PHYSICAL THERAPIST

## 2024-03-15 ASSESSMENT — PAIN SCALES - GENERAL: PAINLEVEL_OUTOF10: 5 - MODERATE PAIN

## 2024-03-15 ASSESSMENT — PAIN - FUNCTIONAL ASSESSMENT: PAIN_FUNCTIONAL_ASSESSMENT: 0-10

## 2024-03-15 NOTE — PROGRESS NOTES
"Physical Therapy    Physical Therapy Treatment    Patient Name: Rosibel Staton  MRN: [id number]  Today's Date: 3/4/2024  Time Calculation  Start Time: 0100  Stop Time: 0145  Time Calculation (min): 45 min     PT Therapeutic Procedures Time Entry  Manual Therapy Time Entry: 30  Therapeutic Exercise Time Entry: 10     Visit number: 13  Insurance: [facility]  Authorized visits: 12 plus 12  Authorization end date: 3/26/2024      Assessment:     Patient with decreased cervical tone, improved cervical mobility after manual therapy as well as decreased pain to 2/10.    Plan:     Continue to work to have patient manage tone independently    Current Problem  1. Injury of head, subsequent encounter  Follow Up In Physical Therapy      2. Strain of neck muscle, subsequent encounter  Follow Up In Physical Therapy        Subjective    Patient states that she has a lot of stress due to getting a divorce.  Patient states that she woke up with really bad neck pain and has done heat, \"melt\" and stretching this AM with overall decreased pain.    Pain  Pain Assessment  Pain Assessment: 0-10  Pain Score: 5 - Moderate pain (neck)     Objective     Treatments:    3/15/2024:  Stretches reviewed with good technique and understanding of which exercises to do to manage her painful area.    Patient shown theracane and options on line for decrease cost.    3/6/2024:  Manual therapy to right >left cervical spine especially posterior scalenes and semi spinalis.  Patient with decreased cervical pain at the end of the session.    2/22/2024:  Therapeutic ex:  Cervical stretches with decreased pain,   Manual therapy   Cross fiber semi spinalis, upper trap, levator scap, posterior scalenes, recutus capitis    2/20/2024:  Manual therapy: 30  Treadmill 2.5 MPH for 15 min with about 3000 steps.  Patient encouraged to return to normal activity, not stay in garage with low light  Cervical lateral flexion 1 time  Levator scap stretch 1 time with reported " "decreased pain    Manual therapy 10  Right side to upper trap and levator scap    2/8/2024:  Shoulder ext green 10 times 1 set *  Rows 10 times 1 set *   Cervical retraction supine and sitting 10 times 1 set *     Manual therapy:  Cross fiber semi spinals, and upper trap right side    2/5/2024:  Upper cervical rotation self  Lower cervical rotation self    Manual therapy  Cross fiber, trigger point levator scap, upper trap and Middle scalenes    2/1/2024:  Manual therapy   Upper cervical rotation limited to right severly  C1 rotation to the left, C2-C4 rotated to the right  Cross fiber and Trigger point to SCM, Anterior scalene, paraspinals, semi spinalis  Patient with mild cervical restriction to the right after manual therapy and improve cervical rotation.  As well as decreased pain.    1/29/2024:  Cross fiber and trigger point rhomboids, levator scap, SCM, upper trap and posterior scalenes    1/25/2024:  Cross fiber to Temporalis, pterygoids with patient education for self management.  MFR to posterior scalenes right>left and cross fiber to same muscles.    Prior:  MFR to middle scalenes, upper trap, right > left levator scap left>right rhomboids bilaterally right >left    Use of tennis/raquette ball in pillow case for trigger point self release    Therapeutic Ex  1/29/2024:  Door way pec stretch 3 position 3 times 30\" *   Rhomboid stretch in the door 3 times 30\" *   Sleeper stretch 3 times 30\" *     1/25/2024:  Taping to decreased activation of upper trap and levator scap as well as pectoralis muscles.    1/23/2024:  Door way rhomboid stretch 2 min  Door way pec stretch 2 min 3 positions  Supine chin tucks 10 times 1 set * hold 5 sec  Supine scapular retraction 10 times 1 set * hold 5 sec    PRIOR:  Use of towel roll for decreased cervical thoracic stress and strain  Use of heat 20 min or ice 10 min every hour for pain control  Meditation for relaxation, pain control and sleep  No naps during the day unless " "normal sleep at night  Walk for 30 minutes daily  Levator scap stretch 3 times 30\" * mod cues for pull to stretch not pain  Upper trap 3 times 30\" * mod cues for pull to stretch not pain  OP EDUCATION:       Goals:      "

## 2024-03-18 ENCOUNTER — TREATMENT (OUTPATIENT)
Dept: PHYSICAL THERAPY | Facility: CLINIC | Age: 39
End: 2024-03-18
Payer: COMMERCIAL

## 2024-03-18 DIAGNOSIS — S16.1XXD STRAIN OF NECK MUSCLE, SUBSEQUENT ENCOUNTER: ICD-10-CM

## 2024-03-18 DIAGNOSIS — S09.90XD INJURY OF HEAD, SUBSEQUENT ENCOUNTER: ICD-10-CM

## 2024-03-18 PROCEDURE — 97110 THERAPEUTIC EXERCISES: CPT | Mod: GP | Performed by: PHYSICAL THERAPIST

## 2024-03-18 PROCEDURE — 97140 MANUAL THERAPY 1/> REGIONS: CPT | Mod: GP | Performed by: PHYSICAL THERAPIST

## 2024-03-18 ASSESSMENT — PAIN - FUNCTIONAL ASSESSMENT: PAIN_FUNCTIONAL_ASSESSMENT: 0-10

## 2024-03-19 ENCOUNTER — APPOINTMENT (OUTPATIENT)
Dept: RADIOLOGY | Facility: CLINIC | Age: 39
End: 2024-03-19
Payer: COMMERCIAL

## 2024-03-21 ENCOUNTER — TREATMENT (OUTPATIENT)
Dept: PHYSICAL THERAPY | Facility: CLINIC | Age: 39
End: 2024-03-21
Payer: COMMERCIAL

## 2024-03-21 ENCOUNTER — HOSPITAL ENCOUNTER (OUTPATIENT)
Dept: RADIOLOGY | Facility: CLINIC | Age: 39
Discharge: HOME | End: 2024-03-21
Payer: COMMERCIAL

## 2024-03-21 DIAGNOSIS — S09.90XD INJURY OF HEAD, SUBSEQUENT ENCOUNTER: ICD-10-CM

## 2024-03-21 DIAGNOSIS — S16.1XXD STRAIN OF NECK MUSCLE, SUBSEQUENT ENCOUNTER: ICD-10-CM

## 2024-03-21 DIAGNOSIS — N20.0 NEPHROLITHIASIS: ICD-10-CM

## 2024-03-21 PROCEDURE — 76770 US EXAM ABDO BACK WALL COMP: CPT

## 2024-03-21 PROCEDURE — 97140 MANUAL THERAPY 1/> REGIONS: CPT | Mod: GP | Performed by: PHYSICAL THERAPIST

## 2024-03-21 PROCEDURE — 76770 US EXAM ABDO BACK WALL COMP: CPT | Performed by: RADIOLOGY

## 2024-03-21 ASSESSMENT — PAIN SCALES - GENERAL: PAINLEVEL_OUTOF10: 0 - NO PAIN

## 2024-03-21 ASSESSMENT — PAIN - FUNCTIONAL ASSESSMENT: PAIN_FUNCTIONAL_ASSESSMENT: 0-10

## 2024-03-21 NOTE — PROGRESS NOTES
Physical Therapy    Physical Therapy Treatment    Patient Name: Rosibel Staton  MRN: [id number]  Today's Date: 3/19/2024  Time Calculation  Start Time: 0945  Stop Time: 1030  Time Calculation (min): 45 min     PT Therapeutic Procedures Time Entry  Manual Therapy Time Entry: 40     Visit number: 14  Insurance: [facility]  Authorized visits: 12 plus 12  Authorization end date: 3/26/2024      Assessment:      improved cervical mobility after manual therapy and decreased stiffness.     Plan:     Continue to work to have patient manage tone independently    Current Problem  1. Injury of head, subsequent encounter  Follow Up In Physical Therapy      2. Strain of neck muscle, subsequent encounter  Follow Up In Physical Therapy            Subjective    Patient states I am just stiff today.    Pain  Pain Assessment  Pain Assessment: 0-10  Pain Score: 0 - No pain  Pain Type:  (neck stiff right)      Objective     Treatments:  3/21/2024:  Manual therapy  Cross fiber SCM, semispinalis, posterior scalenes right > left.    3/18/2024:  Therapeutic ex: 10  Doorway 3 position cues for elbows  Rhombliod stretch 30    Manual 30  CRC for right upper trap and levator scap with good release  Cross fiber and trigger point to posterior scalenes, semispinalis    3/15/2024:  Stretches reviewed with good technique and understanding of which exercises to do to manage her painful area.    Patient shown theracane and options on line for decrease cost.    3/6/2024:  Manual therapy to right >left cervical spine especially posterior scalenes and semi spinalis.  Patient with decreased cervical pain at the end of the session.    2/22/2024:  Therapeutic ex:  Cervical stretches with decreased pain,   Manual therapy   Cross fiber semi spinalis, upper trap, levator scap, posterior scalenes, recutus capitis    2/20/2024:  Manual therapy: 30  Treadmill 2.5 MPH for 15 min with about 3000 steps.  Patient encouraged to return to normal activity, not stay in garage  "with low light  Cervical lateral flexion 1 time  Levator scap stretch 1 time with reported decreased pain    Manual therapy 10  Right side to upper trap and levator scap    2/8/2024:  Shoulder ext green 10 times 1 set *  Rows 10 times 1 set *   Cervical retraction supine and sitting 10 times 1 set *     Manual therapy:  Cross fiber semi spinals, and upper trap right side    2/5/2024:  Upper cervical rotation self  Lower cervical rotation self    Manual therapy  Cross fiber, trigger point levator scap, upper trap and Middle scalenes    2/1/2024:  Manual therapy   Upper cervical rotation limited to right severly  C1 rotation to the left, C2-C4 rotated to the right  Cross fiber and Trigger point to SCM, Anterior scalene, paraspinals, semi spinalis  Patient with mild cervical restriction to the right after manual therapy and improve cervical rotation.  As well as decreased pain.    1/29/2024:  Cross fiber and trigger point rhomboids, levator scap, SCM, upper trap and posterior scalenes    1/25/2024:  Cross fiber to Temporalis, pterygoids with patient education for self management.  MFR to posterior scalenes right>left and cross fiber to same muscles.    Prior:  MFR to middle scalenes, upper trap, right > left levator scap left>right rhomboids bilaterally right >left    Use of tennis/raquette ball in pillow case for trigger point self release    Therapeutic Ex  1/29/2024:  Door way pec stretch 3 position 3 times 30\" *   Rhomboid stretch in the door 3 times 30\" *   Sleeper stretch 3 times 30\" *     1/25/2024:  Taping to decreased activation of upper trap and levator scap as well as pectoralis muscles.    1/23/2024:  Door way rhomboid stretch 2 min  Door way pec stretch 2 min 3 positions  Supine chin tucks 10 times 1 set * hold 5 sec  Supine scapular retraction 10 times 1 set * hold 5 sec    PRIOR:  Use of towel roll for decreased cervical thoracic stress and strain  Use of heat 20 min or ice 10 min every hour for pain " "control  Meditation for relaxation, pain control and sleep  No naps during the day unless normal sleep at night  Walk for 30 minutes daily  Levator scap stretch 3 times 30\" * mod cues for pull to stretch not pain  Upper trap 3 times 30\" * mod cues for pull to stretch not pain  OP EDUCATION:       Goals:  Active       PT Problem       Patient decreased neck disability index score to 16 points for self reported decline in symptoms.        Start:  01/15/24    Expected End:  04/14/24            Patient will increase cervical rotation to 0-60 degrees to allow the patient to scan environment without pain.  (Progressing)       Start:  01/15/24    Expected End:  04/14/24         Goal Note       Right 0-54 left 0-58              Patient decrease DHI to 20 points for self reported improvement in function.        Start:  01/15/24    Expected End:  04/14/24            Patient report headaches at 0-4/10 to allow increased ease in daily tasks (Progressing)       Start:  01/15/24    Expected End:  04/14/24         Goal Note       Headaches 1-8/10, 2 migraines in the last week, with new meds this week to address.  Patient reports 40% of normal for headache.              Patient will be independent in Home Exercise Program to manage symptoms and maximize their functional independence.  (Progressing)       Start:  01/15/24    Expected End:  04/14/24            Patient Goal: Pain releif       Start:  01/15/24    Expected End:  04/14/24                "

## 2024-03-24 NOTE — PROGRESS NOTES
Physical Therapy    Physical Therapy Treatment    Patient Name: Rosibel Staton  MRN: [id number]  Today's Date: 3/19/2024              Visit number: 15   Insurance: [facility]  Authorized visits: 12 plus 12  Authorization end date: 3/26/2024      Assessment:     Patient with increased tone and pain this date probably due to increased pain.     Plan:     Continue to work to have patient manage tone independently    Current Problem  1. Injury of head, subsequent encounter  Follow Up In Physical Therapy      2. Strain of neck muscle, subsequent encounter  Follow Up In Physical Therapy              Subjective    Patient states she has done heat for 20 min 2 times this AM and stretch four time these things decrease her symptoms.    Pain  Pain Assessment  Pain Assessment: 0-10  Pain Score:  (neck 2-3/10 headache in front and base of skull)      Objective     Treatments:  3/26/2024:    Patient with increased tone in posterior scalenes, semispinalis, splenius capitis.    Therapeutic ex: 10    Patient re educated about her posture and need to sit upright with her chin retracted to decreased her posterior cervical tone.  Patient with decreased pain with upright posture. Trial of both posture types and she noted decreased pain in her neck.    3/21/2024:  Manual therapy  Cross fiber SCM, semispinalis, posterior scalenes right > left.    3/18/2024:  Therapeutic ex: 10  Doorway 3 position cues for elbows  Rhombliod stretch 30    Manual 30  CRC for right upper trap and levator scap with good release  Cross fiber and trigger point to posterior scalenes, semispinalis    3/15/2024:  Stretches reviewed with good technique and understanding of which exercises to do to manage her painful area.    Patient shown theracane and options on line for decrease cost.    3/6/2024:  Manual therapy to right >left cervical spine especially posterior scalenes and semi spinalis.  Patient with decreased cervical pain at the end of the  "session.    2/22/2024:  Therapeutic ex:  Cervical stretches with decreased pain,   Manual therapy   Cross fiber semi spinalis, upper trap, levator scap, posterior scalenes, recutus capitis    2/20/2024:  Manual therapy: 30  Treadmill 2.5 MPH for 15 min with about 3000 steps.  Patient encouraged to return to normal activity, not stay in garage with low light  Cervical lateral flexion 1 time  Levator scap stretch 1 time with reported decreased pain    Manual therapy 10  Right side to upper trap and levator scap    2/8/2024:  Shoulder ext green 10 times 1 set *  Rows 10 times 1 set *   Cervical retraction supine and sitting 10 times 1 set *     Manual therapy:  Cross fiber semi spinals, and upper trap right side    2/5/2024:  Upper cervical rotation self  Lower cervical rotation self    Manual therapy  Cross fiber, trigger point levator scap, upper trap and Middle scalenes    2/1/2024:  Manual therapy   Upper cervical rotation limited to right severly  C1 rotation to the left, C2-C4 rotated to the right  Cross fiber and Trigger point to SCM, Anterior scalene, paraspinals, semi spinalis  Patient with mild cervical restriction to the right after manual therapy and improve cervical rotation.  As well as decreased pain.    1/29/2024:  Cross fiber and trigger point rhomboids, levator scap, SCM, upper trap and posterior scalenes    1/25/2024:  Cross fiber to Temporalis, pterygoids with patient education for self management.  MFR to posterior scalenes right>left and cross fiber to same muscles.    Prior:  MFR to middle scalenes, upper trap, right > left levator scap left>right rhomboids bilaterally right >left    Use of tennis/raquette ball in pillow case for trigger point self release    Therapeutic Ex  1/29/2024:  Door way pec stretch 3 position 3 times 30\" *   Rhomboid stretch in the door 3 times 30\" *   Sleeper stretch 3 times 30\" *     1/25/2024:  Taping to decreased activation of upper trap and levator scap as well as " "pectoralis muscles.    1/23/2024:  Door way rhomboid stretch 2 min  Door way pec stretch 2 min 3 positions  Supine chin tucks 10 times 1 set * hold 5 sec  Supine scapular retraction 10 times 1 set * hold 5 sec    PRIOR:  Use of towel roll for decreased cervical thoracic stress and strain  Use of heat 20 min or ice 10 min every hour for pain control  Meditation for relaxation, pain control and sleep  No naps during the day unless normal sleep at night  Walk for 30 minutes daily  Levator scap stretch 3 times 30\" * mod cues for pull to stretch not pain  Upper trap 3 times 30\" * mod cues for pull to stretch not pain  OP EDUCATION:       Goals:  Active       PT Problem       Patient decreased neck disability index score to 16 points for self reported decline in symptoms.        Start:  01/15/24    Expected End:  04/14/24            Patient will increase cervical rotation to 0-60 degrees to allow the patient to scan environment without pain.  (Progressing)       Start:  01/15/24    Expected End:  04/14/24       Right 0-50  Left 0-60         Patient decrease DHI to 20 points for self reported improvement in function.        Start:  01/15/24    Expected End:  04/14/24            Patient report headaches at 0-4/10 to allow increased ease in daily tasks (Not Progressing)       Start:  01/15/24    Expected End:  04/14/24       Daily headache in last week and 2-3 migraines.  In temples the last week worse since getting letter [facility]         Patient will be independent in Home Exercise Program to manage symptoms and maximize their functional independence.  (Met)       Start:  01/15/24    Expected End:  04/14/24    Resolved:  03/26/24         Patient Goal: Pain releif       Start:  01/15/24    Expected End:  04/14/24                "

## 2024-03-26 ENCOUNTER — TREATMENT (OUTPATIENT)
Dept: PHYSICAL THERAPY | Facility: CLINIC | Age: 39
End: 2024-03-26
Payer: COMMERCIAL

## 2024-03-26 DIAGNOSIS — S16.1XXD STRAIN OF NECK MUSCLE, SUBSEQUENT ENCOUNTER: ICD-10-CM

## 2024-03-26 DIAGNOSIS — S09.90XD INJURY OF HEAD, SUBSEQUENT ENCOUNTER: Primary | ICD-10-CM

## 2024-03-26 PROCEDURE — 97110 THERAPEUTIC EXERCISES: CPT | Mod: GP | Performed by: PHYSICAL THERAPIST

## 2024-03-26 PROCEDURE — 97140 MANUAL THERAPY 1/> REGIONS: CPT | Mod: GP | Performed by: PHYSICAL THERAPIST

## 2024-03-26 ASSESSMENT — PAIN - FUNCTIONAL ASSESSMENT: PAIN_FUNCTIONAL_ASSESSMENT: 0-10

## 2024-03-29 ENCOUNTER — APPOINTMENT (OUTPATIENT)
Dept: PHYSICAL THERAPY | Facility: CLINIC | Age: 39
End: 2024-03-29
Payer: COMMERCIAL

## 2024-03-29 NOTE — PROGRESS NOTES
Physical Therapy    Physical Therapy Treatment    Patient Name: Rosibel Staton  MRN: [id number]  Today's Date: 3/19/2024              Visit number: 15   Insurance: [facility]  Authorized visits: 12 plus 12  Authorization end date: 3/26/2024      Assessment:     Patient with increased tone and pain this date probably due to increased pain.     Plan:     Continue to work to have patient manage tone independently    Current Problem  No diagnosis found.          Subjective    Patient states she has done heat for 20 min 2 times this AM and stretch four time these things decrease her symptoms.    Pain         Objective     Treatments:  3/26/2024:    Patient with increased tone in posterior scalenes, semispinalis, splenius capitis.    Therapeutic ex: 10    Patient re educated about her posture and need to sit upright with her chin retracted to decreased her posterior cervical tone.  Patient with decreased pain with upright posture. Trial of both posture types and she noted decreased pain in her neck.    3/21/2024:  Manual therapy  Cross fiber SCM, semispinalis, posterior scalenes right > left.    3/18/2024:  Therapeutic ex: 10  Doorway 3 position cues for elbows  Rhombliod stretch 30    Manual 30  CRC for right upper trap and levator scap with good release  Cross fiber and trigger point to posterior scalenes, semispinalis    3/15/2024:  Stretches reviewed with good technique and understanding of which exercises to do to manage her painful area.    Patient shown theracane and options on line for decrease cost.    3/6/2024:  Manual therapy to right >left cervical spine especially posterior scalenes and semi spinalis.  Patient with decreased cervical pain at the end of the session.    2/22/2024:  Therapeutic ex:  Cervical stretches with decreased pain,   Manual therapy   Cross fiber semi spinalis, upper trap, levator scap, posterior scalenes, recutus capitis    2/20/2024:  Manual therapy: 30  Treadmill 2.5 MPH for 15 min with about  "3000 steps.  Patient encouraged to return to normal activity, not stay in garage with low light  Cervical lateral flexion 1 time  Levator scap stretch 1 time with reported decreased pain    Manual therapy 10  Right side to upper trap and levator scap    2/8/2024:  Shoulder ext green 10 times 1 set *  Rows 10 times 1 set *   Cervical retraction supine and sitting 10 times 1 set *     Manual therapy:  Cross fiber semi spinals, and upper trap right side    2/5/2024:  Upper cervical rotation self  Lower cervical rotation self    Manual therapy  Cross fiber, trigger point levator scap, upper trap and Middle scalenes    2/1/2024:  Manual therapy   Upper cervical rotation limited to right severly  C1 rotation to the left, C2-C4 rotated to the right  Cross fiber and Trigger point to SCM, Anterior scalene, paraspinals, semi spinalis  Patient with mild cervical restriction to the right after manual therapy and improve cervical rotation.  As well as decreased pain.    1/29/2024:  Cross fiber and trigger point rhomboids, levator scap, SCM, upper trap and posterior scalenes    1/25/2024:  Cross fiber to Temporalis, pterygoids with patient education for self management.  MFR to posterior scalenes right>left and cross fiber to same muscles.    Prior:  MFR to middle scalenes, upper trap, right > left levator scap left>right rhomboids bilaterally right >left    Use of tennis/raquette ball in pillow case for trigger point self release    Therapeutic Ex  1/29/2024:  Door way pec stretch 3 position 3 times 30\" *   Rhomboid stretch in the door 3 times 30\" *   Sleeper stretch 3 times 30\" *     1/25/2024:  Taping to decreased activation of upper trap and levator scap as well as pectoralis muscles.    1/23/2024:  Door way rhomboid stretch 2 min  Door way pec stretch 2 min 3 positions  Supine chin tucks 10 times 1 set * hold 5 sec  Supine scapular retraction 10 times 1 set * hold 5 sec    PRIOR:  Use of towel roll for decreased cervical " "thoracic stress and strain  Use of heat 20 min or ice 10 min every hour for pain control  Meditation for relaxation, pain control and sleep  No naps during the day unless normal sleep at night  Walk for 30 minutes daily  Levator scap stretch 3 times 30\" * mod cues for pull to stretch not pain  Upper trap 3 times 30\" * mod cues for pull to stretch not pain  OP EDUCATION:       Goals:      "

## 2024-04-02 ENCOUNTER — OFFICE VISIT (OUTPATIENT)
Dept: OTOLARYNGOLOGY | Facility: CLINIC | Age: 39
End: 2024-04-02
Payer: COMMERCIAL

## 2024-04-02 VITALS — TEMPERATURE: 97.6 F | WEIGHT: 137.9 LBS | HEIGHT: 66 IN | BODY MASS INDEX: 22.16 KG/M2

## 2024-04-02 DIAGNOSIS — J38.00 VOCAL CORD PARESIS: ICD-10-CM

## 2024-04-02 DIAGNOSIS — R49.0 RASPY VOICE: ICD-10-CM

## 2024-04-02 DIAGNOSIS — J34.89 NASAL AND SINUS DISCHARGE: ICD-10-CM

## 2024-04-02 DIAGNOSIS — J34.89 RHINORRHEA: Primary | ICD-10-CM

## 2024-04-02 DIAGNOSIS — R49.0 DYSPHONIA: ICD-10-CM

## 2024-04-02 PROCEDURE — 31231 NASAL ENDOSCOPY DX: CPT | Performed by: NURSE PRACTITIONER

## 2024-04-02 PROCEDURE — 99204 OFFICE O/P NEW MOD 45 MIN: CPT | Performed by: NURSE PRACTITIONER

## 2024-04-02 ASSESSMENT — PATIENT HEALTH QUESTIONNAIRE - PHQ9
SUM OF ALL RESPONSES TO PHQ9 QUESTIONS 1 AND 2: 2
1. LITTLE INTEREST OR PLEASURE IN DOING THINGS: SEVERAL DAYS
2. FEELING DOWN, DEPRESSED OR HOPELESS: SEVERAL DAYS
10. IF YOU CHECKED OFF ANY PROBLEMS, HOW DIFFICULT HAVE THESE PROBLEMS MADE IT FOR YOU TO DO YOUR WORK, TAKE CARE OF THINGS AT HOME, OR GET ALONG WITH OTHER PEOPLE: VERY DIFFICULT

## 2024-04-02 NOTE — PROGRESS NOTES
Subjective   Patient ID: Rosibel Staton is a 38 y.o. female who presents for New Patient Visit (Congestion.) and Hoarseness.     HPI  Rosibel Staton is a 38 y.o. old female, referred by Dr. Charli Cassidy, presenting with complaints of sinus and nose problems that began about 1-2 months ago when she had an URI. She first noticed her voice change in 2018 after starting lithium. She has not been on lithium for a couple of years, her voice never improved after stopping but she has noticed it has worsened over the past six months. The patient describes the sinus/nose problems as sudden  onset of rhinitis (bilateral, clear) and constant throat clearing. Previously had nasal congestion in which she used Flonase nasal spray. Patient denies facial pressure, facial pain, periorbital edema, loss of taste, and loss of smell. The patient denies epistaxis. The patient has taken Azelastine and Flonase nasal spray beginning in February and ending in March of 2024. This helped and nasal congestion / blockage has resolved. Feels nasal drainage and hoarseness remain unchanged. Has previously used nasal saline irrigations years ago after sinus surgery. Does have a history of allergic rhinitis or allergies. Has had previous allergy testing. They deny a history of aspirin sensitivity, or asthma. They report 0 sinus infections per year requiring antibiotic treatment since sinus surgery. Most recent antibiotic usage includes: Doxycycline BID X 7 days for URI. She did feel better after this. Smokes Marijuana daily.     History of prior nasal/sinus surgery or procedure: Sinus surgery by Dr. Barcenas in 2015 or 2016. Believes it was only bilateral maxillary sinuses operated on.     I have also reviewed prior note from Dr. Charli Cassidy dated 3/11/24 and this is contributing to my history and assessment.     I personally reviewed the patients CT scan images and results from 12/11/2023: CT Head: Mild focal mucosal thickening involves  the right sphenoid sinus.  Trace secretions/fluid in the right sphenoid sinus.  Mild left/right frontal sinus mucosal thickening. There is also mild thickening in the left anterior ethmoid region.     Review of Systems  Review of systems is negative for constitutional, ophthalmological, cardiac, pulmonary, renal, gastrointestinal, musculoskeletal, mental health, endocrine, or neurologic disorders (except as listed in the HPI, PMH, and Problem List).     Objective   Physical Exam  CONSTITUTIONAL: Vital signs reviewed. Patient appears well developed and well nourished.   GENERAL: this is a healthy appearing female who appears stated age. The patient is alert and appropriately verbally conversant with hoarseness. This patient is in no apparent distress.   FACE: The face was inspected and no cutaneous masses or lesions were visualized. There was no erythema or edema noted. Facial movement was symmetric. No skin lesions were detected. There was no sinus tenderness elicited. TMJ crepitus present.   EYES: Extra-ocular muscle function was intact. No nystagmus was observed. Pupils were equal.   CRANIAL NERVES: Cranial nerves II, III, IV, and VI were noted to be intact via extra-ocular muscle movement testing. Cranial nerve VII noted to be intact and symmetric by facial movement. Cranial nerves IX and X noted to be intact by gag reflex and palatal movement. Cranial nerve XII noted to be intact by active and symmetric tongue movement.   NOSE: Examination of the nose revealed the nasal dorsum to be midline. Intranasal exam reveals the septum is midline. The inferior turbinates were hypertrophic. No masses, polyps, mucopus, or other lesion on anterior rhinoscopy. See below procedure note as applicable for further exam.  ORAL CAVITY: Examination of the oral cavity revealed no mass lesions nor infection. The palate was noted to be intact. The tongue exhibited normal mobility. Mucosa was moist without lesion. The lips were free of  lesion. Gums were free of inflammation. Dentition: normal without obvious infection or inflammation  OROPHARYNX: The oral pharynx was free of mass lesion or mucosal abnormality. The palate was noted to be without lesion. The uvula was normal appearing. The tonsils were Absent.  EARS: Examination of the ears revealed that the auricles were normally formed with no lesions. The external auditory canals were normal. The tympanic membranes were intact.  There is no inflammation visualized.   NECK: Visualization and palpation of the neck revealed no mass lesions. No skin lesions or inflammatory processes were detected. The cervical musculature was normal to palpation.   CERVICAL LYMPHATICS: There were no palpable lymph nodes in the posterior triangle, submandibular triangle, jugulodigastric region, or central neck.  RESPIRATORY: Normal inspiration and expiration and chest wall expansion, no use of accessory muscles to breathe, no stridor.  NEUROLOGICAL: Patient is ambulatory without assist. Mentation is clear. Answering questions appropriately.     Nasal / sinus endoscopy    Date/Time: 4/2/2024 11:25 AM    Performed by: CLIFF Laughlin  Authorized by: CLIFF Laughlin    Consent:     Consent obtained:  Verbal    Consent given by:  Patient    Risks discussed:  Bleeding, infection and pain    Alternatives discussed:  No treatment, observation and delayed treatment  Procedure details:     Indications: assessment of airway and sino-nasal symptoms      Medication:  Afrin and Jay-Synephrine 2%    Instrument: flexible fiberoptic nasal endoscope      Scope location: bilateral nare    Post-procedure details:     Patient tolerance of procedure:  Tolerated well, no immediate complications  Comments:      Findings: After topical decongestion with decongestant and anesthetic spray, nasal endoscopy was performed using an endoscope. The septum was midline. The inferior turbinates were hypertrophic.  The middle  turbinates appeared healthy, the middle meatus is free of purulence, masses, lesions or polyps. There is a patent maxillary and partial ethmoid bilaterally. Both patent without purulence or edema. The superior meatus and sphenoethmoid recess are clear bilaterally. The nasal passageway is patent. The nasopharynx was clear. On view of the larynx, pooling of secretions in the posterior pharynx. There is edema to the true vocal cords and a vocal cord gap with slight paresis of the left true vocal cord. There were no complications and the patient tolerated the procedure well.        Assessment/Plan     Rosibel Staton is a 38 y.o. year old female with symptoms and clinical findings consistent with chronic rhinitis and history of hoarseness. Hoarseness exacerbated by recent illness, marijuana use and scope exam revealing of edema to the true vocal cords and a left vocal cord gap. Appears to be a slight left vocal cord paresis, potentially secondary to prior lithium use.      Plan:  Nasal endoscopy: Findings: septum midline, patent maxillary bilaterally without purulence. Vocal cord edema, left vocal cord gap  I personally reviewed the patients CT scan images and results from 12/11/2023: CT Head: Mild focal mucosal thickening involves the right sphenoid sinus.  Trace secretions/fluid in the right sphenoid sinus.  Mild left/right frontal sinus mucosal thickening. There is also mild thickening in the left anterior ethmoid region.   I discussed the findings the patient and offered reassurance and counseling.  We agreed to proceed with therapeutic measures to address the issues noted above.   1. We will have the patient continue a steroid nasal spray to help improve nasal symptoms. Prescription for fluticasone was given. I recommended the patient trial this for at least 1 month. They were instructed to use the spray 2 puffs in each nostril daily. She was also instructed on the appropriate use of the medication, by point it  towards the lateral wall of the nose/corner of the eye on the same side.    2. I recommended the patient begin speech therapy for her dysphonia. A referral was placed today.  3. Lastly, following speech therapy, I recommended a follow up with Dr. Joseph North for procedures, such as injections if applicable.   4. I did contact the patient once receiving her imaging from [facility]. We discussed that she has some level of chronic sinusitis. Would recommend beginning with fluticasone nasal spray and if not improving in 2 months, to return to clinic for further treatment options.     All questions were answered and patient agrees with established plan of care.

## 2024-04-02 NOTE — PATIENT INSTRUCTIONS
Today you were evaluated by Kimberly Bar CNP.    Please follow-up as needed. If you have any questions or concerns, please contact my office at [phone].     Stop smoking.    Speech Therapy Referral Number: [phone]. Please call for this appointment.    Please contact our scheduling and  service at [phone]. They will work with you to get your test, imaging, or other appointments scheduled that might have been ordered.     MEDICATED NASAL SPRAY INSTRUCTIONS  Please take the prescribed nasal spray as directed. BE SURE TO POINT THE SPRAY TOWARDS THE CORNER OF THE EYE ON THE SAME SIDE NOSTRIL. This will ensure you are treating the appropriate parts of your nose that are swollen or inflamed.  This medication will take upwards of one month before you notice full benefit. You MUST use it every day for it to be effective.

## 2024-04-02 NOTE — LETTER
April 2, 2024     Charli Cassidy DO  [address]  [facility], [address]  [facility] [address]    Patient: Rosibel Staton   YOB: 1985   Date of Visit: 4/2/2024       Dear Dr. Charli Cassidy DO:    Thank you for referring Rosibel Staton to me for evaluation. Below are my notes for this consultation.  If you have questions, please do not hesitate to call me. I look forward to following your patient along with you.       Sincerely,     Kimberly Bar, APRN-CNP      CC: No Recipients  ______________________________________________________________________________________    Subjective   Patient ID: Rosibel Staton is a 38 y.o. female who presents for New Patient Visit (Congestion.) and Hoarseness.     HPI  Rosibel Staton is a 38 y.o. old female, referred by Dr. Charli Cassidy, presenting with complaints of sinus and nose problems that began about 1-2 months ago when she had an URI. She first noticed her voice change in 2018 after starting lithium. She has not been on lithium for a couple of years, her voice never improved after stopping but she has noticed it has worsened over the past six months. The patient describes the sinus/nose problems as sudden  onset of rhinitis (bilateral, clear) and constant throat clearing. Previously had nasal congestion in which she used Flonase nasal spray. Patient denies facial pressure, facial pain, periorbital edema, loss of taste, and loss of smell. The patient denies epistaxis. The patient has taken Azelastine and Flonase nasal spray beginning in February and ending in March of 2024. This helped and nasal congestion / blockage has resolved. Feels nasal drainage and hoarseness remain unchanged. Has previously used nasal saline irrigations years ago after sinus surgery. Does have a history of allergic rhinitis or allergies. Has had previous allergy testing. They deny a history of aspirin sensitivity, or asthma. They report 0 sinus  infections per year requiring antibiotic treatment since sinus surgery. Most recent antibiotic usage includes: Doxycycline BID X 7 days for URI. She did feel better after this. Smokes Marijuana daily.     History of prior nasal/sinus surgery or procedure: Sinus surgery by Dr. Barcenas in 2015 or 2016. Believes it was only bilateral maxillary sinuses operated on.     I have also reviewed prior note from Dr. Charli Casisdy dated 3/11/24 and this is contributing to my history and assessment.     I personally reviewed the patients CT scan images and results from 12/11/2023: imaging not available at this time. Report states Mild focal mucosal thickening involves the right sphenoid sinus.  Trace secretions/fluid in the right sphenoid sinus.  Mild left frontal sinus mucosal thickening is seen. The skull base and imaged soft tissues are unremarkable.     Review of Systems  Review of systems is negative for constitutional, ophthalmological, cardiac, pulmonary, renal, gastrointestinal, musculoskeletal, mental health, endocrine, or neurologic disorders (except as listed in the HPI, PMH, and Problem List).     Objective   Physical Exam  CONSTITUTIONAL: Vital signs reviewed. Patient appears well developed and well nourished.   GENERAL: this is a healthy appearing female who appears stated age. The patient is alert and appropriately verbally conversant with hoarseness. This patient is in no apparent distress.   FACE: The face was inspected and no cutaneous masses or lesions were visualized. There was no erythema or edema noted. Facial movement was symmetric. No skin lesions were detected. There was no sinus tenderness elicited. TMJ crepitus present.   EYES: Extra-ocular muscle function was intact. No nystagmus was observed. Pupils were equal.   CRANIAL NERVES: Cranial nerves II, III, IV, and VI were noted to be intact via extra-ocular muscle movement testing. Cranial nerve VII noted to be intact and symmetric by facial movement.  Cranial nerves IX and X noted to be intact by gag reflex and palatal movement. Cranial nerve XII noted to be intact by active and symmetric tongue movement.   NOSE: Examination of the nose revealed the nasal dorsum to be midline. Intranasal exam reveals the septum is midline. The inferior turbinates were hypertrophic. No masses, polyps, mucopus, or other lesion on anterior rhinoscopy. See below procedure note as applicable for further exam.  ORAL CAVITY: Examination of the oral cavity revealed no mass lesions nor infection. The palate was noted to be intact. The tongue exhibited normal mobility. Mucosa was moist without lesion. The lips were free of lesion. Gums were free of inflammation. Dentition: normal without obvious infection or inflammation  OROPHARYNX: The oral pharynx was free of mass lesion or mucosal abnormality. The palate was noted to be without lesion. The uvula was normal appearing. The tonsils were Absent.  EARS: Examination of the ears revealed that the auricles were normally formed with no lesions. The external auditory canals were normal. The tympanic membranes were intact.  There is no inflammation visualized.   NECK: Visualization and palpation of the neck revealed no mass lesions. No skin lesions or inflammatory processes were detected. The cervical musculature was normal to palpation.   CERVICAL LYMPHATICS: There were no palpable lymph nodes in the posterior triangle, submandibular triangle, jugulodigastric region, or central neck.  RESPIRATORY: Normal inspiration and expiration and chest wall expansion, no use of accessory muscles to breathe, no stridor.  NEUROLOGICAL: Patient is ambulatory without assist. Mentation is clear. Answering questions appropriately.     Nasal / sinus endoscopy    Date/Time: 4/2/2024 11:25 AM    Performed by: CLIFF Laughlin  Authorized by: CLIFF Laughlin    Consent:     Consent obtained:  Verbal    Consent given by:  Patient    Risks  discussed:  Bleeding, infection and pain    Alternatives discussed:  No treatment, observation and delayed treatment  Procedure details:     Indications: assessment of airway and sino-nasal symptoms      Medication:  Afrin and Jay-Synephrine 2%    Instrument: flexible fiberoptic nasal endoscope      Scope location: bilateral nare    Post-procedure details:     Patient tolerance of procedure:  Tolerated well, no immediate complications  Comments:      Findings: After topical decongestion with decongestant and anesthetic spray, nasal endoscopy was performed using an endoscope. The septum was midline. The inferior turbinates were hypertrophic.  The middle turbinates appeared healthy, the middle meatus is free of purulence, masses, lesions or polyps. There is a patent maxillary and partial ethmoid bilaterally. Both patent without purulence or edema. The superior meatus and sphenoethmoid recess are clear bilaterally. The nasal passageway is patent. The nasopharynx was clear. On view of the larynx, pooling of secretions in the posterior pharynx. There is edema to the true vocal cords and a vocal cord gap with slight paresis of the left true vocal cord. There were no complications and the patient tolerated the procedure well.        Assessment/Plan     Rosibel Staton is a 38 y.o. year old female with symptoms and clinical findings consistent with chronic rhinitis and history of hoarseness. Hoarseness exacerbated by recent illness, marijuana use and scope exam revealing of edema to the true vocal cords and a left vocal cord gap. Appears to be a slight left vocal cord paresis, potentially secondary to prior lithium use.      Plan:  Nasal endoscopy: Findings: septum midline, patent maxillary bilaterally without purulence. Vocal cord edema, left vocal cord gap  I discussed the findings the patient and offered reassurance and counseling.  We agreed to proceed with therapeutic measures to address the issues noted above.   1.  We will have the patient continue a steroid nasal spray to help improve nasal symptoms. Prescription for fluticasone was given. I recommended the patient trial this for at least 1 month. They were instructed to use the spray 2 puffs in each nostril daily. She was also instructed on the appropriate use of the medication, by point it towards the lateral wall of the nose/corner of the eye on the same side.    2. I recommended the patient begin speech therapy for her dysphonia. A referral was placed today.  3. Lastly, following speech therapy, I recommended a follow up with Dr. Joseph North for procedures, such as injections if applicable.     All questions were answered and patient agrees with established plan of care.

## 2024-04-03 ENCOUNTER — TELEMEDICINE (OUTPATIENT)
Dept: PRIMARY CARE | Facility: CLINIC | Age: 39
End: 2024-04-03
Payer: MEDICARE

## 2024-04-03 DIAGNOSIS — F17.210 CIGARETTE NICOTINE DEPENDENCE WITHOUT COMPLICATION: ICD-10-CM

## 2024-04-03 DIAGNOSIS — F17.200 SMOKING: Primary | ICD-10-CM

## 2024-04-03 PROCEDURE — 99214 OFFICE O/P EST MOD 30 MIN: CPT | Performed by: STUDENT IN AN ORGANIZED HEALTH CARE EDUCATION/TRAINING PROGRAM

## 2024-04-03 RX ORDER — MICONAZOLE NITRATE 2 %
2 CREAM (GRAM) TOPICAL AS NEEDED
Qty: 100 EACH | Refills: 0 | Status: SHIPPED | OUTPATIENT
Start: 2024-04-03 | End: 2024-05-14 | Stop reason: WASHOUT

## 2024-04-03 RX ORDER — IBUPROFEN 200 MG
1 TABLET ORAL EVERY 24 HOURS
Qty: 30 PATCH | Refills: 0 | Status: SHIPPED | OUTPATIENT
Start: 2024-04-03 | End: 2024-05-14 | Stop reason: WASHOUT

## 2024-04-03 ASSESSMENT — PATIENT HEALTH QUESTIONNAIRE - PHQ9
SUM OF ALL RESPONSES TO PHQ9 QUESTIONS 1 AND 2: 0
1. LITTLE INTEREST OR PLEASURE IN DOING THINGS: NOT AT ALL
2. FEELING DOWN, DEPRESSED OR HOPELESS: NOT AT ALL

## 2024-04-03 NOTE — PROGRESS NOTES
Subjective   Patient ID: Rosibel Staton is a 38 y.o. female who presents for smoking cessation.  Today she is accompanied by alone.     HPI  Smoking cessation/hoarse voice/nasal congestion  Patient was referred to an ENT for voice hoarseness that has worsened in the past 6 months  She saw the ENT nurse practitioner on 4/2/23 and was told she had L vocal cord paresis and inflammation in the throat  She was referred to start speech therapy and to follow up with a throat specialist Dr. Joseph North for possible surgery and/or injections  Currently smokes 1ppd for the past 8 months  She was smoking 10 cigarettes a day prior to that for 2 years  Patient wants to stop smoking  She has tried various over the counter medications including patch, gums and lozenges with no success  She said that she does not want to go on Chantix or Wellbutrin as it makes her suicidal  Wants prescription patch and gum     Current Outpatient Medications on File Prior to Visit   Medication Sig Dispense Refill    acetaminophen (TylenoL) 325 mg tablet Take by mouth.      ARIPiprazole (Abilify) 15 mg tablet Take 1 tablet (15 mg) by mouth once daily. 90 tablet 3    atorvastatin (Lipitor) 40 mg tablet Take 1 tablet (40 mg) by mouth once daily at bedtime. 90 tablet 3    azelastine (Astelin) 137 mcg (0.1 %) nasal spray Administer 1 spray into each nostril 2 times a day. Use in each nostril as directed 30 mL 1    clonazePAM (KlonoPIN) 1 mg tablet Take 1 tablet (1 mg) by mouth 3 times a day as needed for anxiety. 90 tablet 2    estradiol (Vagifem) 10 mcg tablet vaginal tablet Insert 1 tablet (10 mcg) into the vagina 2 times a week. Insert one tablet into the vagina daily for 14 days then continue using twice weekly 34 tablet 3    fluticasone (Flonase) 50 mcg/actuation nasal spray Administer 1 spray into each nostril 2 times a day. Shake gently. Before first use, prime pump. After use, clean tip and replace cap. 16 g 1    hydrOXYzine pamoate  (Vistaril) 50 mg capsule Take 1 capsule (50 mg) by mouth every 6 hours if needed for anxiety. 90 capsule 11    ibuprofen 200 mg tablet Take by mouth.      metaxalone (Skelaxin) 800 mg tablet Take 1 tablet (800 mg) by mouth 3 times a day as needed.      nortriptyline (Pamelor) 10 mg capsule Take 2 capsules (20 mg) by mouth once daily at bedtime.      ondansetron (Zofran) 4 mg tablet Take 2 tablets (8 mg) by mouth every 12 hours if needed for nausea. 60 tablet 0    propranolol (Inderal) 10 mg tablet Take 1 tablet (10 mg) by mouth 2 times a day as needed (Anxiety). 60 tablet 11    rizatriptan MLT (Maxalt-MLT) 10 mg disintegrating tablet Take 1 tablet (10 mg) by mouth 1 time if needed for migraine. May repeat in 2 hours if unresolved. Do not exceed 30 mg in 24 hours. 9 tablet 2    SUMAtriptan (Imitrex) 50 mg tablet Take 1 tablet (50 mg) by mouth 1 time if needed for migraine. May repeat after 2 hours. 30 tablet 0     No current facility-administered medications on file prior to visit.        Allergies   Allergen Reactions    Nortriptyline Psychosis    Amoxicillin-Pot Clavulanate Unknown    Azithromycin Unknown    Bacitracin Zinc-Polymyxin B Unknown    Ciprofloxacin Unknown    Citalopram Unknown    Duloxetine Unknown    Gabapentin Unknown    Gadolinium-Containing Contrast Media Unknown    Lithium Unknown    Metaxalone Unknown    Nickel Unknown    Nitrofurantoin Monohyd/M-Cryst Unknown    Prednisone Unknown    Quetiapine Unknown    Scopolamine Unknown    Sertraline Unknown    Sulfamethoxazole-Trimethoprim Unknown    Tramadol Unknown       Immunization History   Administered Date(s) Administered    Tdap vaccine, age 7 year and older (BOOSTRIX, ADACEL) 01/15/2014, 10/19/2015         Review of Systems  All pertinent positive symptoms are included in the history of present illness.  All other systems have been reviewed and are negative and noncontributory to this patient's current ailments.     Objective   There were no  vitals taken for this visit.  BSA: There is no height or weight on file to calculate BSA.  No visits with results within 1 Month(s) from this visit.   Latest known visit with results is:   Telemedicine on 02/26/2024   Component Date Value Ref Range Status    Flu A Result 02/26/2024 Not Detected  Not Detected Final    Flu B Result 02/26/2024 Not Detected  Not Detected Final    Coronavirus 2019, PCR 02/26/2024 Not Detected  Not Detected Final    RSV PCR 02/26/2024 Not Detected  Not Detected Final       Physical Exam  CONSTITUTIONAL - well nourished, well developed, looks like stated age, in no acute distress, not ill-appearing, and not tired appearing  SKIN - normal skin color and pigmentation, normal skin turgor without rash, lesions, or nodules visualized  HEAD - no trauma, normocephalic  EYES - normal external exam  CHEST -no distressed breathing, good effort  EXTREMITIES - no edema, no deformities  NEUROLOGICAL - normal balance, normal motor, no ataxia  PSYCHIATRIC - alert, pleasant and cordial, age-appropriate    Assessment/Plan   Smoking cessation/hoarse voice/nasal congestion  I have sent in prescriptions for nicotine patch and gum   I have also sent in a referral for tobacco cessation counseling  We also discussed laser therapy as a possible aid in helping with smoking cessation  Please continue to follow up with speech therapy and Dr[facility] of [facility]    Thank you for letting us be a part of your care team  If you have any questions or concerns, please contact the office  Otherwise, please follow-up in one month to let me know how you are doing

## 2024-04-08 NOTE — PROGRESS NOTES
Subjective     Rosibel Sttaon is a  38 y.o. female  with a past medical history of cervical radiculitis, chronic migraine, anxiety, JEREMIAH, fatigue, depression, endometriosis, HLD, MDD, nicotine use, marijuana use, PTSD, syncope, vit D def who presents with   Chief Complaint   Patient presents with    Migraine    Concussion     Visit type: follow up visit    HPI   Last seen by Dr. Sanders on 2/12/24. At that time, she was establishing care for migraines. He notes that her migraines started at age 2, generally without aura. Migraines did improve some around age 32 s/p hysterectomy, but when she got a concusssion in 12/2023 her mirgaines worsened again. At that time, were at 1-2 per week with photo/phonophobia, nausea. Taking Zofran for nausea, sumatriptan for rescue though sumatriptan not helpful. Propranolol 3x per day for anxiety from July-Dec and migraines were less frequent. He restarted propranolol 20 mg TID and tried rizatriptan 10 mg PRN for rescue.     4/9/24:  Since then, she has been about the same. She has stopped ibuprofen and tylenol all together. 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 She is seeing a specialist on Thursday and doing speech therapy. Tried Botox once but then had to discontinue due to other health problems. OT has been huge for her. She is starting massotherapy next week. She is also taking 20 mg of melatonin. Has not tried rizatriptan yet.    Location: occipital or frontal, bilateral  Frequency: 3-5 headaches per week, 15-20 month  Severity: 9/10 at first, 6/10 average  Aura: no   Associated sx: (+) n/v, blurry vision, photosensitivity, phonosensitivity, dizziness, double vision when its very bad, no loss of vision  Aggravating/alleviating factors: sunglasses, ice, heat, stretching help, sleep does not. Too much caffeine.   Triggers: Stress; Not brought on by coughing, sneezing, bearing down, lying flat, intense exercise, sex  Last eye check: two months ago   Family  history of migraines: yes, aunt, grandma, mom   History of Kidney stones? yes  History of glaucoma? no  History of heart problems or arrhytmias? no  History of asthma? no  History of DM? no    Work attendance or other daily activities affected: yes - works in [employer] and [employer] at [employer].    Current Acute Headache Treatment Rizatriptan    Current Preventative Headache Treatment Propranolol     Previous Acute Headache Treatment Sumatriptan    Previous Preventative Headache Treatment Nortiptyline  Topiramate   Amitriptyline   Effexor  Lamictal        Review of Systems  10 point review of systems performed and is negative except as noted in the HPI.     All other systems have been reviewed and are negative for complaint.    Past Medical History:   Diagnosis Date    Cough, unspecified 04/27/2018    Cough    Other cervical disc displacement, unspecified cervical region 10/03/2016    Cervical disc herniation    Personal history of other diseases of the circulatory system     History of hypertension    Personal history of other diseases of the digestive system     History of esophageal reflux    Personal history of other diseases of the musculoskeletal system and connective tissue 05/04/2016    History of herniated intervertebral disc    Personal history of other diseases of the respiratory system 01/06/2017    History of deviated nasal septum    Personal history of other diseases of urinary system     History of bladder problems    Personal history of other infectious and parasitic diseases 03/05/2020    History of candidiasis of mouth    Personal history of other mental and behavioral disorders     History of depression    Personal history of urinary (tract) infections 02/24/2021    History of urinary tract infection    Snoring     Snoring       Family medical history includes stroke in father.    Past Surgical History:   Procedure Laterality Date    OTHER SURGICAL HISTORY  10/24/2016    Drainage Of Pelvic Abscess    OTHER  SURGICAL HISTORY  05/25/2022    Oophorectomy bilateral    OTHER SURGICAL HISTORY  07/16/2019    Laparoscopic hysterectomy    OTHER SURGICAL HISTORY  01/06/2017    Abdominal Surgery       Social History     Substance and Sexual Activity   Drug Use Yes    Types: Marijuana    Comment: medical marijuana     Tobacco Use: High Risk (4/9/2024)    Patient History     Smoking Tobacco Use: Every Day     Smokeless Tobacco Use: Never     Passive Exposure: Not on file     Alcohol Use: Not on file           Objective     Neurological Exam  Mental Status  Awake, alert and oriented to person, place and time. Oriented to person, place, time and situation. Recent and remote memory are intact. Speech is normal. Language is fluent with no aphasia. Attention and concentration are normal. Fund of knowledge is appropriate for level of education. Apraxia absent.  Wearing sunglasses .    Cranial Nerves  CN III, IV, VI: Extraocular movements intact bilaterally. Normal lids and orbits bilaterally. Pupils equal round and reactive to light bilaterally.  CN VII: Full and symmetric facial movement.  CN VIII: Hearing is normal.    Motor  Normal muscle bulk throughout. No fasciculations present. Normal muscle tone. No abnormal involuntary movements. Strength is 5/5 throughout all four extremities.    Gait  Normal casual, toe, heel and tandem gait.            Results for orders placed or performed in visit on 03/16/22   MR CERVICAL SPINE WO IV CONTRAST    Narrative    MRN: [id number]  Patient Name: PEEWEE MOSER     STUDY:  MRI CERVICAL WO;  3/16/2022 11:25 am     INDICATION:  neck pain w/ radiculopathy  M54.2: Neck pain M54.12: Cervical  radiculitis.     COMPARISON:  CT of the cervical spine dated 02/17/2022; MRI of the cervical spine  dated 05/03/2016;     ACCESSION NUMBER(S):  [id number]     ORDERING CLINICIAN:  ANTE PLETIKOSIC     TECHNIQUE:  Sagittal T1, T2, STIR, axial T1 and axial T2 weighted images were  acquired through the cervical spine.   No intravenous gadolinium  contrast was administered.     FINDINGS:  Evaluation is somewhat degraded due to patient motion.     There is reversal of normal lordotic curvature of the cervical spine.  There is 2 mm retrolisthesis of C5 on C6, unchanged since the recent  CT but slightly increased since the prior MRI.     Vertebral body heights are normal. There is mild hyperintense STIR  signal at the C5-C6 endplate posteriorly, likely representing  degenerative edema or hypervascularity, otherwise marrow signal is  within normal limits.     There is multilevel intervertebral disc desiccation with  mild-to-moderate disc height loss at C5-C6.     Cervical spinal cord is normal in signal and caliber.     Craniocervical junction is intact.     C2-C3: No significant posterior disc contour abnormality. No spinal  canal or neural foraminal stenosis. There is stable moderate right  facet osteoarthropathy.     C3-C4: There is no striking posterior disc contour abnormality. There  is no spinal canal stenosis or neural foraminal narrowing. There is  no facet osteoarthropathy.     C4-C5: Mild disc bulge and endplate spurring slightly impress on the  ventral subarachnoid space, without significant spinal canal  stenosis, unchanged. Hypertrophic uncovertebral joint changes  encroach on the neural foramina bilaterally without significant  stenosis. There is no facet osteoarthropathy.     C5-C6: Mild disc bulge, eccentric to the left, and endplate spurring  efface the ventral subarachnoid space and cause mild spinal canal  narrowing, similar in appearance to prior study. There is stable mild  right and mild-to-moderate left neural foraminal narrowing due to  uncovertebral hypertrophy and possible disc extension into the neural  foramina. There is no facet osteoarthropathy.     C6-C7: No significant posterior disc contour abnormality, no spinal  canal or neural foraminal stenosis. There is no facet  osteoarthropathy.     C7-T1: No  significant posterior disc contour abnormality, spinal  canal or neural foraminal stenosis. There is no facet  osteoarthropathy.     Visualized upper thoracic spine is normal in appearance.     Prevertebral and paraspinal soft tissues are unremarkable in  appearance.     IMPRESSION:  Mild multilevel degenerative changes of the cervical spine, without  evidence of high-grade spinal canal stenosis, are similar to prior  exam from May 2016.     I personally reviewed the images/study and Dr. Ledbetter's  interpretation and I agree with the findings as stated.   Results for orders placed or performed in visit on 12/01/21   XR CERVICAL SPINE COMPLETE 4-5 VIEWS    Narrative    MRN: [id number]  Patient Name: PEEWEE MOSER     STUDY:  SPINE, CERVICAL  MIN 4 VIEWS; ;  12/1/2021 9:46 am     INDICATION:  neck pain w/ cerv radiculopathy  M54.2: Neck pain M62.838: Neck  muscle spasm.     COMPARISON:  November 21, 2016 cervical spine radiographs.     ACCESSION NUMBER(S):  [id number]     ORDERING CLINICIAN:  ANTE PLETIKOSIC     FINDINGS:  No detected fracture, subluxation or suspicious osseous lesion. Mild  spondylosis and degenerative disc changes C4-5 and C5-6 appears  slightly more conspicuous. No unusual paravertebral soft tissue  abnormalities.     IMPRESSION:  Mild cervical spondylosis and degenerative disc changes, mildly  progressed.      Results for orders placed or performed in visit on 06/11/18   CT HEAD WO IV CONTRAST    Narrative    MRN: [id number]  Patient Name: PEEWEE MOSER     STUDY:  CT HEAD WO CONTRAST; 6/11/2018 4:32 pm     INDICATION:  Signs/Symptoms: headache.     COMPARISON:  CT Brain, 8 June 2018     ACCESSION NUMBER(S):  [id number]     ORDERING CLINICIAN:  PHILIP BOLDEN     TECHNIQUE:  CT of the brain from the skull vertex to the skull base, without  intravenous contrast.     FINDINGS:  Acute intracranial hemorrhage: Negative  Acute subdural hematoma: Negative     Acute intracranial mass effect: Negative      CT evidence of acute / subacute territorial ischemia: Negative     Ventricles: Normal caliber and configuration     Other brain findings: No additional findings to note     Included paranasal sinuses: Mucosal thickening in the left maxillary;  remainder clear  Included mastoid air cells: All clear     Skull: No lytic or blastic lesion     Extracranial soft tissues: Scalp and occular globes grossly normal by  CT     IMPRESSION:  NO ACUTE INTRACRANIAL PROCESS   Results for orders placed or performed in visit on 06/07/18   MR BRAIN W AND WO IV CONTRAST    Narrative    MRN: [id number]  Patient Name: PEEWEE MOSER     STUDY:  MRI BRAIN W/WO CONTRAST; 6/7/2018 5:45 pm     INDICATION:  Signs/Symptoms: Balance issues and falling.     COMPARISON:  MRI brain from 11/05/2015     ACCESSION NUMBER(S):  [id number]     ORDERING CLINICIAN:  JACE SNYDER     TECHNIQUE:  Axial T2, FLAIR, DWI and T1 weighted images of brain were acquired.  Post contrast T1 weighted images were acquired after administration  of 9 cc MultiHance gadolinium based intravenous contrast.     FINDINGS:  CSF Spaces: The ventricles, sulci and basal cisterns are within  normal limits.     Parenchyma: There is no diffusion restriction abnormality to suggest  acute ischemia. There is no focal parenchymal signal abnormality.  There is no mass effect or midline shift. There is no focus of  abnormal parenchymal enhancement.     Paranasal Sinuses and Mastoids: There is moderate mucosal thickening  within the left maxillary sinus and left-sided ethmoidal air cells.  Bilateral mastoids are clear.     IMPRESSION:  Unremarkable MRI of brain.     Chronic left maxillary and ethmoidal sinus inflammatory changes.     The study was interpreted at [facility].              Assessment/Plan   Problem List Items Addressed This Visit       Chronic migraine without aura without status migrainosus, not intractable - Primary    Overview     On  propranolol for prevention and rizatriptan for rescue. Zofran for nausea.     Has tried Nortiptyline, Topiramate, Amitriptyline,  and  Lamictal          Current Assessment & Plan     Continue propranolol for prevention. Will trial Qulipta 60 mg for prevention. Samples given today. Sent to specialty pharmacy.     Continue rizatriptan for rescue.    Would recommend staying out of work for [employer] the next month while we adjust meds.           Other Visit Diagnoses       Nausea

## 2024-04-09 ENCOUNTER — EVALUATION (OUTPATIENT)
Dept: SPEECH THERAPY | Facility: CLINIC | Age: 39
End: 2024-04-09
Payer: COMMERCIAL

## 2024-04-09 ENCOUNTER — OFFICE VISIT (OUTPATIENT)
Dept: NEUROLOGY | Facility: CLINIC | Age: 39
End: 2024-04-09
Payer: COMMERCIAL

## 2024-04-09 VITALS
DIASTOLIC BLOOD PRESSURE: 60 MMHG | HEIGHT: 65 IN | HEART RATE: 80 BPM | WEIGHT: 135 LBS | SYSTOLIC BLOOD PRESSURE: 98 MMHG | BODY MASS INDEX: 22.49 KG/M2

## 2024-04-09 DIAGNOSIS — R49.0 DYSPHONIA: Primary | ICD-10-CM

## 2024-04-09 DIAGNOSIS — R11.0 NAUSEA: ICD-10-CM

## 2024-04-09 DIAGNOSIS — G43.709 CHRONIC MIGRAINE WITHOUT AURA WITHOUT STATUS MIGRAINOSUS, NOT INTRACTABLE: Primary | ICD-10-CM

## 2024-04-09 PROCEDURE — 99214 OFFICE O/P EST MOD 30 MIN: CPT

## 2024-04-09 PROCEDURE — 92524 BEHAVRAL QUALIT ANALYS VOICE: CPT | Mod: GN | Performed by: SPEECH-LANGUAGE PATHOLOGIST

## 2024-04-09 RX ORDER — ATOGEPANT 60 MG/1
60 TABLET ORAL DAILY
Qty: 30 TABLET | Refills: 11 | Status: SHIPPED | OUTPATIENT
Start: 2024-04-09 | End: 2024-04-24 | Stop reason: SDUPTHER

## 2024-04-09 RX ORDER — ONDANSETRON HYDROCHLORIDE 8 MG/1
8 TABLET, FILM COATED ORAL EVERY 12 HOURS PRN
Qty: 20 TABLET | Refills: 11 | Status: SHIPPED | OUTPATIENT
Start: 2024-04-09

## 2024-04-09 ASSESSMENT — PAIN SCALES - GENERAL: PAINLEVEL_OUTOF10: 0 - NO PAIN

## 2024-04-09 ASSESSMENT — PAIN - FUNCTIONAL ASSESSMENT: PAIN_FUNCTIONAL_ASSESSMENT: 0-10

## 2024-04-09 NOTE — PROGRESS NOTES
Speech-Language Pathology    Voice Evaluation    Patient Name: Rosibel Staton  MRN: [id number]  Today's Date: 4/9/2024     Time Calculation  Start Time: 0930  Stop Time: 1015  Time Calculation (min): 45 min      Current Problem:  Patient Active Problem List   Diagnosis    Abnormal blood chemistry    Abnormal uterine bleeding    Anovulation    Arm pain    Arthralgia of right wrist    Bacterial overgrowth syndrome    Bacterial vaginosis    Balance disorder    Body aches    Breast pain, left    Bruising    Cervical facet syndrome    Cervical radiculitis    Chronic left upper quadrant pain    Chronic sinus infection    Contact dermatitis    Cough    Dense breast tissue on mammogram    Dysuria    Endometriosis    Eye irritation    Fall from standing    Closed nondisplaced fracture of middle third of scaphoid bone of right wrist    Fatigue    Feeling faint    Foot sprain, right, initial encounter    Generalized anxiety disorder    Headache    Hyperlipidemia    Hypotension    Kidney stones, calcium oxalate    Left breast mass    Lymphadenopathy    Marijuana use    Moderate episode of recurrent major depressive disorder (CMS/HCC)    Mood changes    Neck muscle spasm    Nausea in adult    Neck pain    Nephrolithiasis    Nicotine dependence    Oral thrush    Pain, flank, bilateral    Palpitations    Pelvic pain in female    Possible urinary tract infection    Post-operative pain    PTSD (post-traumatic stress disorder)    Right foot pain    Right shoulder pain    Seasonal allergies    Skin infection    Sore throat    Stye    Syncope    UTI (urinary tract infection)    Vasomotor symptoms due to menopause    Vitamin D deficiency    Weight loss    Wound cellulitis    Yeast infection    Hordeolum externum of right upper eyelid    UTI symptoms    Possible exposure to STD    Chronic migraine with aura    Depression    Chronic daily headache    Chronic migraine without aura without status migrainosus, not intractable    Cervical  paraspinal muscle spasm    Acute ankle pain    Anxiety    Calcium oxalate calculus    Disease due to severe acute respiratory syndrome coronavirus 2 (SARS-CoV-2)    History of depression    History of hypertension    Anomia    Dysphonia       Voice Assessment:    Rosibel Staton is a 38 y.o female who presents for an initial voice evaluation. Onset of hoarseness and breathy vocal quaility began 6 months s/p concussion.  States her voice is noticeably lower in pitch and rapsy. Has difficulty maintain conversation for long periods of time due to increased vocal effort. She is a smoker, states that she smokes 10-15 cigarettes a day. However, she plans to gradually quit within the next few weeks. Has tried both nicotine patch and gum with no success. Patient is scheduled to start cosuneling this week for nicitione avoidance strategies. Reports difficulty swallowing smaller foods particles (e.g. rice and ground meat). States food particles get stuck on left side of throat requiring a liquid wash. She would like to consider MBSS.     Initiated vocal wellness program this date. Dicussed reflux management (e.g. gaviscon/seltzer water) techniques to manage mucus. We also discussed increasing hydration via water intake, humidification, and nasal saline sprays. Patient plans to be seen in clinic by  on Thursday. Will assess need for continuance of speech therapy at that time.      Voice Plan of Care:  Frequency: every other week  Duration: 6 weeks  Number of Visits: 3  Recommendations for therapeutic interventions: Vocal hygiene program  Prognosis: Fair  Factors affecting prognosis: Motivation  Discussed Plan of Care: Patient, Discussed risks/benefits with patient/caregiver    Subjective     Per ENT Visit (4/2/24):    Rosibel Staton is a 38 y.o. old female, presenting with complaints of sinus and nose problems that began about 1-2 months ago when she had an URI. She first noticed her voice change in 2018 after  starting lithium. She has not been on lithium for a couple of years, her voice never improved after stopping but she has noticed it has worsened over the past six months. The patient describes the sinus/nose problems as sudden  onset of rhinitis (bilateral, clear) and constant throat clearing. Previously had nasal congestion in which she used Flonase nasal spray. Patient denies facial pressure, facial pain, periorbital edema, loss of taste, and loss of smell. The patient denies epistaxis. The patient has taken Azelastine and Flonase nasal spray beginning in February and ending in March of 2024. This helped and nasal congestion / blockage has resolved. Feels nasal drainage and hoarseness remain unchanged. Has previously used nasal saline irrigations years ago after sinus surgery. Does have a history of allergic rhinitis or allergies. Has had previous allergy testing. They deny a history of aspirin sensitivity, or asthma. They report 0 sinus infections per year requiring antibiotic treatment since sinus surgery. Most recent antibiotic usage includes: Doxycycline BID X 7 days for URI. She did feel better after this. Smokes Marijuana daily.      History of prior nasal/sinus surgery or procedure: Sinus surgery by Dr. Barcenas in 2015 or 2016. Believes it was only bilateral maxillary sinuses operated on.     General Visit Information:  Patient Class: Outpatient  Living Environment: Home  Arrival: Independent  Reason for Referral: dysphonia s/p concussion  Referred By: Kimberly Bar  Past Medical History Relevant to Rehab: smoker, reflux, sinusitis  Patient Seen During This Visit: Yes    Objective     Pain Assessment:  Pain Assessment: 0-10  Pain Score: 0 - No pain    Patient Self Assessment:  Daily water intake: No  Daily caffeine intake: No  Alcohol intake: Yes  Smoking history: Yes  Reflux history: Yes    Voice Objective:  Motor Speech Production: WFL  Pitch: Too low  Voice Quality: Harsh, Breathy  Fluency:  WFL  Prosody: WFL  Resonance: WFL  Loudness: Inadequate range      Voice Analysis:    Flexible Laryngoscopy completed (4/2/24):    Demonstrates edema to the true vocal cords and a left vocal cord gap. Appears to be a slight left vocal cord paresis, potentially secondary to prior lithium use. Patient demonstrates low pitch, harsh, and breathy vocal quality this date.    Voice Treatment:  Individual(s) Educated: Patient  Verbal Education: Risks/benefits of therapy, Exercises  Written Education Provided: Exercises, Other: (comment) (cough/throat supression techniques)  Response to Education: Verbalized understanding  Patient's Learning Preference(s): Explanation/discussion  Patient/Caregiver Verbalized Understanding and Agreement: Yes    OP Education:  Adult Outpatient Education  Individual(s) Educated: Patient  Risk and Benefits Discussed with Patient/Caregiver/Other: yes  Patient/Caregiver Demonstrated Understanding: yes  Plan of Care Discussed and Agreed Upon: yes  Patient Response to Education: Patient/Caregiver Verbalized Understanding of Information       Active       Voice        SLP Goal 1       Start:  04/09/24    Expected End:  08/09/24       Patient will increase vocal wellness and decrease phonotrauma in adherence with clinician prescribed vocal hygiene and wellness program per patient report.          SLP Goal 2       Start:  04/09/24    Expected End:  08/09/24       Patient will increase ability to produce voice without tension within 5 minute conversational task x80% accuracy as judged by clinician observation and/or patient report.          SLP Goal 3       Start:  04/09/24    Expected End:  08/09/24       Patient will demonstrate independent use of voice/breathing techniques x80% accuracy.           EVETTE HALL-SLP     This note was generated by speech therapy  and personally reviewed by acting SLP supervisor listed below.

## 2024-04-09 NOTE — PATIENT INSTRUCTIONS
Start qulipta 60 mg daily.   Continue propranolol.   Continue rizatriptan for rescue. We discussed the risk of nicotine use with triptans.   I feel it is reasonable for you to stay off of work for at least the next month while we adjust migraine medications.       Suggestions for preventing or controlling migraines:  - Riboflavin (vitamin B2) 200 mg twice a day and magnesium (glycinate or oxelate) 250-300 mg daily for prevention  - CoQ10 100 mg daily increasing to 400 mg daily may also be helpful  - avoid triggers that can cause or worsen migraines (food, lack of sleep, stress, etc.)  - keep a diary of your headaches to note how often you get them, how long they last, and any other helpful information   - avoid taking abortive medication (NOT preventative medication) more than 3 days a week (includes both prescription and over the counter meds)  - take your preventative medication as directed. Let me know if you have side effects or problems with the medication. Do not suddenly stop the medication.  - melatonin 3 mg at night can help with sleep and headaches.

## 2024-04-09 NOTE — ASSESSMENT & PLAN NOTE
Continue propranolol for prevention. Will trial Qulipta 60 mg for prevention. Samples given today. Sent to specialty pharmacy.     Continue rizatriptan for rescue.    Would recommend staying out of work for [employer] the next month while we adjust meds.

## 2024-04-10 ENCOUNTER — SPECIALTY PHARMACY (OUTPATIENT)
Dept: PHARMACY | Facility: CLINIC | Age: 39
End: 2024-04-10

## 2024-04-11 ENCOUNTER — TELEMEDICINE CLINICAL SUPPORT (OUTPATIENT)
Dept: CARDIAC REHAB | Facility: CLINIC | Age: 39
End: 2024-04-11
Payer: COMMERCIAL

## 2024-04-11 ENCOUNTER — OFFICE VISIT (OUTPATIENT)
Dept: OTOLARYNGOLOGY | Facility: CLINIC | Age: 39
End: 2024-04-11
Payer: MEDICARE

## 2024-04-11 VITALS — WEIGHT: 130 LBS | BODY MASS INDEX: 20.89 KG/M2 | HEIGHT: 66 IN

## 2024-04-11 DIAGNOSIS — R09.A2 GLOBUS PHARYNGEUS: ICD-10-CM

## 2024-04-11 DIAGNOSIS — R49.8 VOICE FATIGUE: ICD-10-CM

## 2024-04-11 DIAGNOSIS — R49.0 MUSCLE TENSION DYSPHONIA: ICD-10-CM

## 2024-04-11 DIAGNOSIS — J38.1 REINKE'S EDEMA OF VOCAL FOLDS: ICD-10-CM

## 2024-04-11 DIAGNOSIS — R49.0 VOICE HOARSENESS: Primary | ICD-10-CM

## 2024-04-11 DIAGNOSIS — J38.7 LEUKOPLAKIA OF LARYNX: ICD-10-CM

## 2024-04-11 DIAGNOSIS — F17.200 SMOKING: ICD-10-CM

## 2024-04-11 DIAGNOSIS — F17.210 CIGARETTE NICOTINE DEPENDENCE WITHOUT COMPLICATION: ICD-10-CM

## 2024-04-11 PROCEDURE — 99407 BEHAV CHNG SMOKING > 10 MIN: CPT | Mod: 95 | Performed by: INTERNAL MEDICINE

## 2024-04-11 PROCEDURE — 31579 LARYNGOSCOPY TELESCOPIC: CPT | Performed by: OTOLARYNGOLOGY

## 2024-04-11 PROCEDURE — 99215 OFFICE O/P EST HI 40 MIN: CPT | Performed by: OTOLARYNGOLOGY

## 2024-04-11 ASSESSMENT — PATIENT HEALTH QUESTIONNAIRE - PHQ9
10. IF YOU CHECKED OFF ANY PROBLEMS, HOW DIFFICULT HAVE THESE PROBLEMS MADE IT FOR YOU TO DO YOUR WORK, TAKE CARE OF THINGS AT HOME, OR GET ALONG WITH OTHER PEOPLE: VERY DIFFICULT
1. LITTLE INTEREST OR PLEASURE IN DOING THINGS: SEVERAL DAYS
2. FEELING DOWN, DEPRESSED OR HOPELESS: SEVERAL DAYS
SUM OF ALL RESPONSES TO PHQ9 QUESTIONS 1 AND 2: 2

## 2024-04-11 NOTE — PROGRESS NOTES
ASSESSMENT AND PLAN:   Rosibel Staton is a 38 y.o. female with a history of voice changes with findings on stroboscopy suggestive of an ulcerative laryngitis.      She has leukoplakia bilaterally with some centralized erythema on the left.  Has some Yandel's Edema changes on the superior right vocal cord more towards the posterior half.  We had a discussion in regards to trial of antifungal that she has had antibiotics recently for sinusitis or consideration of a treatment in the operating room with KTP laser ablation.  Discussing the risks, benefits and alternatives she wishes to proceed with a KTP laser ablation in the OR.  This will allow us to perform a biopsy as well as treat the Yandel's Edema and leukoplakia/ulcerative laryngitis.  She is also working on smoking cessation.  She would like to do this case after her 39th birthday which occurs on [birth year].               Reason For Consult  No chief complaint on file.       HISTORY OF PRESENT ILLNESS:  Rosibel Staton is a 38 y.o. female presenting for a follow up visit with me for hoarseness and left vocal cord paresis.  NPV previously seen by ERVIN Bar.  Hx of chronic rhinitis and hoarseness.  Reports recent illness, marijuana use and scope exam revealing of edema to the true vocal cords and a left vocal cord gap.  Prior lithium use.    + Chronic sinusitis on imaging at [facility].      PMHx: rhinitis, sinusitis, voice changes, cervical radiculitis, chronic migraine, anxiety, JEREMIAH, fatigue, depression, endometriosis, HLD, MDD, nicotine use, marijuana use, PTSD, syncope, vit D def    The patient reports voice changes have been ongoing since a number of events.  She reports that she had some changes when she started lithium in the past.  But had frequent recurrences of voice changes recently.  Had a injury at work recently which she reports also worsened her symptoms.  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    Prior History:   Last seen by CAMILO Bar on 04/02/2024 for symptoms and clinical findings consistent with chronic rhinitis and history of hoarseness. Hoarseness exacerbated by recent illness, marijuana use and scope exam revealing of edema to the true vocal cords and a left vocal cord gap. Appears to be a slight left vocal cord paresis, potentially secondary to prior lithium use.     Hx of chronic rhinitis and hoarseness.  Reports recent illness, marijuana use and scope exam revealing of edema to the true vocal cords and a left vocal cord gap.  Prior lithium use.    + Chronic sinusitis on [facility] at [facility].    PMHx: rhinitis, sinusitis, voice changes, cervical radiculitis, chronic migraine, anxiety, JEREMIAH, fatigue, depression, endometriosis, HLD, MDD, nicotine use, marijuana use, PTSD, syncope, vit D def     Past Medical History  She has a past medical history of Cough, unspecified (04/27/2018), Other cervical disc displacement, unspecified cervical region (10/03/2016), Personal history of other diseases of the circulatory system, Personal history of other diseases of the digestive system, Personal history of other diseases of the musculoskeletal system and connective tissue (05/04/2016), Personal history of other diseases of the respiratory system (01/06/2017), Personal history of other diseases of urinary system, Personal history of other infectious and parasitic diseases (03/05/2020), Personal history of other mental and behavioral disorders, Personal history of urinary (tract) infections (02/24/2021), and Snoring. Surgical History  She has a past surgical history that includes Other surgical history (10/24/2016); Other surgical history (05/25/2022); Other surgical history (07/16/2019); and Other surgical history (01/06/2017).   Social History  She reports that she has been smoking cigarettes. She has never used smokeless tobacco. She reports that she does not currently use  alcohol. She reports current drug use. Drug: Marijuana. Allergies  Nortriptyline, Amoxicillin-pot clavulanate, Azithromycin, Bacitracin zinc-polymyxin b, Ciprofloxacin, Citalopram, Duloxetine, Gabapentin, Gadolinium-containing contrast media, Lithium, Metaxalone, Nickel, Nitrofurantoin monohyd/m-cryst, Prednisone, Quetiapine, Scopolamine, Sertraline, Sulfamethoxazole-trimethoprim, and Tramadol     Family History  Family History   Problem Relation Name Age of Onset    Breast cancer Mother      Stroke Father      Diabetes Father      Breast cancer Maternal Grandmother         Review of Systems  All 10 systems were reviewed and negative except for above.      Last Recorded Vitals  There were no vitals taken for this visit.    Physical Exam  ENT Physical Exam  Constitutional  Appearance: patient appears well-developed and well-nourished,  Head and Face  Appearance: head appears normal and face appears normal;  Ear  Auricles: right auricle normal; left auricle normal;  Nose  External Nose: nares patent bilaterally;  Oral Cavity/Oropharynx  Lips: normal;  Neck  Neck: neck normal; neck palpation normal;  Respiratory  Inspection: no retractions visible;  Cardiovascular  Inspection: no peripheral edema present;  Neurovestibular  Mental Status: alert and oriented;  Psychiatric: mood normal;  Cranial Nerves: cranial nerves intact;          Procedures     Flexible Laryngoscopy w/ Videostroboscopy    VOICE AND SPEECH CHARACTERISTICS:  Normal spoken speech, (+) moderate dysphonia, (+) moderate roughness, (+) mild breathiness, (+) moderate asthenia, (+) moderate strain.    Intelligibility: reduced.   Resonance: balanced.   Vocal Loudness: reduced.   Breath Support: normal.    PROCEDURE:    Indications: voice change  PROCEDURE NOTE: FLEXIBLE LARYNGOSCOPY WITH STROBOSCOPY  I recommended a flexible laryngoscopy with stroboscopy based on PE findings, and/or concern for mucosal wave details based upon history and/or for issues  associated with hyperreflexic gag on mirror exam concerning for pathology. Risks, benefits, and alternatives were explained. The patient wishes to proceed and gives verbal consent.   Patient is seated in the exam chair. After adequate topical anesthesia, I advance the flexible endoscope. The examination included evaluation of the leo, vallecula, base of tongue, pyriforms, post-cricoid area, larynx and immediate subglottis.  Findings : assessment of the nasopharynx, base of tongue/vallecula, pyriform sinuses, post-cricoid area and pharyngeal walls was without lesion or mass, pharyngeal wall contraction is normal and symmetric, and no pooling of secretions  TVC edema: 4 (obstructs)  Gross Arytenoid Movement: symmetric.  Arytenoid Height: normal.   Supraglottic Tension: lateral.  Symmetry: asymmetry.   Amplitude: reduced: bilateral.  Phase Closure: in-phase.  Mucosal Wave Lateral Excursion/Secondary Wave: Bilateral Vocal Cord: moderate restriction - Wave moved up to ¼ the width of the vocal fold.  Periodicity: aperiodic.  Closure: irregular.        Time Spent  Prep time on day of patient encounter: 10 minutes  Time spent directly with patient, family or caregiver: 15 minutes  Additional Time Spent on Patient Care Activities/Discussion with SLP re care plan: 5 minutes  Documentation Time: 10 minutes  Other Time Spent: 5 minutes  Total: 45 minutes

## 2024-04-16 ENCOUNTER — TELEMEDICINE CLINICAL SUPPORT (OUTPATIENT)
Dept: CARDIAC REHAB | Facility: HOSPITAL | Age: 39
End: 2024-04-16
Payer: COMMERCIAL

## 2024-04-16 DIAGNOSIS — F17.210 CIGARETTE SMOKER: ICD-10-CM

## 2024-04-16 PROBLEM — J38.7 LEUKOPLAKIA OF LARYNX: Status: ACTIVE | Noted: 2024-04-11

## 2024-04-16 PROCEDURE — 99406 BEHAV CHNG SMOKING 3-10 MIN: CPT | Mod: 95 | Performed by: INTERNAL MEDICINE

## 2024-04-19 ENCOUNTER — TELEMEDICINE CLINICAL SUPPORT (OUTPATIENT)
Dept: CARDIAC REHAB | Facility: CLINIC | Age: 39
End: 2024-04-19
Payer: COMMERCIAL

## 2024-04-19 DIAGNOSIS — F17.210 CIGARETTE SMOKER: ICD-10-CM

## 2024-04-19 PROCEDURE — 99407 BEHAV CHNG SMOKING > 10 MIN: CPT | Performed by: INTERNAL MEDICINE

## 2024-04-19 RX ORDER — DIPHENHYDRAMINE HCL 25 MG
4 CAPSULE ORAL AS NEEDED
Qty: 100 EACH | Refills: 2 | Status: CANCELLED | OUTPATIENT
Start: 2024-04-19 | End: 2024-05-19

## 2024-04-19 NOTE — PROGRESS NOTES
Tobacco Treatment Counseling Follow Up Session    Name: Rosibel Staton            MRN: [id number]          YOB: 1985           Age: 38 y.o.                  Today’s Date: 4/19/2024  Primary Care Physician: Charli Cassidy DO  [place] [facility] [place] [id number]         Start time: 10:00 am   End time: 10:30 am      Tamiko Newman RN

## 2024-04-22 ENCOUNTER — TELEPHONE (OUTPATIENT)
Dept: OTOLARYNGOLOGY | Facility: HOSPITAL | Age: 39
End: 2024-04-22
Payer: COMMERCIAL

## 2024-04-23 ENCOUNTER — SPECIALTY PHARMACY (OUTPATIENT)
Dept: PHARMACY | Facility: CLINIC | Age: 39
End: 2024-04-23

## 2024-04-23 ENCOUNTER — TELEMEDICINE CLINICAL SUPPORT (OUTPATIENT)
Dept: CARDIAC REHAB | Facility: HOSPITAL | Age: 39
End: 2024-04-23
Payer: COMMERCIAL

## 2024-04-23 DIAGNOSIS — F17.210 CIGARETTE SMOKER: ICD-10-CM

## 2024-04-23 NOTE — PROGRESS NOTES
Tobacco Cessation Counseling Session Note    Name: Rosibel Staton            MRN: [id number]          YOB: 1985           Age: 38 y.o.                  Today’s Date: 4/23/2024  Primary Care Physician: Charli Cassidy DO  Program Location: [address]         Start time: 11:00 AM   End time: 11:08 AM      Tobacco Treatment Specialist- Tamiko Newman RN

## 2024-04-24 DIAGNOSIS — G43.709 CHRONIC MIGRAINE WITHOUT AURA WITHOUT STATUS MIGRAINOSUS, NOT INTRACTABLE: Primary | ICD-10-CM

## 2024-04-24 RX ORDER — ATOGEPANT 60 MG/1
60 TABLET ORAL DAILY
Qty: 30 TABLET | Refills: 11 | Status: SHIPPED | OUTPATIENT
Start: 2024-04-24

## 2024-05-01 ENCOUNTER — TELEPHONE (OUTPATIENT)
Dept: OBSTETRICS AND GYNECOLOGY | Facility: CLINIC | Age: 39
End: 2024-05-01
Payer: COMMERCIAL

## 2024-05-01 NOTE — TELEPHONE ENCOUNTER
Patient called stated she has an odor and hurts to wipe last time this happened she had BV. Wanting to see if she can come in tmrw for a swab or urine sample. Please advise

## 2024-05-02 ENCOUNTER — OFFICE VISIT (OUTPATIENT)
Dept: OBSTETRICS AND GYNECOLOGY | Facility: CLINIC | Age: 39
End: 2024-05-02
Payer: MEDICARE

## 2024-05-02 VITALS
SYSTOLIC BLOOD PRESSURE: 102 MMHG | WEIGHT: 135 LBS | DIASTOLIC BLOOD PRESSURE: 72 MMHG | BODY MASS INDEX: 21.69 KG/M2 | HEIGHT: 66 IN

## 2024-05-02 DIAGNOSIS — N95.2 ATROPHIC VAGINITIS: ICD-10-CM

## 2024-05-02 DIAGNOSIS — N76.0 BACTERIAL VAGINOSIS: Primary | ICD-10-CM

## 2024-05-02 DIAGNOSIS — B96.89 BACTERIAL VAGINOSIS: Primary | ICD-10-CM

## 2024-05-02 PROBLEM — N93.9 ABNORMAL UTERINE BLEEDING: Status: RESOLVED | Noted: 2023-03-08 | Resolved: 2024-05-02

## 2024-05-02 PROBLEM — N80.9 ENDOMETRIOSIS: Status: RESOLVED | Noted: 2023-03-08 | Resolved: 2024-05-02

## 2024-05-02 PROBLEM — N97.0 ANOVULATION: Status: RESOLVED | Noted: 2023-03-08 | Resolved: 2024-05-02

## 2024-05-02 PROCEDURE — 99214 OFFICE O/P EST MOD 30 MIN: CPT | Performed by: OBSTETRICS & GYNECOLOGY

## 2024-05-02 PROCEDURE — 87205 SMEAR GRAM STAIN: CPT

## 2024-05-02 RX ORDER — METRONIDAZOLE 500 MG/1
500 TABLET ORAL 2 TIMES DAILY
Qty: 14 TABLET | Refills: 0 | Status: SHIPPED | OUTPATIENT
Start: 2024-05-02 | End: 2024-05-14 | Stop reason: WASHOUT

## 2024-05-02 ASSESSMENT — ENCOUNTER SYMPTOMS
CONSTIPATION: 0
JOINT SWELLING: 0
ACTIVITY CHANGE: 0
DIARRHEA: 0
COUGH: 0
HEMATURIA: 0
AGITATION: 0
APNEA: 0
DIZZINESS: 0

## 2024-05-02 NOTE — PROGRESS NOTES
Subjective   Patient ID: Rosibel Staton is a 38 y.o. female who presents for Vaginitis/Bacterial Vaginosis (C/o discharge, discomfort, and odor.).  She is s/p TLH LSO and subsequent RSO in 2019. 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 This was for menorrhagia, pelvic pain and ovarian cysts. She is doing well with vaginal estrogen tablets twice weekly.    She notes a vaginal odor and discomfort. This is for 5 days. There is slight discharge only. The last time she had symptoms like this she was treated for BV. She has no concern for STD exposure. No dysuria.           Review of Systems   Constitutional:  Negative for activity change.   HENT:  Negative for congestion.    Respiratory:  Negative for apnea and cough.    Cardiovascular:  Negative for chest pain.   Gastrointestinal:  Negative for constipation and diarrhea.   Genitourinary:  Negative for hematuria and vaginal pain.   Musculoskeletal:  Negative for joint swelling.   Neurological:  Negative for dizziness.   Psychiatric/Behavioral:  Negative for agitation.        Past Medical History:   Diagnosis Date    Cough, unspecified 04/27/2018    Cough    Endometriosis 03/08/2023    Other cervical disc displacement, unspecified cervical region 10/03/2016    Cervical disc herniation    Personal history of other diseases of the circulatory system     History of hypertension    Personal history of other diseases of the digestive system     History of esophageal reflux    Personal history of other diseases of the musculoskeletal system and connective tissue 05/04/2016    History of herniated intervertebral disc    Personal history of other diseases of the respiratory system 01/06/2017    History of deviated nasal septum    Personal history of other diseases of urinary system     History of bladder problems    Personal history of other infectious and parasitic diseases 03/05/2020    History of candidiasis of mouth    Personal history of other  mental and behavioral disorders     History of depression    Personal history of urinary (tract) infections 02/24/2021    History of urinary tract infection    Snoring     Snoring      Past Surgical History:   Procedure Laterality Date    OTHER SURGICAL HISTORY  10/24/2016    Drainage Of Pelvic Abscess    OTHER SURGICAL HISTORY  05/25/2022    Oophorectomy bilateral    OTHER SURGICAL HISTORY  07/16/2019    Laparoscopic hysterectomy    OTHER SURGICAL HISTORY  01/06/2017    Abdominal Surgery      Allergies   Allergen Reactions    Nortriptyline Psychosis    Amoxicillin-Pot Clavulanate Unknown    Azithromycin Unknown    Bacitracin Zinc-Polymyxin B Unknown    Ciprofloxacin Unknown    Citalopram Unknown    Duloxetine Unknown    Gabapentin Unknown    Gadolinium-Containing Contrast Media Unknown    Lithium Unknown    Metaxalone Unknown    Nickel Unknown    Nitrofurantoin Monohyd/M-Cryst Unknown    Prednisone Unknown    Quetiapine Unknown    Scopolamine Unknown    Sertraline Unknown    Sulfamethoxazole-Trimethoprim Unknown    Tramadol Unknown      Current Outpatient Medications on File Prior to Visit   Medication Sig Dispense Refill    ARIPiprazole (Abilify) 15 mg tablet Take 1 tablet (15 mg) by mouth once daily. 90 tablet 3    atogepant (Qulipta) 60 mg tablet tablet Take 1 tablet (60 mg) by mouth once daily. 30 tablet 11    atorvastatin (Lipitor) 40 mg tablet Take 1 tablet (40 mg) by mouth once daily at bedtime. 90 tablet 3    azelastine (Astelin) 137 mcg (0.1 %) nasal spray Administer 1 spray into each nostril 2 times a day. Use in each nostril as directed 30 mL 1    clonazePAM (KlonoPIN) 1 mg tablet Take 1 tablet (1 mg) by mouth 3 times a day as needed for anxiety. 90 tablet 2    estradiol (Vagifem) 10 mcg tablet vaginal tablet Insert 1 tablet (10 mcg) into the vagina 2 times a week. Insert one tablet into the vagina daily for 14 days then continue using twice weekly 34 tablet 3    fluticasone (Flonase) 50 mcg/actuation  nasal spray Administer 1 spray into each nostril 2 times a day. Shake gently. Before first use, prime pump. After use, clean tip and replace cap. 16 g 1    hydrOXYzine pamoate (Vistaril) 50 mg capsule Take 1 capsule (50 mg) by mouth every 6 hours if needed for anxiety. 90 capsule 11    metaxalone (Skelaxin) 800 mg tablet Take 1 tablet (800 mg) by mouth 3 times a day as needed.      nicotine (Nicoderm CQ) 21 mg/24 hr patch Place 1 patch over 24 hours on the skin once every 24 hours. 30 patch 0    nicotine polacrilex (Nicorette) 2 mg gum Chew 1 each (2 mg) if needed for smoking cessation. 100 each 0    ondansetron (Zofran) 8 mg tablet Take 1 tablet (8 mg) by mouth every 12 hours if needed for nausea. 20 tablet 11    propranolol (Inderal) 10 mg tablet Take 1 tablet (10 mg) by mouth 2 times a day as needed (Anxiety). (Patient taking differently: Take 1 tablet (10 mg) by mouth 3 times a day.) 60 tablet 11    rizatriptan MLT (Maxalt-MLT) 10 mg disintegrating tablet Take 1 tablet (10 mg) by mouth 1 time if needed for migraine. May repeat in 2 hours if unresolved. Do not exceed 30 mg in 24 hours. 9 tablet 2     No current facility-administered medications on file prior to visit.        Objective   Physical Exam  Constitutional:       Appearance: Normal appearance.   Cardiovascular:      Rate and Rhythm: Normal rate and regular rhythm.   Pulmonary:      Effort: Pulmonary effort is normal.      Breath sounds: Normal breath sounds.   Abdominal:      Palpations: Abdomen is soft. There is no mass.      Tenderness: There is no abdominal tenderness.      Hernia: No hernia is present.   Genitourinary:     General: Normal vulva.      Exam position: Lithotomy position.      Labia:         Right: No lesion.         Left: No lesion.       Urethra: No urethral lesion.      Vagina: Normal. No lesions or prolapsed vaginal walls.      Uterus: Absent.       Adnexa: Right adnexa normal and left adnexa normal.        Right: No mass or  tenderness.          Left: No mass or tenderness.        Comments: White discharge is noted with slight odor consistent with BV.  Skin:     General: Skin is warm and dry.   Neurological:      Mental Status: She is alert.           Problem List Items Addressed This Visit       Atrophic vaginitis    Overview     She is surgically menopausal. Vaginal atrophy is treated with estradiol vaginal tablets twice weekly.         Bacterial vaginosis - Primary    Overview     Vaginal discharge with odor and irritation. Gram stain is sent and metronidazole prescribed.          Relevant Medications    metroNIDAZOLE (Flagyl) 500 mg tablet    Other Relevant Orders    Vaginitis Gram Stain For Bacterial Vaginosis + Yeast

## 2024-05-03 LAB
CLUE CELLS VAG LPF-#/AREA: PRESENT /[LPF]
NUGENT SCORE: 7
YEAST VAG WET PREP-#/AREA: ABNORMAL

## 2024-05-09 ENCOUNTER — TELEPHONE (OUTPATIENT)
Dept: OTHER | Age: 39
End: 2024-05-09
Payer: COMMERCIAL

## 2024-05-09 DIAGNOSIS — F43.10 PTSD (POST-TRAUMATIC STRESS DISORDER): ICD-10-CM

## 2024-05-09 DIAGNOSIS — F41.1 GENERALIZED ANXIETY DISORDER: ICD-10-CM

## 2024-05-09 RX ORDER — PROPRANOLOL HYDROCHLORIDE 10 MG/1
10 TABLET ORAL 3 TIMES DAILY
Qty: 90 TABLET | Refills: 11 | Status: SHIPPED | OUTPATIENT
Start: 2024-05-09 | End: 2025-05-09

## 2024-05-09 RX ORDER — CLONAZEPAM 1 MG/1
1 TABLET ORAL 3 TIMES DAILY PRN
Qty: 90 TABLET | Refills: 2 | Status: SHIPPED | OUTPATIENT
Start: 2024-05-09 | End: 2024-08-07

## 2024-05-14 ENCOUNTER — OFFICE VISIT (OUTPATIENT)
Dept: NEUROLOGY | Facility: CLINIC | Age: 39
End: 2024-05-14
Payer: COMMERCIAL

## 2024-05-14 VITALS
WEIGHT: 133 LBS | DIASTOLIC BLOOD PRESSURE: 66 MMHG | HEIGHT: 66 IN | HEART RATE: 80 BPM | SYSTOLIC BLOOD PRESSURE: 100 MMHG | BODY MASS INDEX: 21.38 KG/M2

## 2024-05-14 DIAGNOSIS — G43.009 MIGRAINE WITHOUT AURA AND WITHOUT STATUS MIGRAINOSUS, NOT INTRACTABLE: Primary | ICD-10-CM

## 2024-05-14 PROCEDURE — 99214 OFFICE O/P EST MOD 30 MIN: CPT

## 2024-05-14 RX ORDER — ACETAMINOPHEN, DIPHENHYDRAMINE HCL, PHENYLEPHRINE HCL 325; 25; 5 MG/1; MG/1; MG/1
20 TABLET ORAL NIGHTLY
COMMUNITY
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
## 2024-05-14 NOTE — LETTER
May 14, 2024     Patient: Rosibel Staton   YOB: 1985   Date of Visit: 5/14/2024       To Whom It May Concern:    It is my medical opinion that Rosibel Staton may return to light duty immediately with the following restrictions: 20-25 hours per week, limited exposure to loud noises and bright lights . Patient should be able to take 10-15 minute breaks as needed to take her migraine rescue medication.    If you have any questions or concerns, please don't hesitate to call.         Sincerely,        Halima Garg PA-C    CC: No Recipients

## 2024-05-14 NOTE — ASSESSMENT & PLAN NOTE
"Unable to tolerate qulipta due to n/v although it did greatly help her migraines. Unable to tolerate rizatriptan and sumatriptan as it made her feel \"drugged\"    Will try Nurtec for prevention, every other day dosing   Request that she discuss her poor appetite and nausea with her PCP especially if it does not resolve with stopping qulipta.  Would like to return to work w limitations, OK with this, new letter written  "

## 2024-05-14 NOTE — PATIENT INSTRUCTIONS
Since Qulipta helped your migraines but caused a lot of nausea, we will try Nurtec.Take on tablet every other day. You can take an additional one on off days if you have a breakthrough, no more than 1 in 24 hrs.     Continue propranolol.     Work letter updated today.     Follow up in 3 months or sooner as needed. You can see Ramona Restrepo CNP or Dr. Kulkarni here.

## 2024-05-17 DIAGNOSIS — G43.009 MIGRAINE WITHOUT AURA AND WITHOUT STATUS MIGRAINOSUS, NOT INTRACTABLE: Primary | ICD-10-CM

## 2024-05-20 ENCOUNTER — DOCUMENTATION (OUTPATIENT)
Dept: PHYSICAL THERAPY | Facility: CLINIC | Age: 39
End: 2024-05-20
Payer: COMMERCIAL

## 2024-05-20 ENCOUNTER — SPECIALTY PHARMACY (OUTPATIENT)
Dept: PHARMACY | Facility: CLINIC | Age: 39
End: 2024-05-20

## 2024-05-20 NOTE — PROGRESS NOTES
Physical Therapy    Discharge Summary    Name: Rosibel Staton  MRN: [id number]  : 1985  Date: 24    Discharge Summary: PT    Discharge Information: Date of discharge 2024    Therapy Summary: Patient with extensive HEP for stretching and strengthening and Independent in HEP    Discharge Status: Unknown     Rehab Discharge Reason: C-9 has  and no further extensions have been requested.

## 2024-05-22 ENCOUNTER — TELEMEDICINE (OUTPATIENT)
Dept: BEHAVIORAL HEALTH | Facility: CLINIC | Age: 39
End: 2024-05-22
Payer: COMMERCIAL

## 2024-05-22 ENCOUNTER — TELEPHONE (OUTPATIENT)
Dept: OTOLARYNGOLOGY | Facility: HOSPITAL | Age: 39
End: 2024-05-22
Payer: COMMERCIAL

## 2024-05-22 DIAGNOSIS — F41.1 GENERALIZED ANXIETY DISORDER: ICD-10-CM

## 2024-05-22 DIAGNOSIS — F33.1 MODERATE EPISODE OF RECURRENT MAJOR DEPRESSIVE DISORDER (MULTI): ICD-10-CM

## 2024-05-22 DIAGNOSIS — F43.10 PTSD (POST-TRAUMATIC STRESS DISORDER): ICD-10-CM

## 2024-05-22 PROCEDURE — 99214 OFFICE O/P EST MOD 30 MIN: CPT | Performed by: STUDENT IN AN ORGANIZED HEALTH CARE EDUCATION/TRAINING PROGRAM

## 2024-05-22 NOTE — TELEPHONE ENCOUNTER
Called pt back and let her know that Dr. North typically has pt's return to work 1-2 weeks after surgery depending on how they feel and he typically has POVs 3-4 weeks after surgery for his pts. Pt stated ok thank you and that she has the fax number to fax forms from her work. Dr. North's nurse, adela Krishna.
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
## 2024-06-03 ASSESSMENT — ENCOUNTER SYMPTOMS
HEADACHES: 1
VOICE CHANGE: 1

## 2024-06-03 NOTE — CPM/PAT H&P
CPM/PAT Evaluation       Name: Rosibel Staton (Rosibel Staton)  /Age: 1985/39 y.o.     TELEMEDICINE ENCOUNTER  Patient was contacted by telephone for preadmission testing perioperative risk assessment prior to surgery.    CHIEF COMPLAINT  Leukoplakia of the Larynx    HPI  Rosibel Staton is a 39-year-old female complaining of voice changes.  Clinical examination with stroboscopy was suggested of ulcerative laryngitis.  She was found to have leukoplakia bilaterally with some centralized erythema on the left, and Yandel's edema changes on the superior right vocal cord.  She is scheduled for MicroDirect laryngoscopy, bronchoscopy, KTP laser, biopsy, and injection on 2024.    ACTIVE PROBLEMS  Patient Active Problem List   Diagnosis    Abnormal blood chemistry    Arm pain    Arthralgia of right wrist    Bacterial overgrowth syndrome    Bacterial vaginosis    Balance disorder    Body aches    Breast pain, left    Bruising    Cervical facet syndrome    Cervical radiculitis    Chronic left upper quadrant pain    Chronic sinus infection    Contact dermatitis    Cough    Dense breast tissue on mammogram    Dysuria    Eye irritation    Fall from standing    Closed nondisplaced fracture of middle third of scaphoid bone of right wrist    Fatigue    Feeling faint    Foot sprain, right, initial encounter    Generalized anxiety disorder    Headache    Hyperlipidemia    Hypotension    Kidney stones, calcium oxalate    Left breast mass    Lymphadenopathy    Marijuana use    Moderate episode of recurrent major depressive disorder (Multi)    Mood changes    Neck muscle spasm    Nausea in adult    Neck pain    Nephrolithiasis    Nicotine dependence    Oral thrush    Pain, flank, bilateral    Palpitations    Pelvic pain in female    Possible urinary tract infection    Post-operative pain    PTSD (post-traumatic stress disorder)    Right foot pain    Right shoulder pain    Seasonal allergies    Skin infection    Sore  throat    Stye    Syncope    UTI (urinary tract infection)    Vasomotor symptoms due to menopause    Vitamin D deficiency    Weight loss    Wound cellulitis    Yeast infection    Hordeolum externum of right upper eyelid    UTI symptoms    Possible exposure to STD    Chronic migraine with aura    Depression    Chronic daily headache    Migraine without aura and without status migrainosus, not intractable    Cervical paraspinal muscle spasm    Acute ankle pain    Anxiety    Calcium oxalate calculus    Disease due to severe acute respiratory syndrome coronavirus 2 (SARS-CoV-2)    History of depression    History of hypertension    Anomia    Dysphonia    Leukoplakia of larynx    Atrophic vaginitis     PAST MEDICAL HISTORY  Past Medical History:   Diagnosis Date    Cough, unspecified 04/27/2018    Cough    Endometriosis 03/08/2023    Other cervical disc displacement, unspecified cervical region 10/03/2016    Cervical disc herniation    Personal history of other diseases of the circulatory system     History of hypertension    Personal history of other diseases of the digestive system     History of esophageal reflux    Personal history of other diseases of the musculoskeletal system and connective tissue 05/04/2016    History of herniated intervertebral disc    Personal history of other diseases of the respiratory system 01/06/2017    History of deviated nasal septum    Personal history of other diseases of urinary system     History of bladder problems    Personal history of other infectious and parasitic diseases 03/05/2020    History of candidiasis of mouth    Personal history of other mental and behavioral disorders     History of depression    Personal history of urinary (tract) infections 02/24/2021    History of urinary tract infection    Snoring     Snoring   She had a fall with concussion 12/11/23, while at work at [employer], working in the [employer] a box containing 50 pounds of meat fell onto her head-  this has been treated as a workman's comp injury. She is seen by neurology for h/o migraines. Currently, her migraines occur 1-2 times per week, associated with photophobia, sonophobia, and nausea.     SURGICAL HISTORY  Past Surgical History:   Procedure Laterality Date    OTHER SURGICAL HISTORY  10/24/2016    Drainage Of Pelvic Abscess    OTHER SURGICAL HISTORY  05/25/2022    Oophorectomy bilateral    OTHER SURGICAL HISTORY  07/16/2019    Laparoscopic hysterectomy    OTHER SURGICAL HISTORY  01/06/2017    Abdominal Surgery for ORS (ovarian remnant syndrome) following hysterectomy       ANESTHESIA HISTORY  Patient reports history of postoperative nausea with vomiting.  Denies problems with anesthesia in the past such as PONV, prolonged sedation, awareness, dental damage, aspiration, cardiac arrest, difficult intubation, or unexpected hospital admissions. Denies family history of malignant hyperthermia, or pseudocholinesterase deficiency.    SOCIAL HISTORY  Former cigarette smoker quit on April 25, 2024.  Occasional alcohol use; occasional medical marijuana use; denies recreational drug use.  Patient states she works at [employer] and does a lot of walking throughout the day.  She states she is able to do moderate ADLs such as light housework, yard work.  She denies chest pain, MABRY.  METS 4    FAMILY HISTORY  Family History   Problem Relation Name Age of Onset    Breast cancer Mother      Stroke Father      Diabetes Father      Breast cancer Maternal Grandmother       ALLERGIES  Allergies   Allergen Reactions    Nortriptyline Psychosis    Amoxicillin-Pot Clavulanate Unknown    Azithromycin Unknown    Bacitracin Zinc-Polymyxin B Unknown    Ciprofloxacin Unknown    Citalopram Unknown    Duloxetine Unknown    Gabapentin Unknown    Gadolinium-Containing Contrast Media Unknown    Lithium Unknown    Metaxalone Unknown    Nickel Unknown    Nitrofurantoin Monohyd/M-Cryst Unknown    Prednisone Unknown    Quetiapine Unknown     Scopolamine Unknown    Sertraline Unknown    Sulfamethoxazole-Trimethoprim Unknown    Tramadol Unknown    Qulipta [Atogepant] Drowsiness and Nausea/vomiting     MEDICATIONS  No current facility-administered medications for this encounter.    Current Outpatient Medications:     ARIPiprazole (Abilify) 15 mg tablet, Take 1 tablet (15 mg) by mouth once daily., Disp: 90 tablet, Rfl: 3    atorvastatin (Lipitor) 40 mg tablet, Take 1 tablet (40 mg) by mouth once daily at bedtime., Disp: 90 tablet, Rfl: 3    clonazePAM (KlonoPIN) 1 mg tablet, Take 1 tablet (1 mg) by mouth 3 times a day as needed for anxiety., Disp: 90 tablet, Rfl: 2    estradiol (Vagifem) 10 mcg tablet vaginal tablet, Insert 1 tablet (10 mcg) into the vagina 2 times a week. Insert one tablet into the vagina daily for 14 days then continue using twice weekly, Disp: 34 tablet, Rfl: 3    hydrOXYzine pamoate (Vistaril) 50 mg capsule, Take 1 capsule (50 mg) by mouth every 6 hours if needed for anxiety., Disp: 90 capsule, Rfl: 11    melatonin 10 mg tablet, Take 2 tablets (20 mg) by mouth once daily at bedtime., Disp: , Rfl:     ondansetron (Zofran) 8 mg tablet, Take 1 tablet (8 mg) by mouth every 12 hours if needed for nausea., Disp: 20 tablet, Rfl: 11    propranolol (Inderal) 10 mg tablet, Take 1 tablet (10 mg) by mouth 3 times a day., Disp: 90 tablet, Rfl: 11    rimegepant (NURTEC) 75 mg tablet,disintegrating, Take 1 tablet (75 mg) by mouth every other day., Disp: 16 tablet, Rfl: 11    atogepant (Qulipta) 60 mg tablet tablet, Take 1 tablet (60 mg) by mouth once daily. (Patient not taking: Reported on 6/3/2024), Disp: 30 tablet, Rfl: 11    metaxalone (Skelaxin) 800 mg tablet, Take 1 tablet (800 mg) by mouth 3 times a day as needed., Disp: , Rfl:     Review of Systems   HENT:  Positive for voice change (Leukoplakia of the Larynx).    Neurological:  Positive for headaches.   All other systems reviewed and are negative.      HYSICAL EXAM  Deferred    AIRWAY  EXAM  Deferred    VITALS  No vitals taken for telemedicine visit  Height: 5 feet 6 inches; weight: 125 pounds; BMI: 20.18  BP Readings from Last 4 Encounters:   05/14/24 100/66   05/02/24 102/72   04/09/24 98/60   02/12/24 134/86     LABS  Lab Results   Component Value Date    WBC 11.6 (H) 10/18/2023    HGB 13.8 10/18/2023    HCT 42.8 10/18/2023    MCV 89 10/18/2023     10/18/2023     Lab Results   Component Value Date    GLUCOSE 86 08/30/2023    CALCIUM 9.5 08/30/2023     08/30/2023    K 4.5 08/30/2023    CO2 27 08/30/2023     08/30/2023    BUN 8 08/30/2023    CREATININE 0.63 08/30/2023     Lab orders placed for CBC, BMP on 06/03/2024.  Patient expressed understanding that lab work was to be completed before 1 week of surgery.    IMAGING  EKG from 01/17/2023    Interpretation Summary    Normal sinus rhythm  Normal ECG  When compared with ECG of 03-JAN-2022 11:06,  No significant change was found  Confirmed by Gareth Wilkins ([id number]) on 1/20/2023 1:34:39 PM    Echocardiogram from 12/18/2020     PHYSICIAN INTERPRETATION:  Left Ventricle: The left ventricular systolic function is normal, with an estimated ejection fraction of 55-60%. There are no regional wall motion abnormalities. The left ventricular cavity size is normal. Spectral Doppler shows a normal pattern of left ventricular diastolic filling.  Left Atrium: The left atrium is normal in size.  Right Ventricle: The right ventricle is normal in size. There is normal right ventricular global systolic function.  Right Atrium: The right atrium is normal in size.  Aortic Valve: The aortic valve is trileaflet. There is no evidence of aortic valve regurgitation. The peak instantaneous gradient of the aortic valve is 4.7 mmHg.  Mitral Valve: The mitral valve is normal in structure. There is no evidence of mitral valve regurgitation.  Tricuspid Valve: The tricuspid valve is structurally normal. There is trace tricuspid regurgitation. The right  ventricular systolic pressure is unable to be estimated.  Pulmonic Valve: The pulmonic valve is structurally normal. There is no indication of pulmonic valve regurgitation.  Pericardium: There is a trivial pericardial effusion.  Aorta: The aortic root is normal.  Systemic Veins: The inferior vena cava appears to be of normal size. There is IVC inspiratory collapse greater than 50%.  In comparison to the previous echocardiogram(s): There are no prior studies on this patient for comparison purposes.        CONCLUSIONS:   1. The left ventricular systolic function is normal with a 55-60% estimated ejection fraction.      ASSESSMENT/PLAN  Leukoplakia of the Larynx  MicroDirect laryngoscopy, bronchoscopy, KTP laser, biopsy, injection      This note was created in part upon personal review of patient's medical records.  Speech recognition transcription software was used in the creation of this note. Despite proofreading, several typographical errors might be present that might affect the meaning of the content.

## 2024-06-03 NOTE — PREPROCEDURE INSTRUCTIONS
Pre-Op Instructions & Checklist   Your surgery has been scheduled at [facility] at [address] [place], [address], [address], in the [facility]. Parking is to the left of the main entrance.  You will be contacted about the time of your surgery the day before your surgery. If you are unable to answer the phone, a detailed voicemail message will be left. Make sure that your voicemail box is not full so a message can be left. If you have not received a call by 3:00 pm you may call [phone] between the hours of 3:00 and 4:00 pm. Please be available by phone the night before/day of surgery in case there is a change in the schedule which may require you to arrive earlier/later.    14 DAYS BEFORE SURGERY STOP TAKING WEIGHT LOSS MEDICATIONS      7 DAYS BEFORE SURGERY STOP THESE MEDICATIONS:  Multiple Vitamins containing Vitamin E  Herbal supplements, Fish Oil, garlic pills, turmeric, CoQ enzyme  Stop taking aspirin, and aspirin-containing products as well as NSAID's such as Advil, Motrin, Aleve, Ibuprofen. Tylenol is okay to take for pain relief.   If you are currently taking Coumadin/Warfarin, we will have to coordinate that with your PCP &/or the Anticoagulation Clinic.    THE DAY BEFORE SURGERY:  Do not eat any food after midnight the night before surgery.   You are permitted to have clear liquids such as water, apple juice, plain tea or coffee (no milk or creamer), clear electrolyte-replenishing drinks such as Pedialyte, Gatorade, or Powerade (not yogurt or pulp-containing smoothies or juices such as orange juice) up to 2 hours before your surgery.    DAY OF SURGERY, TAKE THESE MEDICATIONS with a small sip of water (if it is not listed, do not take it):  Take: Klonopin; propranolol; hydroxyzine if needed    ON THE MORNING OF SURGERY:  *Shower either the night before your surgery or the morning of your surgery  *Do not use moisturizers, creams, lotions or perfume, or  make-up.  *Wear comfortable, loose fitting clothing.   *All jewelry and valuables should be left at home.  *Prosthetic devices such as contact lenses, hearing aids, dentures, eyelash extensions, hairpins and body piercings must be removed before surgery. Bring containers for eyeglasses/contacts, dentures, or hearing aids with you.  Diabetics: Please check fasting blood sugars upon waking up.  If fasting blood sugars are <80ml/dl, please drink 100ml/3oz. of apple juice no later than 2 hours prior to surgery.      BRING WITH YOU:   *Photo ID and insurance card  *Current list of medicines and allergies  *Pacemaker/Defibrillator/Heart stent cards  *Copy of your complete Advanced Directive/DHPOA-if applicable     SMOKING:  *Quitting smoking can make a huge difference to your health and recovery from surgery.    *If you need help with quitting, call 5-043-QUIT-NOW.  Alcohol:  *No alcoholic beverages for 48 hours before surgery.     AFTER OUTPATIENT SURGERY:  *A responsible adult MUST accompany you at the time of discharge and stay with you for 24 hours after your surgery.  *You may NOT drive yourself home after surgery.  *You may use a taxi or ride sharing service ([employer], Uber) to return home ONLY if you are to change the date accompanied by a friend or family member.  *Instructions for resuming your medications will be provided by your surgeon.     CONTACT SURGEON'S OFFICE IF YOU DEVELOP:  * Fever =/> 100.4 F   * New respiratory symptoms (e.g. cough, shortness of breath, respiratory distress, sore throat)  * Recent loss of taste or smell  *Flu like symptoms such as headache, fatigue or gastrointestinal symptoms  * If you develop any open sores, shingles, burning or painful urination   AND/OR:  * You no longer wish to have the surgery.  * Any other personal circumstances change that may lead to the need to cancel or defer this surgery.  *You were admitted to any hospital within one week of your planned procedure.     If  you have any questions regarding these preoperative instructions you may call [phone]. If you have questions regarding you surgical procedure, or post-operative care/recovery please call your surgeon's office.

## 2024-06-10 NOTE — PROGRESS NOTES
Speech-Language Pathology    SLP ADULT Outpatient Speech-Language Cognition Evaluation    Patient Name: Rosibel Staton  MRN: [id number]  Today's Date: 2/5/2024   Time Calculation  Start Time: 0815  Stop Time: 0910  Time Calculation (min): 55 min        Current Problem:   1. Anomia              SLP Assessment:  SLP Assessment Results:     This date, patient presented with WFL cognitive-linguistic skills s/p concussion. Patient reported skills have returned to baseline, especially with the improvement of her headaches and head/neck pain. No ongoing, skilled ST services are recommended at this time. Patient expressed agreement with recommendations.    Prognosis: Excellent  Treatment Provided: No  Strengths: Motivation, Family Support      SLP Plan:  SLP Plan: No skilled ST services recommended. Evaluation only.  Discussed POC: Patient  Discussed Risks/Benefits: Yes  Patient/Caregiver Agreeable: Yes       General Visit Information:  Reason for Referral:    Patient was referred for outpatient Speech Therapy evaluation 2/2 concussion. On 12/11/2023, patient experienced head/neck trauma at work when a 50 lb box of meat fell from a high shelf, striking the patient. Patient did not lose consciousness but felt nauseated, foggy, and dizzy. She reported that she developed headaches and sensitivity to light and sound. Patient began experiencing difficulty with word finding, sometimes struggling to retrieve a word or saying words backward/mixed up, producing utterances that did not make sense to her listeners, repeating the same message to listeners multiple times, difficulty concentrating, and reduced reading comprehension.  She stated she was not always aware of her communicative deficits when they were occurring.    Currently, patient reported she feels nearly back to baseline for both communication and cognitive skills. If she has difficulty recalling a word or says a word incorrectly, she is aware of the occurrence and  "quickly self-corrects. Cognitively, patient feels her concentration has returned to normal, especially in the past few weeks as her physical symptoms have improved. She was unsure about her need to attend today's appointment given the improvement of her symptoms.    *Social Hx: Lives with her father for whom she is the primary caregiver; single/actively in divorce proceedings; works in the [employer] and [employer] departments at [employer] (has not returned to work yet)    Referred By: CLIFF Diop  Past Medical History Relevant to Rehab: MDD, PTSD, generalized anxiety disorder, HLD  Number of Authorized Treatments : [facility]  Total Number of Visits : 1        Objective     Pain:  Pain Assessment: 0-10  Pain Score: 5 - Moderate pain  Pain Location: Neck      Freeborn Naming Test (BNT):  --Spontaneous correct responses: 52/60 (87%)  --Stimulus cues given: 8  --Correct responses with stimulus cues: 0/8  --Phonemic cues give: 8  --Correct responses with phonemic cues: 4/8  --Multiple choice cues given: 4  --Correct responses with multiple choice cues: 3/4  *5/8 incorrect responses were words that patient indicated she did not know the names of PTA      Travis Cognitive Assessment (MOCA):  -- Visuospatial / Executive: 5/5  -- Naming: 3/3  -- Memory (Immediate): 5/5, 5/5  -- Attention: 5/6 (-1)  -- Language: 3/3  -- Abstraction: 2/2  -- Memory (Delayed, 5 minutes): 3/5 indep, 4/5 category cues, 5/5 multi choice cues (-2)     > Memory Index Score: 12/15  -- Orientation: 6/6  ** TOTAL (\"normal:\" >26/30): 27/30        Outpatient Education:  Individual(s) Educated: Patient  Verbal Education: Results of evaluation; treatment recommendations  Risk and Benefits Discussed with Patient/Caregiver/Other: yes  Patient/Caregiver Demonstrated Understanding: yes  Plan of Care Discussed and Agreed Upon: yes  Patient Response to Education: Patient/Caregiver Verbalized Understanding of Information, Patient/Caregiver Asked Appropriate " Questions   Bulb Hung-Pharynx Suction Only

## 2024-06-11 PROCEDURE — RXMED WILLOW AMBULATORY MEDICATION CHARGE

## 2024-06-13 ENCOUNTER — SPECIALTY PHARMACY (OUTPATIENT)
Dept: PHARMACY | Facility: CLINIC | Age: 39
End: 2024-06-13

## 2024-06-14 ENCOUNTER — LAB (OUTPATIENT)
Dept: LAB | Facility: LAB | Age: 39
End: 2024-06-14
Payer: COMMERCIAL

## 2024-06-14 ENCOUNTER — PHARMACY VISIT (OUTPATIENT)
Dept: PHARMACY | Facility: CLINIC | Age: 39
End: 2024-06-14
Payer: COMMERCIAL

## 2024-06-14 DIAGNOSIS — Z41.9 SURGERY, ELECTIVE: ICD-10-CM

## 2024-06-14 DIAGNOSIS — J38.7 LEUKOPLAKIA OF LARYNX: ICD-10-CM

## 2024-06-14 LAB
ANION GAP SERPL CALC-SCNC: 12 MMOL/L (ref 10–20)
BUN SERPL-MCNC: 11 MG/DL (ref 6–23)
CALCIUM SERPL-MCNC: 8.9 MG/DL (ref 8.6–10.3)
CHLORIDE SERPL-SCNC: 103 MMOL/L (ref 98–107)
CO2 SERPL-SCNC: 29 MMOL/L (ref 21–32)
CREAT SERPL-MCNC: 0.76 MG/DL (ref 0.5–1.05)
EGFRCR SERPLBLD CKD-EPI 2021: >90 ML/MIN/1.73M*2
ERYTHROCYTE [DISTWIDTH] IN BLOOD BY AUTOMATED COUNT: 11.9 % (ref 11.5–14.5)
GLUCOSE SERPL-MCNC: 117 MG/DL (ref 74–99)
HCT VFR BLD AUTO: 42.2 % (ref 36–46)
HGB BLD-MCNC: 13.7 G/DL (ref 12–16)
MCH RBC QN AUTO: 29.7 PG (ref 26–34)
MCHC RBC AUTO-ENTMCNC: 32.5 G/DL (ref 32–36)
MCV RBC AUTO: 92 FL (ref 80–100)
NRBC BLD-RTO: 0 /100 WBCS (ref 0–0)
PLATELET # BLD AUTO: 251 X10*3/UL (ref 150–450)
POTASSIUM SERPL-SCNC: 4.1 MMOL/L (ref 3.5–5.3)
RBC # BLD AUTO: 4.61 X10*6/UL (ref 4–5.2)
SODIUM SERPL-SCNC: 140 MMOL/L (ref 136–145)
WBC # BLD AUTO: 9.5 X10*3/UL (ref 4.4–11.3)

## 2024-06-14 PROCEDURE — 80048 BASIC METABOLIC PNL TOTAL CA: CPT

## 2024-06-14 PROCEDURE — 85027 COMPLETE CBC AUTOMATED: CPT

## 2024-06-20 ENCOUNTER — ANESTHESIA EVENT (OUTPATIENT)
Dept: OPERATING ROOM | Facility: CLINIC | Age: 39
End: 2024-06-20
Payer: COMMERCIAL

## 2024-06-21 ENCOUNTER — ANESTHESIA (OUTPATIENT)
Dept: OPERATING ROOM | Facility: CLINIC | Age: 39
End: 2024-06-21
Payer: COMMERCIAL

## 2024-06-21 ENCOUNTER — HOSPITAL ENCOUNTER (OUTPATIENT)
Facility: CLINIC | Age: 39
Setting detail: OUTPATIENT SURGERY
Discharge: HOME | End: 2024-06-21
Attending: OTOLARYNGOLOGY | Admitting: OTOLARYNGOLOGY
Payer: COMMERCIAL

## 2024-06-21 VITALS
DIASTOLIC BLOOD PRESSURE: 66 MMHG | TEMPERATURE: 96.8 F | BODY MASS INDEX: 21.83 KG/M2 | SYSTOLIC BLOOD PRESSURE: 137 MMHG | WEIGHT: 135.8 LBS | HEART RATE: 58 BPM | OXYGEN SATURATION: 98 % | HEIGHT: 66 IN | RESPIRATION RATE: 16 BRPM

## 2024-06-21 DIAGNOSIS — R11.0 NAUSEA: ICD-10-CM

## 2024-06-21 DIAGNOSIS — J38.7 LEUKOPLAKIA OF LARYNX: ICD-10-CM

## 2024-06-21 DIAGNOSIS — R49.0 DYSPHONIA: ICD-10-CM

## 2024-06-21 DIAGNOSIS — Z41.9 SURGERY, ELECTIVE: Primary | ICD-10-CM

## 2024-06-21 DIAGNOSIS — N95.2 ATROPHIC VAGINITIS: ICD-10-CM

## 2024-06-21 PROBLEM — Z98.890 PONV (POSTOPERATIVE NAUSEA AND VOMITING): Status: ACTIVE | Noted: 2024-06-21

## 2024-06-21 PROBLEM — T88.59XA DELAYED EMERGENCE FROM ANESTHESIA: Status: ACTIVE | Noted: 2024-06-21

## 2024-06-21 PROBLEM — R11.2 PONV (POSTOPERATIVE NAUSEA AND VOMITING): Status: ACTIVE | Noted: 2024-06-21

## 2024-06-21 PROCEDURE — 2500000004 HC RX 250 GENERAL PHARMACY W/ HCPCS (ALT 636 FOR OP/ED): Performed by: ANESTHESIOLOGIST ASSISTANT

## 2024-06-21 PROCEDURE — 7100000010 HC PHASE TWO TIME - EACH INCREMENTAL 1 MINUTE: Performed by: OTOLARYNGOLOGY

## 2024-06-21 PROCEDURE — 2500000004 HC RX 250 GENERAL PHARMACY W/ HCPCS (ALT 636 FOR OP/ED): Performed by: STUDENT IN AN ORGANIZED HEALTH CARE EDUCATION/TRAINING PROGRAM

## 2024-06-21 PROCEDURE — 3600000002 HC OR TIME - INITIAL BASE CHARGE - PROCEDURE LEVEL TWO: Performed by: OTOLARYNGOLOGY

## 2024-06-21 PROCEDURE — 2500000001 HC RX 250 WO HCPCS SELF ADMINISTERED DRUGS (ALT 637 FOR MEDICARE OP): Performed by: ANESTHESIOLOGY

## 2024-06-21 PROCEDURE — 2720000007 HC OR 272 NO HCPCS: Performed by: OTOLARYNGOLOGY

## 2024-06-21 PROCEDURE — 7100000009 HC PHASE TWO TIME - INITIAL BASE CHARGE: Performed by: OTOLARYNGOLOGY

## 2024-06-21 PROCEDURE — 88305 TISSUE EXAM BY PATHOLOGIST: CPT | Mod: TC,SUR | Performed by: OTOLARYNGOLOGY

## 2024-06-21 PROCEDURE — 3700000001 HC GENERAL ANESTHESIA TIME - INITIAL BASE CHARGE: Performed by: OTOLARYNGOLOGY

## 2024-06-21 PROCEDURE — L8607 INJ VOCAL CORD BULKING AGENT: HCPCS | Performed by: OTOLARYNGOLOGY

## 2024-06-21 PROCEDURE — A4217 STERILE WATER/SALINE, 500 ML: HCPCS | Performed by: OTOLARYNGOLOGY

## 2024-06-21 PROCEDURE — 31571 LARYNGOSCOP W/VC INJ + SCOPE: CPT | Performed by: OTOLARYNGOLOGY

## 2024-06-21 PROCEDURE — 2500000005 HC RX 250 GENERAL PHARMACY W/O HCPCS: Performed by: ANESTHESIOLOGIST ASSISTANT

## 2024-06-21 PROCEDURE — 7100000001 HC RECOVERY ROOM TIME - INITIAL BASE CHARGE: Performed by: OTOLARYNGOLOGY

## 2024-06-21 PROCEDURE — 7100000002 HC RECOVERY ROOM TIME - EACH INCREMENTAL 1 MINUTE: Performed by: OTOLARYNGOLOGY

## 2024-06-21 PROCEDURE — 3600000007 HC OR TIME - EACH INCREMENTAL 1 MINUTE - PROCEDURE LEVEL TWO: Performed by: OTOLARYNGOLOGY

## 2024-06-21 PROCEDURE — 3700000002 HC GENERAL ANESTHESIA TIME - EACH INCREMENTAL 1 MINUTE: Performed by: OTOLARYNGOLOGY

## 2024-06-21 PROCEDURE — 31622 DX BRONCHOSCOPE/WASH: CPT | Performed by: OTOLARYNGOLOGY

## 2024-06-21 PROCEDURE — 31541 LARYNSCOP W/TUMR EXC + SCOPE: CPT | Performed by: OTOLARYNGOLOGY

## 2024-06-21 PROCEDURE — 2500000001 HC RX 250 WO HCPCS SELF ADMINISTERED DRUGS (ALT 637 FOR MEDICARE OP): Performed by: ANESTHESIOLOGIST ASSISTANT

## 2024-06-21 PROCEDURE — 2500000004 HC RX 250 GENERAL PHARMACY W/ HCPCS (ALT 636 FOR OP/ED): Performed by: OTOLARYNGOLOGY

## 2024-06-21 PROCEDURE — 2500000002 HC RX 250 W HCPCS SELF ADMINISTERED DRUGS (ALT 637 FOR MEDICARE OP, ALT 636 FOR OP/ED): Performed by: ANESTHESIOLOGY

## 2024-06-21 PROCEDURE — 31545 REMOVE VC LESION W/SCOPE: CPT | Performed by: OTOLARYNGOLOGY

## 2024-06-21 PROCEDURE — 2740000001 HC OR 274 NO HCPCS: Performed by: OTOLARYNGOLOGY

## 2024-06-21 DEVICE — PROLARYN GEL IS A WATER-BASED INJECTABLE GEL IMPLANT USED IN THE VOCAL FOLDS TO TREAT VOCAL FOLD INSUFFIENCY. PROLARYN GEL RESORBS WITHIN A PERIOD OF 3-6 MONTHS AND IS A TEMPORARY IMPLANT.
Type: IMPLANTABLE DEVICE | Site: THROAT | Status: FUNCTIONAL
Brand: PROLARYN GEL INJECTABLE IMPLANT

## 2024-06-21 RX ORDER — SODIUM CHLORIDE 0.9 G/100ML
IRRIGANT IRRIGATION AS NEEDED
Status: DISCONTINUED | OUTPATIENT
Start: 2024-06-21 | End: 2024-06-21 | Stop reason: HOSPADM

## 2024-06-21 RX ORDER — ROCURONIUM BROMIDE 10 MG/ML
INJECTION, SOLUTION INTRAVENOUS AS NEEDED
Status: DISCONTINUED | OUTPATIENT
Start: 2024-06-21 | End: 2024-06-21

## 2024-06-21 RX ORDER — OXYCODONE HCL 5 MG/5 ML
5 SOLUTION, ORAL ORAL EVERY 6 HOURS PRN
Status: DISCONTINUED | OUTPATIENT
Start: 2024-06-21 | End: 2024-06-21 | Stop reason: HOSPADM

## 2024-06-21 RX ORDER — SODIUM CHLORIDE, SODIUM LACTATE, POTASSIUM CHLORIDE, CALCIUM CHLORIDE 600; 310; 30; 20 MG/100ML; MG/100ML; MG/100ML; MG/100ML
100 INJECTION, SOLUTION INTRAVENOUS CONTINUOUS
Status: DISCONTINUED | OUTPATIENT
Start: 2024-06-21 | End: 2024-06-21 | Stop reason: HOSPADM

## 2024-06-21 RX ORDER — APREPITANT 40 MG/1
40 CAPSULE ORAL ONCE
Status: COMPLETED | OUTPATIENT
Start: 2024-06-21 | End: 2024-06-21

## 2024-06-21 RX ORDER — HYDRALAZINE HYDROCHLORIDE 20 MG/ML
5 INJECTION INTRAMUSCULAR; INTRAVENOUS EVERY 30 MIN PRN
Status: DISCONTINUED | OUTPATIENT
Start: 2024-06-21 | End: 2024-06-21 | Stop reason: HOSPADM

## 2024-06-21 RX ORDER — FENTANYL CITRATE 50 UG/ML
25 INJECTION, SOLUTION INTRAMUSCULAR; INTRAVENOUS EVERY 5 MIN PRN
Status: DISCONTINUED | OUTPATIENT
Start: 2024-06-21 | End: 2024-06-21 | Stop reason: HOSPADM

## 2024-06-21 RX ORDER — ONDANSETRON HYDROCHLORIDE 2 MG/ML
INJECTION, SOLUTION INTRAVENOUS AS NEEDED
Status: DISCONTINUED | OUTPATIENT
Start: 2024-06-21 | End: 2024-06-21

## 2024-06-21 RX ORDER — EPINEPHRINE 1 MG/ML
INJECTION INTRAMUSCULAR; INTRAVENOUS; SUBCUTANEOUS AS NEEDED
Status: DISCONTINUED | OUTPATIENT
Start: 2024-06-21 | End: 2024-06-21 | Stop reason: HOSPADM

## 2024-06-21 RX ORDER — LIDOCAINE HYDROCHLORIDE 40 MG/ML
SOLUTION TOPICAL AS NEEDED
Status: DISCONTINUED | OUTPATIENT
Start: 2024-06-21 | End: 2024-06-21

## 2024-06-21 RX ORDER — DEXAMETHASONE SODIUM PHOSPHATE 100 MG/10ML
INJECTION INTRAMUSCULAR; INTRAVENOUS AS NEEDED
Status: DISCONTINUED | OUTPATIENT
Start: 2024-06-21 | End: 2024-06-21 | Stop reason: HOSPADM

## 2024-06-21 RX ORDER — ONDANSETRON 4 MG/1
8 TABLET, FILM COATED ORAL EVERY 8 HOURS PRN
Qty: 10 TABLET | Refills: 0 | Status: SHIPPED | OUTPATIENT
Start: 2024-06-21

## 2024-06-21 RX ORDER — MIDAZOLAM HYDROCHLORIDE 1 MG/ML
INJECTION, SOLUTION INTRAMUSCULAR; INTRAVENOUS AS NEEDED
Status: DISCONTINUED | OUTPATIENT
Start: 2024-06-21 | End: 2024-06-21

## 2024-06-21 RX ORDER — ACETAMINOPHEN 325 MG/1
650 TABLET ORAL EVERY 4 HOURS PRN
Status: DISCONTINUED | OUTPATIENT
Start: 2024-06-21 | End: 2024-06-21 | Stop reason: HOSPADM

## 2024-06-21 RX ORDER — ONDANSETRON HYDROCHLORIDE 2 MG/ML
4 INJECTION, SOLUTION INTRAVENOUS ONCE AS NEEDED
Status: COMPLETED | OUTPATIENT
Start: 2024-06-21 | End: 2024-06-21

## 2024-06-21 RX ORDER — ALBUTEROL SULFATE 0.83 MG/ML
2.5 SOLUTION RESPIRATORY (INHALATION) ONCE AS NEEDED
Status: DISCONTINUED | OUTPATIENT
Start: 2024-06-21 | End: 2024-06-21 | Stop reason: HOSPADM

## 2024-06-21 RX ORDER — PROPOFOL 10 MG/ML
INJECTION, EMULSION INTRAVENOUS AS NEEDED
Status: DISCONTINUED | OUTPATIENT
Start: 2024-06-21 | End: 2024-06-21

## 2024-06-21 RX ORDER — FENTANYL CITRATE 50 UG/ML
INJECTION, SOLUTION INTRAMUSCULAR; INTRAVENOUS AS NEEDED
Status: DISCONTINUED | OUTPATIENT
Start: 2024-06-21 | End: 2024-06-21

## 2024-06-21 RX ORDER — FENTANYL CITRATE 50 UG/ML
50 INJECTION, SOLUTION INTRAMUSCULAR; INTRAVENOUS EVERY 5 MIN PRN
Status: DISCONTINUED | OUTPATIENT
Start: 2024-06-21 | End: 2024-06-21 | Stop reason: HOSPADM

## 2024-06-21 ASSESSMENT — PAIN SCALES - GENERAL
PAINLEVEL_OUTOF10: 8
PAINLEVEL_OUTOF10: 4
PAINLEVEL_OUTOF10: 6
PAINLEVEL_OUTOF10: 3
PAINLEVEL_OUTOF10: 4

## 2024-06-21 ASSESSMENT — ENCOUNTER SYMPTOMS
PSYCHIATRIC NEGATIVE: 1
FEVER: 0
CARDIOVASCULAR NEGATIVE: 1

## 2024-06-21 ASSESSMENT — COLUMBIA-SUICIDE SEVERITY RATING SCALE - C-SSRS
6. HAVE YOU EVER DONE ANYTHING, STARTED TO DO ANYTHING, OR PREPARED TO DO ANYTHING TO END YOUR LIFE?: NO
1. IN THE PAST MONTH, HAVE YOU WISHED YOU WERE DEAD OR WISHED YOU COULD GO TO SLEEP AND NOT WAKE UP?: NO
2. HAVE YOU ACTUALLY HAD ANY THOUGHTS OF KILLING YOURSELF?: NO

## 2024-06-21 ASSESSMENT — PAIN - FUNCTIONAL ASSESSMENT
PAIN_FUNCTIONAL_ASSESSMENT: 0-10

## 2024-06-21 NOTE — DISCHARGE INSTRUCTIONS
".  Postoperative Care Instructions:  Microdirect Laryngoscopy/Bronchoscopy with Laser Surgery for Laryngeal Mass/Lesions    Postoperative Care:  For your surgery, special microscopic instruments were used and an operating telescope was placed in your mouth to look at your vocal cords and trachea to treat your airway. No external incisions made.      It is normal for your voice to be hoarse for several days or weeks after surgery while the healing process occurs.     Your surgeon may also ask you to perform \"Voice rest\" which means no speaking for the period of time requested. Once you are allowed to start talking, it is very important to use a “conservative” or “confidential voice” after surgery to allow your voice to recover.   This means that you are using your normal voice at a much lower volume than usual, without any straining, yelling, screaming, or singing.  This typically sounds like a soft or breathy whisper.  It is also important to avoid extended periods of voice use.   Do not speak to anyone who is farther than an arm's length away, as this would require you to raise your voice.      It is very important to avoid excessive coughing or throat clearing after surgery, as this will slow down the healing process.  If you have an urge to cough that you cannot control on your own with relaxation techniques, you can purchase an over-the-counter cough suppressant to use, but make sure it does not interact with your other medications.      Finally, make sure to drink plenty of water to stay well hydrated after surgery, as this will help to speed your recovery by keeping your secretions thin and your vocal cords moist.  Use of cough lozenges is also allowed.      Diet: You may resume your normal diet after surgery. You may want to start out with liquids and light meals or soft foods and advance to your usual diet as tolerated.     Our Nurse will contact you a few days after surgery to schedule your follow up " appointment, which is typically 3-6 weeks after surgery.  For any questions or concerns, please call the respective office between the hours of 9am-4pm.   Please call:  Dr. North's office @ [phone]  Dr. Burdick's office @ [phone]   Dr. Good's office @ [phone]  If issues arise over the weekend or after hours, please call our hospital  at [phone] and ask for the ENT resident on call.    What to Expect Following Surgery:    The following are some symptoms that may follow your recent surgery:    Pain - Some mild pain is normal after surgery.  As adequate pain control is necessary for healing, please take over-the-counter Tylenol or Motrin per the package directions as needed for pain.  Occasionally, a narcotic pain medication is prescribed for your use after surgery.  If this is the case, please take the medication only as directed.  You may need to take an over-the-counter stool softener as narcotic pain medications can cause constipation. Massage, cold packs, relaxation techniques, listening to music, and positive thinking will also help control your postoperative pain.    Tylenol 1000 mg 4x/day  Motrin 800 mg 3x/day    Call if this does not adequately cover pain.     Taste disturbance and/or tongue numbness - Due to the surgical instruments used during surgery, you may experience tongue numbness and/or taste disturbance after surgery, but this is usually temporary.  It may take 1-4 weeks to completely resolve.  It is also normal for your tongue to feel swollen or bruised after surgery, and this will also resolve in a few days.    Bleeding - For a few days after surgery, it is normal to cough up some blood in your mucus.  However, if you experience continuous bright red bleeding from your mouth or if you are coughing up blood clots, please call your doctor's office immediately.  If you are on any blood thinning medications, such as Aspirin, Plavix, or Coumadin, you may restart them  immediately after surgery unless you were specifically told not to do so by your surgeon.    Muscle aches/soreness - You may experience generalized muscle aches and/or soreness after surgery, and this is a normal effect of anesthesia.  They should completely resolve after a few days.     Swelling/throat tightness - If you were given steroid medication, this can help with swelling and should be taken as directed. The side effects from steroid use is typically minimal when taken for short periods, as used in these cases. If you have questions, please discuss with your pharmacist.

## 2024-06-21 NOTE — ANESTHESIA PREPROCEDURE EVALUATION
Patient: Rosibel Staton    Procedure Information       Date/Time: 06/21/24 0900    Procedure: Microlaryngoscopy; Bronchoscopy; KTP Laser; Biopsy; Injection    [place] [facility] [place] [facility] [place] [facility] [place] [facility] [place]    Surgeons: Ulices North MD            Relevant Problems   Anesthesia   (+) Delayed emergence from anesthesia   (+) PONV (postoperative nausea and vomiting)      Cardiac   (+) Breast pain, left   (+) Hyperlipidemia      Neuro   (+) Anxiety   (+) Cervical radiculitis   (+) Chronic daily headache   (+) Chronic migraine with aura   (+) Depression   (+) Generalized anxiety disorder   (+) Moderate episode of recurrent major depressive disorder (Multi)   (+) PTSD (post-traumatic stress disorder)      /Renal   (+) Kidney stones, calcium oxalate   (+) Nephrolithiasis   (+) UTI (urinary tract infection)      HEENT   (+) Chronic sinus infection   (+) Seasonal allergies      ID   (+) Bacterial overgrowth syndrome   (+) Bacterial vaginosis   (+) Disease due to severe acute respiratory syndrome coronavirus 2 (SARS-CoV-2)   (+) Oral thrush   (+) Skin infection   (+) UTI (urinary tract infection)   (+) Yeast infection       Clinical information reviewed:   Tobacco  Allergies  Meds   Med Hx  Surg Hx   Fam Hx          NPO Detail:  NPO/Void Status  Date of Last Liquid: 06/21/24  Time of Last Liquid: 0630  Date of Last Solid: 06/20/24  Time of Last Solid: 2350  Last Intake Type: Clear fluids  Time of Last Void: 0700         Physical Exam    Airway  Mallampati: III  TM distance: >3 FB  Neck ROM: full     Cardiovascular    Dental - normal exam     Pulmonary    Abdominal          Anesthesia Plan    History of general anesthesia?: yes  History of complications of general anesthesia?: no    ASA 2     general     intravenous induction   Anesthetic plan and risks discussed with patient.    Plan discussed with CAA.

## 2024-06-21 NOTE — OP NOTE
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]     Procedures  Microlaryngoscopy; Bronchoscopy; KTP Laser; Biopsy; Injection  30420 - IA LARYNGOSCOPY W/WO TRACHEOSCOPY W/MICRO/TELESCOPE    IA LARYNGOSCOPY W/BIOPSY MICROSCOPE/TELESCOPE [01021]  IA LARGSC EXC JAMES&/STRPG CORDS/EPIGL MCRSCP/TLSCP [13366]  IA LARGSC W/NJX VOCAL CORD THER W/MICRO/TELESCOPE [23491]  IA BRNCHSC INCL FLUOR GDNCE DX W/CELL WASHG SPX [78922]  Surgeons      * Ulices North - Primary    Resident/Fellow/Other Assistant:  Surgeons and Role:  * No surgeons found with a matching role *    Procedure Summary  Anesthesia: General  ASA: II  Anesthesia Staff: Anesthesiologist: Emma Ramirez DO  C-AA: ANDREAS Disla  Estimated Blood Loss: <5 mL  Intra-op Medications:   Administrations occurring from 0900 to 1030 on 24:   Medication Name Total Dose   EPINEPHrine (Adrenalin) injection 1 mg   dexAMETHasone (Decadron) injection 5 mg   sodium chloride 0.9 % irrigation solution 500 mL   lactated Ringer's infusion Cannot be calculated              Anesthesia Record               Intraprocedure I/O Totals          Intake    lactated Ringer's infusion 300.00 mL    Total Intake 300 mL          Specimen:   ID Type Source Tests Collected by Time   1 : left vocal cord ulcer Tissue VOCAL CORD BIOPSY LEFT SURGICAL PATHOLOGY EXAM Ulices North MD 2024 0913        Staff:   Circulator: Benito Badillo Person: Jesenia    Implants:  Implants       Type Name Action Serial No.      ENT Implant IMPLANT, PROLARYN GEL, 1.0CC W/NEEDLES - [id number] Implanted               Findings: Left TVC ulcer - excised via microflap.  Right sulcus vocalis.  Treated surface with KTP laser.  Injection  of decadron 10mg/ml bilaterally (0.5 ml) Injected 0.3 prolaryn gel bilaterally. Bronch negative for mass or lesion.     Indications: Rosibel Staton is an 39 y.o. 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     The patient was seen in the preoperative area. The risks, benefits, complications, treatment options, non-operative alternatives, expected recovery and outcomes were discussed with the patient. The possibilities of reaction to medication, pulmonary aspiration, injury to surrounding structures, bleeding, recurrent infection, the need for additional procedures, failure to diagnose a condition, and creating a complication requiring transfusion or operation were discussed with the patient. The patient concurred with the proposed plan, giving informed consent.  The site of surgery was properly noted/marked if necessary per policy. The patient has been actively warmed in preoperative area. Preoperative antibiotics are not indicated. Venous thrombosis prophylaxis have been ordered including bilateral sequential compression devices    Indications: History of voice changes and TVC changes consistent with a benign vocal fold lesion but patient is a smoker.  + contralateral mucosal changes.  Poor voice quality and increased effort/fatigue.  Discussed risks, benefits and alternatives and patient wished to proceed with surgical excisional biopsy.   Informed consent was obtained and the patient was taken to the OR.     Procedure in detail: The patient was seen and identified in the preoperative holding area and at that time further questions were answered. The patient was escorted to the operating suite, transferred to the operating table in a supine position. A huddle was taken, verifying the correct patient, surgical site, and procedure. The anesthesia team provided general anesthesia with a 5-0 laser safe endotracheal tube inserted. The patient was turned 90 degrees towards the ENT team.     Prior  to the start of the case, a pre-incision pause was taken to again verify the correct patient, procedure and surgical site. In addition, a laser safety time out was performed prior to use of the laser.  Appropriate eye and face protections was applied to the patient and the operating room staff prior to laser use.  The oxygen level was also at or below 30-35% for the entirety of the laser portions of the case.      A dental guard was placed on the upper dentition.  The Dedo Laryngoscope was introduced transorally along the right and left sides of the tongue.  No concerning masses or lesions were identified in the oral cavity, oropharynx, or hypopharynx. The glottis was exposed but anterior cricoid pressure was needed to expose the anterior commissure.  The laryngoscope was secured on the mustard stand and a 0° telescope was utilized to inspect the airway. The above findings were visualized. Photodocumentation was obtained.      The microscope was moved in position, and utilized for a majority of the below procedure. Attention was first directed to the left true vocal fold.  An injection of 0.2 ml of preservative free saline was performed into the area just below the lesion.  This afforded improved visualization of the mucosal changes and mass effect.  A sudha knife was utilized to create a microflap which was elevated paying close attention to preserving as much SLP as possible. A variety of cold instruments were used to include flap elevators, Bouchier forceps, curved and up biting scissors to excise the lesion.   The specimen was sent for permanent pathology.     The contralateral vocal fold was treated with KTP laser.  The left side was also treated to obtain a straight edge of the vocal fold.     Hemostasis was confirmed at the end of the case after placement of 1:1000 epi pledgets. The telescope used to inspect the undersurface of the vocal folds and the proximal trachea down to the stan. There were no  concerning areas below the subglottis.  Photodocumentation was obtained.        Prolaryn gel was injected bilaterally to help with healing.     This then concluded the operative portion of the procedure and all instruments were removed from the patient. Sponge, needle, instrument counts were reported as correct. The patient was then turned back to anesthesia for awakening and extubation. There were no complications. The patient was transported to the post-anesthesia care unit in stable condition.     Dr. North was present for and participated in all critical aspects of the procedure.     Complications:  None; patient tolerated the procedure well.    Disposition: PACU - hemodynamically stable.  Condition: stable     Attending Attestation: I performed the procedure.    Ulices North  Phone Number: [phone]

## 2024-06-21 NOTE — H&P
History Of Present Illness  Rosibel Staton is a 39 y.o. female presenting with a history of voice changes with findings on stroboscopy suggestive of an ulcerative laryngitis.       She has leukoplakia bilaterally with some centralized erythema on the left.  Has some Yandel's Edema changes on the superior right vocal cord more towards the posterior half.  We had a discussion in regards to trial of antifungal that she has had antibiotics recently for sinusitis or consideration of a treatment in the operating room with KTP laser ablation.  Discussing the risks, benefits and alternatives she wishes to proceed with a KTP laser ablation in the OR.  This will allow us to perform a biopsy as well as treat the Yandel's Edema and leukoplakia/ulcerative laryngitis.  She is also working on smoking cessation.  She would like to do this case after her 39th birthday which occurs on [birth year].                   .     Past Medical History  Past Medical History:   Diagnosis Date    Cough, unspecified 04/27/2018    Cough    Endometriosis 03/08/2023    Other cervical disc displacement, unspecified cervical region 10/03/2016    Cervical disc herniation    Personal history of other diseases of the circulatory system     History of hypertension    Personal history of other diseases of the digestive system     History of esophageal reflux    Personal history of other diseases of the musculoskeletal system and connective tissue 05/04/2016    History of herniated intervertebral disc    Personal history of other diseases of the respiratory system 01/06/2017    History of deviated nasal septum    Personal history of other diseases of urinary system     History of bladder problems    Personal history of other infectious and parasitic diseases 03/05/2020    History of candidiasis of mouth    Personal history of other mental and behavioral disorders     History of depression    Personal history of urinary (tract) infections 02/24/2021    History  of urinary tract infection    Snoring     Snoring       Surgical History  Past Surgical History:   Procedure Laterality Date    OTHER SURGICAL HISTORY  10/24/2016    Drainage Of Pelvic Abscess    OTHER SURGICAL HISTORY  05/25/2022    Oophorectomy bilateral    OTHER SURGICAL HISTORY  07/16/2019    Laparoscopic hysterectomy    OTHER SURGICAL HISTORY  01/06/2017    Abdominal Surgery for ORS (ovarian remnant syndrome) following hysterectomy        Social History  She reports that she quit smoking about 3 months ago. Her smoking use included cigarettes. She has never used smokeless tobacco. She reports current alcohol use. She reports current drug use. Drug: Marijuana.    Family History  Family History   Problem Relation Name Age of Onset    Breast cancer Mother      Stroke Father      Diabetes Father      Breast cancer Maternal Grandmother          Allergies  Nortriptyline, Amoxicillin-pot clavulanate, Azithromycin, Bacitracin zinc-polymyxin b, Ciprofloxacin, Citalopram, Duloxetine, Gabapentin, Gadolinium-containing contrast media, Lithium, Metaxalone, Nickel, Nitrofurantoin monohyd/m-cryst, Prednisone, Quetiapine, Scopolamine, Sertraline, Sulfamethoxazole-trimethoprim, Tramadol, and Qulipta [atogepant]    Review of Systems   Constitutional:  Negative for fever.   Cardiovascular: Negative.  Negative for chest pain.   Genitourinary: Negative.    Skin: Negative.    Psychiatric/Behavioral: Negative.     All other systems reviewed and are negative.       Physical Exam  Constitutional:       General: She is not in acute distress.     Appearance: She is not ill-appearing.   HENT:      Right Ear: External ear normal.      Left Ear: External ear normal.      Nose: Nose normal.      Mouth/Throat:      Mouth: Mucous membranes are moist.   Eyes:      Pupils: Pupils are equal, round, and reactive to light.   Pulmonary:      Effort: Pulmonary effort is normal.   Skin:     General: Skin is warm and dry.   Neurological:      General:  No focal deficit present.   Psychiatric:         Mood and Affect: Mood normal.          Assessment/Plan   Principal Problem:    Leukoplakia of larynx      Patient and I discussed the risks, benefits and alternatives to surgery and all questions answered.  Cleared to OR.      Ulices North MD

## 2024-06-25 DIAGNOSIS — G89.18 POST-OP PAIN: ICD-10-CM

## 2024-06-25 RX ORDER — CLINDAMYCIN HYDROCHLORIDE 150 MG/1
150 CAPSULE ORAL EVERY 6 HOURS
Qty: 40 CAPSULE | Refills: 0 | Status: SHIPPED | OUTPATIENT
Start: 2024-06-25 | End: 2024-07-05

## 2024-06-25 RX ORDER — OXYCODONE HYDROCHLORIDE 5 MG/1
5 TABLET ORAL EVERY 6 HOURS PRN
Qty: 15 TABLET | Refills: 0 | Status: SHIPPED | OUTPATIENT
Start: 2024-06-25 | End: 2024-06-25 | Stop reason: SDUPTHER

## 2024-06-25 RX ORDER — OXYCODONE HYDROCHLORIDE 5 MG/1
5 TABLET ORAL EVERY 6 HOURS PRN
Qty: 15 TABLET | Refills: 0 | Status: SHIPPED | OUTPATIENT
Start: 2024-06-25 | End: 2024-07-02

## 2024-07-01 LAB
LABORATORY COMMENT REPORT: NORMAL
PATH REPORT.FINAL DX SPEC: NORMAL
PATH REPORT.GROSS SPEC: NORMAL
PATH REPORT.RELEVANT HX SPEC: NORMAL
PATH REPORT.TOTAL CANCER: NORMAL

## 2024-07-03 ENCOUNTER — OFFICE VISIT (OUTPATIENT)
Dept: PRIMARY CARE | Facility: CLINIC | Age: 39
End: 2024-07-03
Payer: MEDICARE

## 2024-07-03 VITALS
WEIGHT: 135 LBS | BODY MASS INDEX: 21.69 KG/M2 | DIASTOLIC BLOOD PRESSURE: 76 MMHG | HEART RATE: 98 BPM | SYSTOLIC BLOOD PRESSURE: 120 MMHG | HEIGHT: 66 IN

## 2024-07-03 DIAGNOSIS — N76.0 BACTERIAL VAGINOSIS: Primary | ICD-10-CM

## 2024-07-03 DIAGNOSIS — B96.89 BACTERIAL VAGINOSIS: Primary | ICD-10-CM

## 2024-07-03 DIAGNOSIS — B37.31 VAGINAL YEAST INFECTION: ICD-10-CM

## 2024-07-03 PROCEDURE — 99214 OFFICE O/P EST MOD 30 MIN: CPT | Performed by: STUDENT IN AN ORGANIZED HEALTH CARE EDUCATION/TRAINING PROGRAM

## 2024-07-03 RX ORDER — METRONIDAZOLE 500 MG/1
500 TABLET ORAL 2 TIMES DAILY
Qty: 14 TABLET | Refills: 0 | Status: SHIPPED | OUTPATIENT
Start: 2024-07-03 | End: 2024-07-10

## 2024-07-03 RX ORDER — FLUCONAZOLE 150 MG/1
150 TABLET ORAL ONCE
Qty: 1 TABLET | Refills: 0 | Status: SHIPPED | OUTPATIENT
Start: 2024-07-03 | End: 2024-07-03

## 2024-07-03 NOTE — PROGRESS NOTES
"Subjective   Patient ID: Rosibel Staton is a 39 y.o. female who presents for UTI.    UTI        Bacterial vaginosis/vaginal yeast infection  Patient presents today in office with complaints of sinus symptoms of bacterial vaginosis.  Patient has a history of recurrent BV.  Last was in April 2024 and treated by OB with 7-day course of Flagyl 500 mg twice daily.  Symptoms resolved for at least a month, then reappeared 1 month ago.  After reappearance symptoms improve but not fully resolved until 5 days ago when her symptoms got worse.  Now she complains of white thick discharge and fishy odor coming out from her vagina  Also she complains of pruritus and sensation of discomfort alongside mild suprapubic pain.  Denies any burning with urination or sensation emergency.  Denies any fever or chills  Patient recently had a ENT procedure in her throat for hoarseness and is on day 8 out of 10 of clindamycin 150 mg 4 times a day.    Review of Systems  All pertinent positive symptoms are included in the history of present illness.    All other systems have been reviewed and are negative and noncontributory to this patient's current ailments.  Objective   /76   Pulse 98   Ht 1.676 m (5' 6\")   Wt 61.2 kg (135 lb)   BMI 21.79 kg/m²     Physical Exam  Constitutional:       General: She is not in acute distress.     Appearance: Normal appearance. She is normal weight. She is not ill-appearing.   HENT:      Head: Normocephalic and atraumatic.      Right Ear: External ear normal.      Left Ear: External ear normal.      Nose: Nose normal. No congestion or rhinorrhea.      Mouth/Throat:      Mouth: Mucous membranes are moist.      Pharynx: Oropharynx is clear. No oropharyngeal exudate or posterior oropharyngeal erythema.   Eyes:      General: No scleral icterus.     Extraocular Movements: Extraocular movements intact.      Conjunctiva/sclera: Conjunctivae normal.      Pupils: Pupils are equal, round, and reactive to light. "   Cardiovascular:      Rate and Rhythm: Normal rate and regular rhythm.      Pulses: Normal pulses.      Heart sounds: Normal heart sounds. No murmur heard.  Pulmonary:      Effort: Pulmonary effort is normal. No respiratory distress.      Breath sounds: Normal breath sounds. No wheezing, rhonchi or rales.   Abdominal:      General: Abdomen is flat. Bowel sounds are normal.      Palpations: Abdomen is soft.      Tenderness: There is no abdominal tenderness. There is no guarding or rebound.   Genitourinary:     Comments: Deferred  Musculoskeletal:         General: No deformity. Normal range of motion.      Right lower leg: No edema.      Left lower leg: No edema.   Skin:     General: Skin is warm and dry.      Capillary Refill: Capillary refill takes less than 2 seconds.      Findings: No lesion or rash.   Neurological:      General: No focal deficit present.      Mental Status: She is alert and oriented to person, place, and time. Mental status is at baseline.   Psychiatric:         Mood and Affect: Mood normal.         Behavior: Behavior normal.         Assessment/Plan   Diagnoses and all orders for this visit:  Bacterial vaginosis  -     metroNIDAZOLE (Flagyl) 500 mg tablet; Take 1 tablet (500 mg) by mouth 2 times a day for 7 days.  Vaginal yeast infection  -     fluconazole (Diflucan) 150 mg tablet; Take 1 tablet (150 mg) by mouth 1 time for 1 dose.    Sinus symptoms alongside history more consistent with BV and vaginal yeast infection superimposed, secondary to antibiotic use.  Instructed to start Diflucan in 2-3 days after finishing clindamycin.  Also advised to get some probiotics over-the-counter.  If symptoms do not resolve in 2 to 3 weeks consider referral to OB/GYN for further evaluation and treatment.    Thank you for letting us be a part of your care team.  Please call the office if you have further questions or concerns regarding your care    Liza Mclaughlin MD  PGY2 FM Resident

## 2024-07-09 ENCOUNTER — TREATMENT (OUTPATIENT)
Dept: SPEECH THERAPY | Facility: CLINIC | Age: 39
End: 2024-07-09
Payer: COMMERCIAL

## 2024-07-09 DIAGNOSIS — J38.7 LEUKOPLAKIA OF LARYNX: Primary | ICD-10-CM

## 2024-07-09 DIAGNOSIS — R49.0 DYSPHONIA: ICD-10-CM

## 2024-07-09 PROCEDURE — 92524 BEHAVRAL QUALIT ANALYS VOICE: CPT | Mod: GN | Performed by: SPEECH-LANGUAGE PATHOLOGIST

## 2024-07-09 ASSESSMENT — PAIN SCALES - GENERAL: PAINLEVEL_OUTOF10: 5 - MODERATE PAIN

## 2024-07-09 ASSESSMENT — PAIN DESCRIPTION - DESCRIPTORS: DESCRIPTORS: ACHING

## 2024-07-09 ASSESSMENT — PAIN - FUNCTIONAL ASSESSMENT: PAIN_FUNCTIONAL_ASSESSMENT: 0-10

## 2024-07-09 NOTE — PROGRESS NOTES
Speech-Language Pathology    Voice Evaluation    Patient Name: Rosibel Staton  MRN: [id number]  Today's Date: 7/9/2024     Time Calculation  Start Time: 1600  Stop Time: 1640  Time Calculation (min): 40 min      Current Problem:  Patient Active Problem List   Diagnosis    Abnormal blood chemistry    Arm pain    Arthralgia of right wrist    Bacterial overgrowth syndrome    Bacterial vaginosis    Balance disorder    Body aches    Breast pain, left    Bruising    Cervical facet syndrome    Cervical radiculitis    Chronic left upper quadrant pain    Chronic sinus infection    Contact dermatitis    Cough    Dense breast tissue on mammogram    Dysuria    Eye irritation    Fall from standing    Closed nondisplaced fracture of middle third of scaphoid bone of right wrist    Fatigue    Feeling faint    Foot sprain, right, initial encounter    Generalized anxiety disorder    Headache    Hyperlipidemia    Hypotension    Kidney stones, calcium oxalate    Left breast mass    Lymphadenopathy    Marijuana use    Moderate episode of recurrent major depressive disorder (Multi)    Mood changes    Neck muscle spasm    Nausea in adult    Neck pain    Nephrolithiasis    Nicotine dependence    Oral thrush    Pain, flank, bilateral    Palpitations    Pelvic pain in female    Possible urinary tract infection    Post-operative pain    PTSD (post-traumatic stress disorder)    Right foot pain    Right shoulder pain    Seasonal allergies    Skin infection    Sore throat    Stye    Syncope    UTI (urinary tract infection)    Vasomotor symptoms due to menopause    Vitamin D deficiency    Weight loss    Wound cellulitis    Yeast infection    Hordeolum externum of right upper eyelid    UTI symptoms    Possible exposure to STD    Chronic migraine with aura    Depression    Chronic daily headache    Migraine without aura and without status migrainosus, not intractable    Cervical paraspinal muscle spasm    Acute ankle pain    Anxiety    Calcium  oxalate calculus    Disease due to severe acute respiratory syndrome coronavirus 2 (SARS-CoV-2)    History of depression    History of hypertension    Anomia    Dysphonia    Leukoplakia of larynx    Atrophic vaginitis    PONV (postoperative nausea and vomiting)    Delayed emergence from anesthesia       Voice Assessment:   Patient is a 38 yo female presenting for postoperative vocal rehabilitation. S/p:    Microlaryngoscopy; Bronchoscopy; KTP Laser; Biopsy; Injection     Date: 2024                      OR Location: [facility] OR     Name: Rsoibel Staton, : 1985, Age: 39 y.o., MRN: [id number], Sex: female     Diagnosis  Pre-op Diagnosis     * Leukoplakia of larynx [J38.7] Post-op Diagnosis     * Leukoplakia of larynx [J38.7]      Procedures  Microlaryngoscopy; Bronchoscopy; KTP Laser; Biopsy; Injection  71293 - IA LARYNGOSCOPY W/WO TRACHEOSCOPY W/MICRO/TELESCOPE     IA LARYNGOSCOPY W/BIOPSY MICROSCOPE/TELESCOPE [38208]  IA LARGSC EXC JAMES&/STRPG CORDS/EPIGL MCRSCP/TLSCP [35514]  IA LARGSC W/NJX VOCAL CORD THER W/MICRO/TELESCOPE [[id number]]  IA BRNCHSC INCL FLUOR GDNCE DX W/CELL WASHG SPX [80606]       Here today endorsing good adherence to voice rest. She maintained confidential voicing throughout our session this date and reports consistent use at home and work. 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 Voice has good quality with soft voicing - no pitch breaks present, negative for rasp and strain. Reports throat irritation describing dull ache that worsens with voice use. Patient has been avoiding dry foods instead opting for soft PO such as ice cream or milkshakes. She is very tender on palpation most sig within thyrohyoid space. Benefits from manual release via CLM and neck/upper torso stretching.     Provided her with vocal wellness instruction, RTB, modified voice rest, stretching routine and introductory RVT postures for continued vocal recovery.  Patient will attend voice clinic next week for repeat visual assessment. Continued therapy pending results on next week's exam.       Voice Plan of Care:  Frequency: x1/eowk  Duration: x3 months  Number of Visits: 4-8  Recommendations for therapeutic interventions: Speech/Voice exercises, Vocal hygiene program, Irritable larynx retraining  Prognosis: Good  Factors affecting prognosis: Motivation  Discussed Plan of Care: Patient, Physician, Discussed risks/benefits with patient/caregiver, Patient/caregiver agreeable with Plan of Care      Subjective   Current Problem:   History per Dr. North:  History Of Present Illness  Rosibel Staton is a 39 y.o. female presenting with a history of voice changes with findings on stroboscopy suggestive of an ulcerative laryngitis.       She has leukoplakia bilaterally with some centralized erythema on the left.  Has some Yandel's Edema changes on the superior right vocal cord more towards the posterior half.  We had a discussion in regards to trial of antifungal that she has had antibiotics recently for sinusitis or consideration of a treatment in the operating room with KTP laser ablation.  Discussing the risks, benefits and alternatives she wishes to proceed with a KTP laser ablation in the OR.  This will allow us to perform a biopsy as well as treat the Yandel's Edema and leukoplakia/ulcerative laryngitis.  She is also working on smoking cessation.  She would like to do this case after her 39th birthday which occurs on [birth year].     General Visit Information:  Patient Class: Outpatient  Living Environment: Home  Arrival: Independent  Ordering Physician: Dr. North  Reason for Referral: Dysphonia - postoperative vocal rehabilitation  Referred By: Dr. North  Patient Seen During This Visit: Yes    Objective     Pain Assessment:  Pain Assessment: 0-10  0-10 (Numeric) Pain Score: 5 - Moderate pain  Pain Type: Acute pain  Pain Location: Throat  Pain Descriptors: Aching      Voice Use  Inventory:  Voice misuse/abuse: None  Exposure to Noise: No  Exposure to respiratory irritants: No  Consistent use of singing voice: No  Occupation relies on speaking voice: Yes    Patient Self Assessment:  Daily water intake: Yes  Daily caffeine intake: Yes  Alcohol intake: Yes  Smoking history: Yes (quit, now with no tobacco use and using marijuanna edibles for anxiety and pain management)  Reflux history: No    Voice Objective:  Motor Speech Production: WFL  Pitch: WFL  Voice Quality: Harsh  Fluency: WFL  Prosody: WFL  Resonance: WFL  Loudness: WFL      Voice Analysis:   Preoperative voice exam:               Voice Treatment:  Individual(s) Educated: Patient  Verbal Education: Risks/benefits of therapy, Results of testing, Exercises  Written Education Provided: Exercises  Response to Education: Verbalized understanding, Demonstrated understanding, Needs review  Patient/Caregiver Verbalized Understanding and Agreement: Yes      Active       Voice        SLP Goal 1 (Progressing)       Start:  04/09/24    Expected End:  08/09/24       Patient will increase vocal wellness and decrease phonotrauma in adherence with clinician prescribed vocal hygiene and wellness program per patient report.          SLP Goal 2 (Progressing)       Start:  04/09/24    Expected End:  08/09/24       Patient will increase ability to produce voice without tension within 5 minute conversational task x80% accuracy as judged by clinician observation and/or patient report.          SLP Goal 3 (Progressing)       Start:  04/09/24    Expected End:  08/09/24       Patient will demonstrate independent use of voice/breathing techniques x80% accuracy.             Time In: 1600  Time Out: 1640

## 2024-07-12 ENCOUNTER — SPECIALTY PHARMACY (OUTPATIENT)
Dept: PHARMACY | Facility: CLINIC | Age: 39
End: 2024-07-12

## 2024-07-12 PROCEDURE — RXMED WILLOW AMBULATORY MEDICATION CHARGE

## 2024-07-15 ENCOUNTER — PHARMACY VISIT (OUTPATIENT)
Dept: PHARMACY | Facility: CLINIC | Age: 39
End: 2024-07-15
Payer: COMMERCIAL

## 2024-07-17 ENCOUNTER — APPOINTMENT (OUTPATIENT)
Dept: BEHAVIORAL HEALTH | Facility: CLINIC | Age: 39
End: 2024-07-17
Payer: MEDICARE

## 2024-07-17 DIAGNOSIS — F41.1 GENERALIZED ANXIETY DISORDER: ICD-10-CM

## 2024-07-17 DIAGNOSIS — F43.10 PTSD (POST-TRAUMATIC STRESS DISORDER): ICD-10-CM

## 2024-07-17 DIAGNOSIS — F33.1 MODERATE EPISODE OF RECURRENT MAJOR DEPRESSIVE DISORDER (MULTI): ICD-10-CM

## 2024-07-17 PROCEDURE — 99214 OFFICE O/P EST MOD 30 MIN: CPT | Performed by: STUDENT IN AN ORGANIZED HEALTH CARE EDUCATION/TRAINING PROGRAM

## 2024-07-17 RX ORDER — ALPRAZOLAM 0.5 MG/1
0.5 TABLET ORAL ONCE AS NEEDED
Qty: 2 TABLET | Refills: 0 | Status: SHIPPED | OUTPATIENT
Start: 2024-07-17
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
## 2024-07-18 ENCOUNTER — APPOINTMENT (OUTPATIENT)
Dept: OTOLARYNGOLOGY | Facility: CLINIC | Age: 39
End: 2024-07-18
Payer: COMMERCIAL

## 2024-07-18 VITALS — TEMPERATURE: 97.2 F | BODY MASS INDEX: 22.4 KG/M2 | WEIGHT: 138.8 LBS

## 2024-07-18 DIAGNOSIS — R49.8 VOICE FATIGUE: ICD-10-CM

## 2024-07-18 DIAGNOSIS — J38.7 LEUKOPLAKIA OF LARYNX: Primary | ICD-10-CM

## 2024-07-18 DIAGNOSIS — R49.0 VOICE HOARSENESS: ICD-10-CM

## 2024-07-18 DIAGNOSIS — F41.9 ANXIETY: ICD-10-CM

## 2024-07-18 DIAGNOSIS — R49.8 OTHER VOICE AND RESONANCE DISORDERS: ICD-10-CM

## 2024-07-18 DIAGNOSIS — J38.3 VOCAL FOLD SCAR: ICD-10-CM

## 2024-07-18 PROCEDURE — 99214 OFFICE O/P EST MOD 30 MIN: CPT | Performed by: OTOLARYNGOLOGY

## 2024-07-18 PROCEDURE — 31579 LARYNGOSCOPY TELESCOPIC: CPT | Performed by: OTOLARYNGOLOGY

## 2024-07-18 ASSESSMENT — PATIENT HEALTH QUESTIONNAIRE - PHQ9
2. FEELING DOWN, DEPRESSED OR HOPELESS: NOT AT ALL
1. LITTLE INTEREST OR PLEASURE IN DOING THINGS: NOT AT ALL
SUM OF ALL RESPONSES TO PHQ9 QUESTIONS 1 AND 2: 0

## 2024-07-18 NOTE — PROGRESS NOTES
ASSESSMENT AND PLAN:   Rosibel Staton is a 39 y.o. female with a history of leukoplakia of TVC.      Pathology Dx: Leukoplakia of larynx         FINAL DIAGNOSIS   Left vocal cord, biopsy:  - Mild squamous dysplasia.          Mild squamous dysplasia  - Dysplasia is a pre-cancer, not a true cancer. No evidence of cancer or leukoplakia on examination. Healing observed.  - Six month follow up for another scope to ensure leukoplakia is not returning. Continue to improve voice. No smoking. Consider other forms of cannabis consumption that do not involve smoking, such as edibles or tinctures.    Anxiety  - Patient uses cannabis for anxiety.  - Consider other forms of cannabis consumption that do not involve smoking, such as edibles or tinctures.       Reason For Consult  Chief Complaint   Patient presents with    Post-op Visit     Reports infection post op that was treated with antibiotics, also reports feeling of fluid in the ear        HISTORY OF PRESENT ILLNESS:  Rosibel Staton is a 39 y.o. female presenting for a follow up visit with me for leukoplakia of bilateral TVC.      39-year-old female with a history of a recent left vocal cord biopsy with KTP laser ablation on 06/21/2024. Found to have mild squamous dysplasia on the left micro flap tissue sample that was sent. KTP laser ablation was performed bilaterally. Reports feeling sore. Voice is slowly coming back. Denies smoking. Reports using marijuana for anxiety.  Using edibles but not getting the full anti-anxiety effect.       Prior History:   Left vocal cord, biopsy + KTP: 6/21/24  - Mild squamous dysplasia. Left via microflap, right KTP only.       NPV seen by ERVIN Bar.  Hx of chronic rhinitis and hoarseness.  Reports recent illness, marijuana use and scope exam revealing of edema to the true vocal cords and a left vocal cord gap.  Prior lithium use.    + Chronic sinusitis on [facility] at [facility].      PMHx: rhinitis, sinusitis, voice changes, cervical  radiculitis, chronic migraine, anxiety, JEREMIAH, fatigue, depression, endometriosis, HLD, MDD, nicotine use, marijuana use, PTSD, syncope, vit D def     Past Medical History  She has a past medical history of Cough, unspecified (04/27/2018), Endometriosis (03/08/2023), Other cervical disc displacement, unspecified cervical region (10/03/2016), Personal history of other diseases of the circulatory system, Personal history of other diseases of the digestive system, Personal history of other diseases of the musculoskeletal system and connective tissue (05/04/2016), Personal history of other diseases of the respiratory system (01/06/2017), Personal history of other diseases of urinary system, Personal history of other infectious and parasitic diseases (03/05/2020), Personal history of other mental and behavioral disorders, Personal history of urinary (tract) infections (02/24/2021), and Snoring. Surgical History  She has a past surgical history that includes Other surgical history (10/24/2016); Other surgical history (05/25/2022); Other surgical history (07/16/2019); and Other surgical history (01/06/2017).   Social History  She reports that she quit smoking about 4 months ago. Her smoking use included cigarettes. She has never used smokeless tobacco. She reports current alcohol use. She reports current drug use. Drug: Marijuana. Allergies  Nortriptyline, Amoxicillin-pot clavulanate, Azithromycin, Bacitracin zinc-polymyxin b, Ciprofloxacin, Citalopram, Duloxetine, Gabapentin, Gadolinium-containing contrast media, Lithium, Metaxalone, Nickel, Nitrofurantoin monohyd/m-cryst, Prednisone, Quetiapine, Scopolamine, Sertraline, Sulfamethoxazole-trimethoprim, Tramadol, and Qulipta [atogepant]     Family History  Family History   Problem Relation Name Age of Onset    Breast cancer Mother      Stroke Father      Diabetes Father      Breast cancer Maternal Grandmother         Review of Systems  All 10 systems were reviewed and  negative except for above.      Last Recorded Vitals  Temperature 36.2 °C (97.2 °F), weight 63 kg (138 lb 12.8 oz).    Physical Exam  ENT Physical Exam  Constitutional  Appearance: patient appears well-developed and well-nourished,  Head and Face  Appearance: head appears normal and face appears normal;  Ear  Auricles: right auricle normal; left auricle normal;  Nose  External Nose: nares patent bilaterally;  Oral Cavity/Oropharynx  Lips: normal;  Neck  Neck: neck normal; neck palpation normal;  Respiratory  Inspection: no retractions visible;  Cardiovascular  Inspection: no peripheral edema present;  Neurovestibular  Mental Status: alert and oriented;  Psychiatric: mood normal;  Cranial Nerves: cranial nerves intact;        Nasal congestion noted. Vocal cords examined using a scope and strobe light. Healing observed. No evidence of cancer or leukoplakia. Slight anterior gap on the right.     Stroboscopy findings: GRBAS: grade is one, roughness is zero, brethine is one, asthenia is one, and strain is two. Intelligibility is Normal, residence is balanced, vocal loudness is reduced, breath support is decreased. Subglottic edema is absent, thick mucus is absent, granuloma is absent, ventricular obliteration is partial. Erythema is absent. Interretinoin thickening is mild, vocal fold edema is mild, and diffuse laryngitis is none. Rust retinoids were symmetric. Retinoid height was Normal. Stribulic tension was lateral. Aasymmetry was mildly asymmetric with amplitude reduced on the right. Mild restriction on the right and no restriction on the left. Small anterior glottic gap.      Relevant Results       Patient Reported Outcome Measures         Radiology, Laboratory and Pathology  No results found.      Procedures     Time Spent  Prep time on day of patient encounter: 10 minutes  Time spent directly with patient, family or caregiver: 15 minutes  Additional Time Spent on Patient Care Activities/Discussion with SLP re care  plan: 5 minutes  Documentation Time: 10 minutes  Other Time Spent: 0 minutes  Total: 40 minutes

## 2024-08-08 ENCOUNTER — TELEMEDICINE (OUTPATIENT)
Dept: PRIMARY CARE | Facility: CLINIC | Age: 39
End: 2024-08-08
Payer: COMMERCIAL

## 2024-08-08 DIAGNOSIS — M79.89 SWELLING OF LEFT HAND: ICD-10-CM

## 2024-08-08 DIAGNOSIS — T63.441A BEE STING REACTION, ACCIDENTAL OR UNINTENTIONAL, INITIAL ENCOUNTER: Primary | ICD-10-CM

## 2024-08-08 DIAGNOSIS — T63.441A BEE STING REACTION, ACCIDENTAL OR UNINTENTIONAL, INITIAL ENCOUNTER: ICD-10-CM

## 2024-08-08 PROCEDURE — 99214 OFFICE O/P EST MOD 30 MIN: CPT | Performed by: STUDENT IN AN ORGANIZED HEALTH CARE EDUCATION/TRAINING PROGRAM

## 2024-08-08 RX ORDER — DOXYCYCLINE 100 MG/1
100 CAPSULE ORAL 2 TIMES DAILY
Qty: 14 CAPSULE | Refills: 0 | Status: SHIPPED | OUTPATIENT
Start: 2024-08-08 | End: 2024-08-15

## 2024-08-08 RX ORDER — PREDNISONE 20 MG/1
40 TABLET ORAL DAILY
Qty: 10 TABLET | Refills: 0 | Status: SHIPPED | OUTPATIENT
Start: 2024-08-08 | End: 2024-08-08

## 2024-08-08 RX ORDER — PREDNISONE 20 MG/1
40 TABLET ORAL DAILY
Qty: 10 TABLET | Refills: 0 | Status: SHIPPED | OUTPATIENT
Start: 2024-08-08 | End: 2024-08-13

## 2024-08-08 NOTE — PROGRESS NOTES
Subjective   Patient ID: Rosibel Staton is a 39 y.o. female who presents for Yellow Jacket Sting.    HPI   Patient is calling to the office today to discuss signs/symptoms of a yellowjacket sting on her left hand    Stated that this just occurred yesterday morning unfortunate has noticed continued swelling and even pain/redness on her left hand    She has been using Benadryl to help with her signs/symptoms and even stated that she did pull out the stinger    Due to her hand now swelling up and slightly worsening, she is asking to discuss this further at today's visit    Also stated that she denies any shortness of breath, chest pain, difficulty breathing, or any other anaphylactic signs/symptoms    Does not have an allergic history to bees in the past    Review of Systems  All pertinent positive symptoms are included in the history of present illness.    All other systems have been reviewed and are negative and noncontributory to this patient's current ailments.    Objective   There were no vitals taken for this visit.    Physical Exam  CONSTITUTIONAL - well nourished, well developed, looks like stated age, in no acute distress, not ill-appearing, and not tired appearing  SKIN - normal skin color and pigmentation, normal skin turgor without rash, lesions, or nodules visualized  HEAD - no trauma, normocephalic  EYES - normal external exam  CHEST -no distressed breathing, good effort  EXTREMITIES -left hand noted to be swollen especially on the posterior aspect, slight erythema, limited due to video quality  NEUROLOGICAL - normal balance, normal motor, no ataxia  PSYCHIATRIC - alert, pleasant and cordial, age-appropriate    Assessment/Plan   I did send over prednisone as well as doxycycline to take as prescribed to help with both the pain, swelling, and even possible underlying infection that could have been occurring    Risks, benefits, and options of treatment(s) were discussed after reviewing all current  medication(s) and drug allergy(ies)  I opted for the treatment that we discussed with instructions on the medication use for your underlying medical ailment(s)  I encouraged supportive care such as rest, fluids and Tylenol as warranted  Return to the clinic in 7-10 days or sooner if symptoms worsen or persist as we will then further evaluate    At any point if you notice worsening or acute signs/symptoms please notify me immediately and/or go to nearest emergency room to be acutely evaluated

## 2024-08-12 ENCOUNTER — APPOINTMENT (OUTPATIENT)
Dept: PRIMARY CARE | Facility: CLINIC | Age: 39
End: 2024-08-12
Payer: COMMERCIAL

## 2024-08-12 VITALS
HEART RATE: 74 BPM | WEIGHT: 146 LBS | DIASTOLIC BLOOD PRESSURE: 80 MMHG | BODY MASS INDEX: 23.57 KG/M2 | OXYGEN SATURATION: 97 % | SYSTOLIC BLOOD PRESSURE: 122 MMHG

## 2024-08-12 DIAGNOSIS — T63.441A BEE STING REACTION, ACCIDENTAL OR UNINTENTIONAL, INITIAL ENCOUNTER: Primary | ICD-10-CM

## 2024-08-12 DIAGNOSIS — M79.89 SWELLING OF LEFT HAND: ICD-10-CM

## 2024-08-12 PROCEDURE — 99213 OFFICE O/P EST LOW 20 MIN: CPT | Performed by: STUDENT IN AN ORGANIZED HEALTH CARE EDUCATION/TRAINING PROGRAM

## 2024-08-12 NOTE — PROGRESS NOTES
Subjective   Patient ID: Rosibel Staton is a 39 y.o. female who presents for Hospital Follow-up.  Today she is accompanied by alone.     HPI  ER Follow up   Patient went to ER following visit with our office via telehealth for yellow jacket sting     She felt her throat became itchy and felt that it was becoming increasingly difficult to swallow     At the ER she was treated with IV Fluids, Benadryl, and pepcid.   She was discharged with Pepcid, Zyrtec, and an Epipen to assist with symptomatic relief at home    Currently still bothering her today but only slightly  Noted continued healing and less swelling    Glucose, WBC, and Abs Neutrophils were elevated also wants to discuss this further     Current Outpatient Medications on File Prior to Visit   Medication Sig Dispense Refill    ALPRAZolam (Xanax) 0.5 mg tablet Take 1 tablet (0.5 mg) by mouth 1 time if needed for sleep or anxiety for up to 2 doses. 2 tablet 0    ARIPiprazole (Abilify) 15 mg tablet Take 1 tablet (15 mg) by mouth once daily. 90 tablet 3    atorvastatin (Lipitor) 40 mg tablet Take 1 tablet (40 mg) by mouth once daily at bedtime. 90 tablet 3    clonazePAM (KlonoPIN) 1 mg tablet Take 1 tablet (1 mg) by mouth 3 times a day as needed for anxiety. 90 tablet 2    doxycycline (Vibramycin) 100 mg capsule Take 1 capsule (100 mg) by mouth 2 times a day for 7 days. Take with at least 8 ounces (large glass) of water, do not lie down for 30 minutes after 14 capsule 0    estradiol (Vagifem) 10 mcg tablet vaginal tablet Insert 1 tablet (10 mcg) into the vagina 2 times a week. Insert one tablet into the vagina daily for 14 days then continue using twice weekly 34 tablet 3    hydrOXYzine pamoate (Vistaril) 50 mg capsule Take 1 capsule (50 mg) by mouth every 6 hours if needed for anxiety. 90 capsule 11    melatonin 10 mg tablet Take 2 tablets (20 mg) by mouth once daily at bedtime.      metaxalone (Skelaxin) 800 mg tablet Take 1 tablet (800 mg) by mouth 3 times a  day as needed.      ondansetron (Zofran) 4 mg tablet Take 2 tablets (8 mg) by mouth every 8 hours if needed for nausea or vomiting. 10 tablet 0    ondansetron (Zofran) 8 mg tablet Take 1 tablet (8 mg) by mouth every 12 hours if needed for nausea. 20 tablet 11    predniSONE (Deltasone) 20 mg tablet TAKE 2 TABLETS BY MOUTH ONCE DAILY FOR 5 DAYS 10 tablet 0    propranolol (Inderal) 10 mg tablet Take 1 tablet (10 mg) by mouth 3 times a day. 90 tablet 11    rimegepant (NURTEC) 75 mg tablet,disintegrating Dissolve 1 tablet (75 mg) by mouth on or under the tongue every other day. 16 tablet 11    [DISCONTINUED] atogepant (Qulipta) 60 mg tablet tablet Take 1 tablet (60 mg) by mouth once daily. (Patient not taking: Reported on 6/3/2024) 30 tablet 11    [DISCONTINUED] predniSONE (Deltasone) 20 mg tablet Take 2 tablets (40 mg) by mouth once daily for 5 days. 10 tablet 0     No current facility-administered medications on file prior to visit.        Allergies   Allergen Reactions    Nortriptyline Psychosis    Amoxicillin-Pot Clavulanate Unknown    Azithromycin Unknown    Bacitracin Zinc-Polymyxin B Unknown    Ciprofloxacin Unknown    Citalopram Unknown    Duloxetine Unknown    Gabapentin Unknown    Gadolinium-Containing Contrast Media Unknown    Lithium Unknown    Metaxalone Unknown    Nickel Unknown    Nitrofurantoin Monohyd/M-Cryst Unknown    Prednisone Unknown    Quetiapine Unknown    Qulipta [Atogepant] Drowsiness and Nausea/vomiting    Scopolamine Unknown    Sertraline Unknown    Sulfamethoxazole-Trimethoprim Unknown    Tramadol Unknown       Immunization History   Administered Date(s) Administered    Tdap vaccine, age 7 year and older (BOOSTRIX, ADACEL) 01/15/2014, 10/19/2015         Review of Systems  All pertinent positive symptoms are included in the history of present illness.  All other systems have been reviewed and are negative and noncontributory to this patient's current ailments.     Objective   /80 (BP  "Location: Left arm, Patient Position: Sitting, BP Cuff Size: Adult)   Pulse 74   Wt 66.2 kg (146 lb)   SpO2 97%   BMI 23.57 kg/m²   BSA: 1.76 meters squared  No visits with results within 1 Month(s) from this visit.   Latest known visit with results is:   Admission on 06/21/2024, Discharged on 06/21/2024   Component Date Value Ref Range Status    Case Report 06/21/2024    Final                    Value:Surgical Pathology                                Case: [id number]                                  Authorizing Provider:  Ulices North MD        Collected:           06/21/2024 0913              Ordering Location:     [facility]  Received:            06/24/2024 0910                                     OR                                                                           Pathologist:           Nawaf Mike MD                                                             Specimen:    VOCAL CORD BIOPSY LEFT, left vocal cord ulcer                                              FINAL DIAGNOSIS 06/21/2024    Final                    Value:Left vocal cord, biopsy:                          - Mild squamous dysplasia.                                                    jkw      06/21/2024    Final                    Value:By the signature on this report, the individual or group listed as making                           the Final Interpretation/Diagnosis certifies that they have reviewed this                           case.     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 The specimen is submitted in toto in one cassette.             "              [facility]       Physical Exam  CONSTITUTIONAL - well nourished, well developed, looks like stated age, in no acute distress, not ill-appearing, and not tired appearing  SKIN - normal skin color and pigmentation, normal skin turgor without rash, lesions, or nodules visualized  HEAD - no trauma, normocephalic  EYES - normal external exam  CHEST -no distressed breathing, good effort  EXTREMITIES - no edema, no deformities  NEUROLOGICAL - normal balance, normal motor, no ataxia  PSYCHIATRIC - alert, pleasant and cordial, age-appropriate      Assessment/Plan   ER Follow up  We discussed your recent visit to the ER due to your yellow jacket sting   We reordered a CBC and ordered a A1C to check your levels, please get this done at your earliest convenience.    Continue taking all the medication as currently prescribed and finish them to their entirety    If you have any questions/concerns, please call our office.   Otherwise, we will see you at your next visit.

## 2024-08-22 ENCOUNTER — APPOINTMENT (OUTPATIENT)
Dept: PRIMARY CARE | Facility: CLINIC | Age: 39
End: 2024-08-22
Payer: COMMERCIAL

## 2024-08-22 ENCOUNTER — LAB (OUTPATIENT)
Dept: LAB | Facility: LAB | Age: 39
End: 2024-08-22
Payer: COMMERCIAL

## 2024-08-22 DIAGNOSIS — T63.441A BEE STING REACTION, ACCIDENTAL OR UNINTENTIONAL, INITIAL ENCOUNTER: ICD-10-CM

## 2024-08-22 DIAGNOSIS — M79.89 SWELLING OF LEFT HAND: ICD-10-CM

## 2024-08-22 LAB
ALBUMIN SERPL BCP-MCNC: 4.2 G/DL (ref 3.4–5)
ALP SERPL-CCNC: 55 U/L (ref 33–110)
ALT SERPL W P-5'-P-CCNC: 20 U/L (ref 7–45)
ANION GAP SERPL CALC-SCNC: 10 MMOL/L (ref 10–20)
AST SERPL W P-5'-P-CCNC: 16 U/L (ref 9–39)
BASOPHILS # BLD AUTO: 0.06 X10*3/UL (ref 0–0.1)
BASOPHILS NFR BLD AUTO: 0.5 %
BILIRUB SERPL-MCNC: 0.3 MG/DL (ref 0–1.2)
BUN SERPL-MCNC: 12 MG/DL (ref 6–23)
CALCIUM SERPL-MCNC: 8.8 MG/DL (ref 8.6–10.3)
CHLORIDE SERPL-SCNC: 102 MMOL/L (ref 98–107)
CO2 SERPL-SCNC: 31 MMOL/L (ref 21–32)
CREAT SERPL-MCNC: 0.75 MG/DL (ref 0.5–1.05)
EGFRCR SERPLBLD CKD-EPI 2021: >90 ML/MIN/1.73M*2
EOSINOPHIL # BLD AUTO: 0.14 X10*3/UL (ref 0–0.7)
EOSINOPHIL NFR BLD AUTO: 1.2 %
ERYTHROCYTE [DISTWIDTH] IN BLOOD BY AUTOMATED COUNT: 12.3 % (ref 11.5–14.5)
GLUCOSE SERPL-MCNC: 86 MG/DL (ref 74–99)
HCT VFR BLD AUTO: 41.8 % (ref 36–46)
HGB BLD-MCNC: 13.3 G/DL (ref 12–16)
IMM GRANULOCYTES # BLD AUTO: 0.08 X10*3/UL (ref 0–0.7)
IMM GRANULOCYTES NFR BLD AUTO: 0.7 % (ref 0–0.9)
LYMPHOCYTES # BLD AUTO: 2.7 X10*3/UL (ref 1.2–4.8)
LYMPHOCYTES NFR BLD AUTO: 23.4 %
MCH RBC QN AUTO: 29.7 PG (ref 26–34)
MCHC RBC AUTO-ENTMCNC: 31.8 G/DL (ref 32–36)
MCV RBC AUTO: 93 FL (ref 80–100)
MONOCYTES # BLD AUTO: 0.82 X10*3/UL (ref 0.1–1)
MONOCYTES NFR BLD AUTO: 7.1 %
NEUTROPHILS # BLD AUTO: 7.74 X10*3/UL (ref 1.2–7.7)
NEUTROPHILS NFR BLD AUTO: 67.1 %
NRBC BLD-RTO: 0 /100 WBCS (ref 0–0)
PLATELET # BLD AUTO: 273 X10*3/UL (ref 150–450)
POTASSIUM SERPL-SCNC: 3.9 MMOL/L (ref 3.5–5.3)
PROT SERPL-MCNC: 6.8 G/DL (ref 6.4–8.2)
RBC # BLD AUTO: 4.48 X10*6/UL (ref 4–5.2)
SODIUM SERPL-SCNC: 139 MMOL/L (ref 136–145)
WBC # BLD AUTO: 11.5 X10*3/UL (ref 4.4–11.3)

## 2024-08-22 PROCEDURE — 85025 COMPLETE CBC W/AUTO DIFF WBC: CPT

## 2024-08-22 PROCEDURE — 80053 COMPREHEN METABOLIC PANEL: CPT

## 2024-08-23 LAB
EST. AVERAGE GLUCOSE BLD GHB EST-MCNC: 117 MG/DL
HBA1C MFR BLD: 5.7 %

## 2024-08-23 NOTE — RESULT ENCOUNTER NOTE
Complete blood cell count continues to show an elevated white blood cell count as well as a slightly elevated neutrophil count    This is trending down and most likely still due to her reaction that she just had in regards to the bee sting    Sugar, kidneys, liver, electrolytes are within normal limits    We are just waiting for the hemoglobin A1c at at this time

## 2024-08-27 ENCOUNTER — APPOINTMENT (OUTPATIENT)
Dept: NEUROLOGY | Facility: CLINIC | Age: 39
End: 2024-08-27
Payer: COMMERCIAL

## 2024-08-27 VITALS
SYSTOLIC BLOOD PRESSURE: 118 MMHG | HEIGHT: 66 IN | DIASTOLIC BLOOD PRESSURE: 66 MMHG | HEART RATE: 76 BPM | TEMPERATURE: 96.3 F | BODY MASS INDEX: 23.95 KG/M2 | WEIGHT: 149 LBS

## 2024-08-27 DIAGNOSIS — G43.709 CHRONIC MIGRAINE WITHOUT AURA WITHOUT STATUS MIGRAINOSUS, NOT INTRACTABLE: Primary | ICD-10-CM

## 2024-08-27 PROCEDURE — 99215 OFFICE O/P EST HI 40 MIN: CPT | Performed by: STUDENT IN AN ORGANIZED HEALTH CARE EDUCATION/TRAINING PROGRAM

## 2024-08-27 PROCEDURE — 3008F BODY MASS INDEX DOCD: CPT | Performed by: STUDENT IN AN ORGANIZED HEALTH CARE EDUCATION/TRAINING PROGRAM

## 2024-08-27 ASSESSMENT — PAIN SCALES - GENERAL: PAINLEVEL: 6

## 2024-08-27 NOTE — PROGRESS NOTES
Date of Service: 8/27/2024  Patient: Rosibel Staton  MRN: [id number]  Referring Provider: Halima Garg PA-C    Chief Complaint: Headache    Rosibel Staton is a 39 y.o. year old female who presents with chief complaint of headaches. her past medical history is notable for cervical radiculitis, chronic migraine, anxiety, JEREMIAH, fatigue, depression, endometriosis, HLD, MDD, nicotine use, marijuana use, PTSD, syncope, vit D def.    HPI    She has a history of chronic migraines and was previously seen by Dr. Brito and Halima Garg.  At her last visit with Halima Garg she was started on Qulipta, but this caused nausea and vomiting along with poor appetite. She subsequently stopped it. Rizatriptan also made her feel very sluggish, as did sumatriptan. She was prescribed Nurtec every other day for prevention. This too caused nausea and vomiting.    She is frequently missing work because of the headaches. 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 She wears sunglasses frequently.    Migraine history:  Location: occipital or frontal, bilateral  Frequency: 1-2 per week   Severity: 9/10 at first, 6/10 average  Aura: no   Associated sx: (+) n/v, blurry vision, photosensitivity, phonosensitivity, dizziness, double vision when its very bad, no loss of vision  Aggravating/alleviating factors: sunglasses, ice, heat, stretching help, sleep does not. Too much caffeine.   Triggers: Stress; Not brought on by coughing, sneezing, bearing down, lying flat, intense exercise, sex, lights  Last eye check: two months ago   Family history of migraines: yes, aunt, grandma, mom   History of Kidney stones? yes  History of glaucoma? no  History of heart problems or arrhytmias? no  History of asthma? no  History of DM? no    Current Acute Headache Treatment Nurtec (Rimegepant)    Current Preventative Headache Treatment Propranolol    Previous Acute Headache Treatment Rizatriptan  Sumatriptan - fatigue   Previous Preventative Headache Treatment  Atogepant (Qulipta) - Nausea/vomiting  Nortriptyline - Sad feeling  Topiramate - suicidality  Amitriptyline - Sad feeling  Lamictal - mood swings     Headache Risk Factors and/or Co-morbidities:  Neck Pain: No  Back Pain: Yes  History of Motor Vehicle Accident: No  Sleep Disorder: No  Fibromyalgia: No  Obesity: No  History of Traumatic Brain Injury and/or Concussion: Yes, December    ROS: As per HPI, otherwise all other systems have been reviewed are negative for complaint.     Review of Systems    Past Medical History:   Diagnosis Date    Cough, unspecified 04/27/2018    Cough    Endometriosis 03/08/2023    Other cervical disc displacement, unspecified cervical region 10/03/2016    Cervical disc herniation    Personal history of other diseases of the circulatory system     History of hypertension    Personal history of other diseases of the digestive system     History of esophageal reflux    Personal history of other diseases of the musculoskeletal system and connective tissue 05/04/2016    History of herniated intervertebral disc    Personal history of other diseases of the respiratory system 01/06/2017    History of deviated nasal septum    Personal history of other diseases of urinary system     History of bladder problems    Personal history of other infectious and parasitic diseases 03/05/2020    History of candidiasis of mouth    Personal history of other mental and behavioral disorders     History of depression    Personal history of urinary (tract) infections 02/24/2021    History of urinary tract infection    Snoring     Snoring     Past Surgical History:   Procedure Laterality Date    OTHER SURGICAL HISTORY  10/24/2016    Drainage Of Pelvic Abscess    OTHER SURGICAL HISTORY  05/25/2022    Oophorectomy bilateral    OTHER SURGICAL HISTORY  07/16/2019    Laparoscopic hysterectomy    OTHER SURGICAL HISTORY  01/06/2017    Abdominal Surgery for ORS (ovarian remnant syndrome) following hysterectomy     Family  History   Problem Relation Name Age of Onset    Breast cancer Mother      Stroke Father      Diabetes Father      Breast cancer Maternal Grandmother       Social History     Tobacco Use    Smoking status: Former     Current packs/day: 0.00     Types: Cigarettes     Quit date: 3/1/2024     Years since quittin.4    Smokeless tobacco: Never    Tobacco comments:     Has undergone hypnosis for smoking cessation; remains tobacco free since 23.   Substance Use Topics    Alcohol use: Yes     Comment: occ      Objective   There were no vitals taken for this visit.    General Physical Exam:    Wearing sunglasses.  Appears in pain.     Neurological Exam:   Mental status reveals her to be alert and oriented. Speech is intact to conversation.     Cranial nerves:  CN 2   Visual fields full to confrontation.   CN 3, 4, 6   Pupils round, 3 mm in diameter, equally reactive to light. Lids symmetric; no ptosis. EOMs normal alignment, full range.   No nystagmus.   CN 5   Facial sensation intact bilaterally.   CN 7   Normal and symmetric facial strength. Nasolabial folds symmetric.   CN 8   Hearing intact to conversation.   CN 9   Palate elevates symmetrically.   CN 11   Normal strength of shoulder shrug and neck turning.   CN 12   Tongue midline, with normal bulk and strength; no fasciculations.      Motor:    R L   5 5 Shoulder abduction  5 5 Elbow flexion  5 5 Elbow extension  5 5 Finger abduction  5 5 Hip flexion  5 5 Knee flexion  5 5 Knee extension  5 5 Ankle dorsiflexion  5 5 Ankle plantarflexion     Reflexes                         R     L  Tricpes          2      2  Biceps           2      2  Brachiorad    2      2  Patellar         2      2   Achilles         2      2     Sensory:   Light Touch: normal     Coordination:  In both upper extremities, finger-nose-finger was intact without dysmetria or overshoot.   In both lower extremities, heel-to-shin was intact.    Gait:  Station was stable with a normal base. Gait  was stable with a normal arm swing and speed.    Results  MRI brain 6/7/2018    IMPRESSION:  Unremarkable MRI of brain.    Assessment/Plan     She has chronic migraines without aura. Has tried qulipta, Nortiptyline, Topiramate, Amitriptyline,  and Lamictal for prevention Has tried sumatriptan and rizatriptan for rescue. Unable to tolerate Qulipta due to nausea and vomiting.    Will start Aimovig injections every 28 days.  If tolerating I encouraged her to trial at least 3 months before determining that it is ineffective.      She also expressed an interest in Botox for migraine prevention.  I have placed a referral to a headache specialist that does Botox.    Letter written to reiterate work limitations given her severe migraines.    She will follow-up in 3 months.    Reviewed and approved by DIMPLE CAMERON on 8/27/24 at 7:06 AM.    I personally spent 40 minutes on the day of the visit completing the review of the medical record and outside records, obtaining history and performing an appropriate physical exam, patient care, counseling and education, placing orders, independently reviewing results, communicating with the patient, coordinating care and performing appropriate clinical documentation.

## 2024-08-27 NOTE — LETTER
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    If you have any questions or concerns, please don't hesitate to call.      Sincerely,        Hema Kulkarni MD    CC: No Recipients

## 2024-08-30 PROCEDURE — RXMED WILLOW AMBULATORY MEDICATION CHARGE

## 2024-09-04 PROBLEM — Z01.419 WELL WOMAN EXAM WITH ROUTINE GYNECOLOGICAL EXAM: Status: ACTIVE | Noted: 2024-09-04

## 2024-09-04 NOTE — PROGRESS NOTES
Subjective   Patient ID: Rosibel Staton is a 39 y.o. female who presents for Annual Exam (Fighting BV since June- still has odor).  She is s/p TLH LSO and subsequent RSO in 2019. 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   Vaginal estrogen is successful in managing vaginal atrophy.  10/4/2023 fast breast MRI returned negative. No recent mammogram is found on review of EMR.     She now is suffering from migraine headaches after head injury in December 2023.     She notes a fishy vaginal odor as with past BV. We discussed trying vaginal boric acid to pH balance.             Review of Systems   Constitutional:  Negative for activity change.   HENT:  Negative for congestion.    Respiratory:  Negative for apnea and cough.    Cardiovascular:  Negative for chest pain.   Gastrointestinal:  Negative for constipation and diarrhea.   Genitourinary:  Negative for hematuria and vaginal pain.   Musculoskeletal:  Negative for joint swelling.   Neurological:  Negative for dizziness.   Psychiatric/Behavioral:  Negative for agitation.        Past Medical History:   Diagnosis Date    Cough, unspecified 04/27/2018    Cough    Endometriosis 03/08/2023    Other cervical disc displacement, unspecified cervical region 10/03/2016    Cervical disc herniation    Personal history of other diseases of the circulatory system     History of hypertension    Personal history of other diseases of the digestive system     History of esophageal reflux    Personal history of other diseases of the musculoskeletal system and connective tissue 05/04/2016    History of herniated intervertebral disc    Personal history of other diseases of the respiratory system 01/06/2017    History of deviated nasal septum    Personal history of other diseases of urinary system     History of bladder problems    Personal history of other infectious and parasitic diseases 03/05/2020    History of candidiasis of mouth    Personal history of  other mental and behavioral disorders     History of depression    Personal history of urinary (tract) infections 02/24/2021    History of urinary tract infection    Snoring     Snoring      Past Surgical History:   Procedure Laterality Date    OTHER SURGICAL HISTORY  10/24/2016    Drainage Of Pelvic Abscess    OTHER SURGICAL HISTORY  05/25/2022    Oophorectomy bilateral    OTHER SURGICAL HISTORY  07/16/2019    Laparoscopic hysterectomy    OTHER SURGICAL HISTORY  01/06/2017    Abdominal Surgery for ORS (ovarian remnant syndrome) following hysterectomy    THROAT SURGERY        Allergies   Allergen Reactions    Nortriptyline Psychosis    Amoxicillin-Pot Clavulanate Unknown    Azithromycin Unknown    Bacitracin Zinc-Polymyxin B Unknown    Ciprofloxacin Unknown    Citalopram Unknown    Duloxetine Unknown    Gabapentin Unknown    Gadolinium-Containing Contrast Media Unknown    Lithium Unknown    Metaxalone Unknown    Nickel Unknown    Nitrofurantoin Monohyd/M-Cryst Unknown    Nurtec Odt [Rimegepant] Nausea/vomiting    Prednisone Unknown    Quetiapine Unknown    Qulipta [Atogepant] Drowsiness and Nausea/vomiting    Scopolamine Unknown    Sertraline Unknown    Sulfamethoxazole-Trimethoprim Unknown    Tramadol Unknown      Current Outpatient Medications on File Prior to Visit   Medication Sig Dispense Refill    ALPRAZolam (Xanax) 0.5 mg tablet Take 1 tablet (0.5 mg) by mouth 1 time if needed for sleep or anxiety for up to 2 doses. (Patient taking differently: Take 1 tablet (0.5 mg) by mouth 1 time if needed for sleep or anxiety. PRN) 2 tablet 0    ARIPiprazole (Abilify) 15 mg tablet Take 1 tablet (15 mg) by mouth once daily. 90 tablet 3    atorvastatin (Lipitor) 40 mg tablet Take 1 tablet (40 mg) by mouth once daily at bedtime. 90 tablet 3    clonazePAM (KlonoPIN) 1 mg tablet Take 1 tablet (1 mg) by mouth 3 times a day as needed for anxiety. 90 tablet 2    hydrOXYzine pamoate (Vistaril) 50 mg capsule Take 1 capsule (50 mg)  by mouth every 6 hours if needed for anxiety. 90 capsule 11    melatonin 10 mg tablet Take 2 tablets (20 mg) by mouth once daily at bedtime.      metaxalone (Skelaxin) 800 mg tablet Take 1 tablet (800 mg) by mouth 3 times a day as needed.      ondansetron (Zofran) 4 mg tablet Take 2 tablets (8 mg) by mouth every 8 hours if needed for nausea or vomiting. 10 tablet 0    ondansetron (Zofran) 8 mg tablet Take 1 tablet (8 mg) by mouth every 12 hours if needed for nausea. 20 tablet 11    propranolol (Inderal) 10 mg tablet Take 1 tablet (10 mg) by mouth 3 times a day. 90 tablet 11    [DISCONTINUED] estradiol (Vagifem) 10 mcg tablet vaginal tablet Insert 1 tablet (10 mcg) into the vagina 2 times a week. Insert one tablet into the vagina daily for 14 days then continue using twice weekly 34 tablet 3    erenumab (Aimovig) 70 mg/mL injection Inject 1 mL (70 mg) under the skin every 28 (twenty-eight) days. 1 mL 11     No current facility-administered medications on file prior to visit.        Objective   Physical Exam  Constitutional:       Appearance: Normal appearance.   Neck:      Thyroid: No thyromegaly.   Cardiovascular:      Rate and Rhythm: Normal rate and regular rhythm.      Heart sounds: Normal heart sounds.   Pulmonary:      Effort: Pulmonary effort is normal.      Breath sounds: Normal breath sounds.   Chest:      Chest wall: No mass.   Breasts:     Right: Normal. No inverted nipple, mass, nipple discharge or skin change.      Left: Normal. No inverted nipple, mass, nipple discharge or skin change.   Abdominal:      General: There is no distension.      Palpations: Abdomen is soft. There is no mass.      Tenderness: There is no abdominal tenderness.   Genitourinary:     General: Normal vulva.      Exam position: Lithotomy position.      Labia:         Right: No rash.         Left: No rash.       Urethra: No urethral lesion.      Vagina: Normal. No lesions.      Uterus: Absent.       Adnexa: Right adnexa normal and  left adnexa normal.        Right: No mass or tenderness.          Left: No mass or tenderness.        Comments: Anus and perineum are without lesion.  Bladder is without mass and non-tender.  Musculoskeletal:         General: No deformity.      Cervical back: Neck supple.   Lymphadenopathy:      Cervical: No cervical adenopathy.   Skin:     General: Skin is warm and dry.      Findings: No rash.   Neurological:      General: No focal deficit present.      Mental Status: She is alert.   Psychiatric:         Mood and Affect: Mood normal.         Behavior: Behavior is cooperative.         Thought Content: Thought content normal.           Problem List Items Addressed This Visit       Well woman exam with routine gynecological exam    Overview     She is s/p H LSO and subsequent RSO in 2019. 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   Vaginal estrogen is successful in managing vaginal atrophy.  10/4/2023 fast breast MRI returned negative. She refuses mammogram due to passing out.         Current Assessment & Plan     Pap is not indicated.  Mammogram is declined and MRI is ordered.  Regular exercise and attaining/maintaining a healthy weight is encouraged.   Adequate calcium intake with diet or supplements is encouraged.    We will notify of any abnormal results.          Bacterial vaginosis    Overview     Vaginal discharge with odor. Metronidazole prescribed.   She will try vaginal boric acid to balance pH.         Relevant Medications    metroNIDAZOLE (Flagyl) 500 mg tablet    Atrophic vaginitis - Primary    Overview     She is surgically menopausal. Vaginal atrophy is treated with estradiol vaginal tablets twice weekly.         Relevant Medications    estradiol (Vagifem) 10 mcg tablet vaginal tablet (Start on 9/12/2024)     Other Visit Diagnoses       Breast cancer screening by mammogram        Relevant Orders    MR breast bilateral w IV contrast fast screening self pay

## 2024-09-05 ENCOUNTER — SPECIALTY PHARMACY (OUTPATIENT)
Dept: PHARMACY | Facility: CLINIC | Age: 39
End: 2024-09-05

## 2024-09-06 ENCOUNTER — SPECIALTY PHARMACY (OUTPATIENT)
Dept: PHARMACY | Facility: CLINIC | Age: 39
End: 2024-09-06

## 2024-09-11 ENCOUNTER — PHARMACY VISIT (OUTPATIENT)
Dept: PHARMACY | Facility: CLINIC | Age: 39
End: 2024-09-11
Payer: COMMERCIAL

## 2024-09-11 ENCOUNTER — APPOINTMENT (OUTPATIENT)
Dept: OBSTETRICS AND GYNECOLOGY | Facility: CLINIC | Age: 39
End: 2024-09-11
Payer: COMMERCIAL

## 2024-09-11 VITALS
DIASTOLIC BLOOD PRESSURE: 80 MMHG | BODY MASS INDEX: 23.3 KG/M2 | WEIGHT: 145 LBS | HEIGHT: 66 IN | SYSTOLIC BLOOD PRESSURE: 110 MMHG

## 2024-09-11 DIAGNOSIS — B96.89 BACTERIAL VAGINOSIS: ICD-10-CM

## 2024-09-11 DIAGNOSIS — N95.2 ATROPHIC VAGINITIS: Primary | ICD-10-CM

## 2024-09-11 DIAGNOSIS — N76.0 BACTERIAL VAGINOSIS: ICD-10-CM

## 2024-09-11 DIAGNOSIS — Z12.31 BREAST CANCER SCREENING BY MAMMOGRAM: ICD-10-CM

## 2024-09-11 DIAGNOSIS — Z01.419 WELL WOMAN EXAM WITH ROUTINE GYNECOLOGICAL EXAM: ICD-10-CM

## 2024-09-11 PROCEDURE — 99396 PREV VISIT EST AGE 40-64: CPT | Performed by: OBSTETRICS & GYNECOLOGY

## 2024-09-11 PROCEDURE — 3008F BODY MASS INDEX DOCD: CPT | Performed by: OBSTETRICS & GYNECOLOGY

## 2024-09-11 RX ORDER — METRONIDAZOLE 500 MG/1
500 TABLET ORAL 2 TIMES DAILY
Qty: 14 TABLET | Refills: 0 | Status: SHIPPED | OUTPATIENT
Start: 2024-09-11 | End: 2024-09-18

## 2024-09-11 RX ORDER — ESTRADIOL 10 UG/1
10 INSERT VAGINAL 2 TIMES WEEKLY
Qty: 34 TABLET | Refills: 3 | Status: SHIPPED | OUTPATIENT
Start: 2024-09-12 | End: 2025-09-12

## 2024-09-11 ASSESSMENT — ENCOUNTER SYMPTOMS
HEMATURIA: 0
DIZZINESS: 0
CONSTIPATION: 0
AGITATION: 0
COUGH: 0
ACTIVITY CHANGE: 0
APNEA: 0
DIARRHEA: 0
JOINT SWELLING: 0

## 2024-09-12 ENCOUNTER — TELEMEDICINE (OUTPATIENT)
Dept: PRIMARY CARE | Facility: CLINIC | Age: 39
End: 2024-09-12
Payer: COMMERCIAL

## 2024-09-12 DIAGNOSIS — R50.9 FEVER AND CHILLS: ICD-10-CM

## 2024-09-12 DIAGNOSIS — R11.0 NAUSEA IN ADULT: Primary | ICD-10-CM

## 2024-09-12 DIAGNOSIS — R11.2 NAUSEA AND VOMITING, UNSPECIFIED VOMITING TYPE: ICD-10-CM

## 2024-09-12 PROCEDURE — 87635 SARS-COV-2 COVID-19 AMP PRB: CPT

## 2024-09-12 PROCEDURE — 99214 OFFICE O/P EST MOD 30 MIN: CPT | Performed by: STUDENT IN AN ORGANIZED HEALTH CARE EDUCATION/TRAINING PROGRAM

## 2024-09-12 ASSESSMENT — ENCOUNTER SYMPTOMS
COUGH: 0
ABDOMINAL PAIN: 1
CHEST TIGHTNESS: 0
DIAPHORESIS: 1
VOMITING: 1
NAUSEA: 1
SHORTNESS OF BREATH: 0
CHILLS: 1
SINUS PRESSURE: 0
FATIGUE: 1
SORE THROAT: 0
RHINORRHEA: 0

## 2024-09-12 NOTE — PROGRESS NOTES
Subjective   Patient ID: Rosibel Staton is a 39 y.o. female who presents for Covid-19 Home Monitoring Video Visit.    HPI   Possible COVID Infection / Nausea / vomiting   The patient took her child to the hospital the previous week and believes she came in contact with COVID there.    Symptoms appeared yesterday.    She states that she has been feeling nauseous with frequent vomiting and experiencing chills but no recorded fever.    She denies any cough, shortness of breath or chest pain.    She says that she feels her heart racing.  Denies SOB, difficulty breathing or any other issues    Review of Systems   Constitutional:  Positive for chills, diaphoresis and fatigue.   HENT:  Negative for congestion, rhinorrhea, sinus pressure and sore throat.    Respiratory:  Negative for cough, chest tightness and shortness of breath.    Cardiovascular:  Negative for chest pain.   Gastrointestinal:  Positive for abdominal pain, nausea and vomiting.       Objective   There were no vitals taken for this visit.    Physical Exam  CONSTITUTIONAL - well nourished, well developed, looks like stated age, in no acute distress, appears ill and fatigued  SKIN - normal skin color and pigmentation, normal skin turgor without rash, lesions, or nodules visualized  HEAD - no trauma, normocephalic  EYES - normal external exam  CHEST -no distressed breathing, good effort  EXTREMITIES - no edema, no deformities  NEUROLOGICAL - normal balance, normal motor, no ataxia  PSYCHIATRIC - alert, pleasant and cordial, age-appropriate    Assessment/Plan   Possible COVID Infection  Rosibel will come to the clinic this afternoon for a curbside COVID Test.  Paxlovid was mentioned due her symptoms and history of smoking, but the patient wants to see how the results of the COVID Test are before she decides what she wants to do.  She was advised to increase her fluids and maintain adequate electrolyte intake.  If symptoms are maintained or progress any  further, she was advised to call the office or go to the emergency room for further care.

## 2024-09-13 LAB — SARS-COV-2 RNA RESP QL NAA+PROBE: NOT DETECTED

## 2024-09-13 NOTE — RESULT ENCOUNTER NOTE
COVID-19 is negative     I encouraged supportive care such as rest, fluids and Advil/Tylenol as warranted  Return to the clinic in 7-10 days or sooner if symptoms worsen or persist as we will then further evaluate    At that time we will discuss antibiotic therapy if needed

## 2024-09-18 ENCOUNTER — APPOINTMENT (OUTPATIENT)
Dept: BEHAVIORAL HEALTH | Facility: CLINIC | Age: 39
End: 2024-09-18
Payer: MEDICARE

## 2024-09-18 DIAGNOSIS — F41.1 GENERALIZED ANXIETY DISORDER: ICD-10-CM

## 2024-09-18 DIAGNOSIS — F33.1 MODERATE EPISODE OF RECURRENT MAJOR DEPRESSIVE DISORDER: ICD-10-CM

## 2024-09-18 DIAGNOSIS — F43.10 PTSD (POST-TRAUMATIC STRESS DISORDER): ICD-10-CM

## 2024-09-18 PROCEDURE — 99214 OFFICE O/P EST MOD 30 MIN: CPT | Performed by: STUDENT IN AN ORGANIZED HEALTH CARE EDUCATION/TRAINING PROGRAM

## 2024-09-18 RX ORDER — ARIPIPRAZOLE 15 MG/1
15 TABLET ORAL DAILY
Qty: 90 TABLET | Refills: 3 | Status: SHIPPED | OUTPATIENT
Start: 2024-09-18 | End: 2025-09-18

## 2024-09-18 RX ORDER — CLONAZEPAM 1 MG/1
1 TABLET ORAL 3 TIMES DAILY PRN
Qty: 90 TABLET | Refills: 2 | Status: SHIPPED | OUTPATIENT
Start: 2024-09-18 | End: 2024-12-17

## 2024-09-18 RX ORDER — HYDROXYZINE PAMOATE 50 MG/1
50 CAPSULE ORAL EVERY 6 HOURS PRN
Qty: 90 CAPSULE | Refills: 11 | Status: SHIPPED | OUTPATIENT
Start: 2024-09-18 | End: 2025-09-18

## 2024-09-18 RX ORDER — ALPRAZOLAM 0.5 MG/1
0.5 TABLET ORAL ONCE AS NEEDED
Qty: 2 TABLET | Refills: 0 | Status: SHIPPED | OUTPATIENT
Start: 2024-09-18

## 2024-09-18 RX ORDER — PROPRANOLOL HYDROCHLORIDE 10 MG/1
10 TABLET ORAL 3 TIMES DAILY
Qty: 90 TABLET | Refills: 11 | Status: SHIPPED | OUTPATIENT
Start: 2024-09-18 | End: 2025-09-18
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
## 2024-09-24 NOTE — PROGRESS NOTES
Date of Service: 9/25/2024  Patient: Rosibel Staton  MRN: [id number]  Referring Provider: No ref. provider found    Virtual Consent    An interactive audio and video telecommunication system which permits real time communications between the patient (at the originating site) and provider (at the distant site) was utilized to provide this telehealth service.   Verbal consent was requested and obtained from Rosibel Staton on this date, 09/24/24 for a telehealth visit.     Chief Complaint: Migraines    Rosibel Staton is a 39 y.o. year old female who presents with chief complaint of headaches.  She presents today to discuss concerns about Aimovig injections.  Her past medical history is notable for cervical radiculitis, chronic migraine, anxiety, JEREMIAH, fatigue, depression, endometriosis, HLD, MDD, nicotine use, marijuana use, PTSD, syncope, vit D def.    HPI    At the last visit we discussed starting Aimovig injections.  She has yet to start the medication because she has concerns about potential side effects including injection site reaction and constipation.  Migraines remain unchanged.  She has an upcoming appointment with Dr. Arias to discuss Botox as well.    To recap:  She has a history of chronic migraines and was previously seen by Dr. Brito and Halima Garg.  At her last visit with Halima Garg she was started on Qulipta, but this caused nausea and vomiting along with poor appetite. She subsequently stopped it. Rizatriptan also made her feel very sluggish, as did sumatriptan. She was prescribed Nurtec every other day for prevention. This too caused nausea and vomiting.    She is frequently missing work because of the headaches. 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 She wears sunglasses frequently.    Migraine history:  Location: occipital or frontal, bilateral  Frequency: 1-2 per week   Severity: 9/10 at first, 6/10 average  Aura: no   Associated sx: (+) n/v, blurry vision, photosensitivity,  phonosensitivity, dizziness, double vision when its very bad, no loss of vision  Aggravating/alleviating factors: sunglasses, ice, heat, stretching help, sleep does not. Too much caffeine.   Triggers: Stress; Not brought on by coughing, sneezing, bearing down, lying flat, intense exercise, sex, lights  Last eye check: two months ago   Family history of migraines: yes, aunt, grandma, mom   History of Kidney stones? yes  History of glaucoma? no  History of heart problems or arrhytmias? no  History of asthma? no  History of DM? no    Current Acute Headache Treatment Nurtec (Rimegepant)    Current Preventative Headache Treatment Propranolol    Previous Acute Headache Treatment Rizatriptan  Sumatriptan - fatigue   Previous Preventative Headache Treatment Atogepant (Qulipta) - Nausea/vomiting  Nortriptyline - Sad feeling  Topiramate - suicidality  Amitriptyline - Sad feeling  Lamictal - mood swings     Headache Risk Factors and/or Co-morbidities:  Neck Pain: No  Back Pain: Yes  History of Motor Vehicle Accident: No  Sleep Disorder: No  Fibromyalgia: No  Obesity: No  History of Traumatic Brain Injury and/or Concussion: Yes, December    ROS: As per HPI, otherwise all other systems have been reviewed are negative for complaint.     Review of Systems    Past Medical History:   Diagnosis Date    Cough, unspecified 04/27/2018    Cough    Endometriosis 03/08/2023    Other cervical disc displacement, unspecified cervical region 10/03/2016    Cervical disc herniation    Personal history of other diseases of the circulatory system     History of hypertension    Personal history of other diseases of the digestive system     History of esophageal reflux    Personal history of other diseases of the musculoskeletal system and connective tissue 05/04/2016    History of herniated intervertebral disc    Personal history of other diseases of the respiratory system 01/06/2017    History of deviated nasal septum    Personal history of other  diseases of urinary system     History of bladder problems    Personal history of other infectious and parasitic diseases 2020    History of candidiasis of mouth    Personal history of other mental and behavioral disorders     History of depression    Personal history of urinary (tract) infections 2021    History of urinary tract infection    Snoring     Snoring     Past Surgical History:   Procedure Laterality Date    OTHER SURGICAL HISTORY  10/24/2016    Drainage Of Pelvic Abscess    OTHER SURGICAL HISTORY  2022    Oophorectomy bilateral    OTHER SURGICAL HISTORY  2019    Laparoscopic hysterectomy    OTHER SURGICAL HISTORY  2017    Abdominal Surgery for ORS (ovarian remnant syndrome) following hysterectomy    THROAT SURGERY       Family History   Problem Relation Name Age of Onset    Breast cancer Mother      Stroke Father      Diabetes Father      Breast cancer Maternal Grandmother       Social History     Tobacco Use    Smoking status: Former     Current packs/day: 0.00     Types: Cigarettes     Quit date: 3/1/2024     Years since quittin.5    Smokeless tobacco: Never    Tobacco comments:     Has undergone hypnosis for smoking cessation; remains tobacco free since 23.   Substance Use Topics    Alcohol use: Not Currently     Comment: occ      Objective   There were no vitals taken for this visit.    Results  MRI brain 2018    IMPRESSION:  Unremarkable MRI of brain.    Assessment/Plan     She has chronic migraines without aura. Has tried qulipta, Nortiptyline, Topiramate, Amitriptyline,  and Lamictal for prevention Has tried sumatriptan and rizatriptan for rescue. Unable to tolerate Qulipta due to nausea and vomiting.    At the last visit we discussed starting Aimovig; however she had concerns about potential side effects including constipation and injection site reaction.  I reviewed the clinical data with her showing that about 6% of patients had constipation with the  majority being mild.  1% had an injection site reaction.  Overall only 1.3% of clinical trial participant stopped the medication due to side effects.  She has an upcoming appointment with Dr. Arias to discuss Botox for migraine prevention.  She plans to hold off on starting Aimovig until further evaluation for Botox.    She can follow-up with me or Dr. Arias depending on if Botox is started.    Reviewed and approved by DIMPLE CAMERON on 9/24/24 at 2:51 PM.    I personally spent 10 minutes on the day of the visit completing the review of the medical record and outside records, obtaining history and performing an appropriate physical exam, patient care, counseling and education, placing orders, independently reviewing results, communicating with the patient, coordinating care and performing appropriate clinical documentation.

## 2024-09-25 ENCOUNTER — SPECIALTY PHARMACY (OUTPATIENT)
Dept: PHARMACY | Facility: CLINIC | Age: 39
End: 2024-09-25

## 2024-09-25 ENCOUNTER — APPOINTMENT (OUTPATIENT)
Dept: NEUROLOGY | Facility: CLINIC | Age: 39
End: 2024-09-25
Payer: COMMERCIAL

## 2024-09-25 DIAGNOSIS — G43.009 MIGRAINE WITHOUT AURA AND WITHOUT STATUS MIGRAINOSUS, NOT INTRACTABLE: Primary | ICD-10-CM

## 2024-09-25 PROCEDURE — 99212 OFFICE O/P EST SF 10 MIN: CPT | Performed by: STUDENT IN AN ORGANIZED HEALTH CARE EDUCATION/TRAINING PROGRAM

## 2024-10-01 ENCOUNTER — TELEPHONE (OUTPATIENT)
Dept: OTHER | Age: 39
End: 2024-10-01
Payer: COMMERCIAL

## 2024-10-01 DIAGNOSIS — F41.1 GENERALIZED ANXIETY DISORDER: ICD-10-CM

## 2024-10-01 DIAGNOSIS — F33.1 MODERATE EPISODE OF RECURRENT MAJOR DEPRESSIVE DISORDER: ICD-10-CM

## 2024-10-01 DIAGNOSIS — F43.10 PTSD (POST-TRAUMATIC STRESS DISORDER): ICD-10-CM

## 2024-10-01 RX ORDER — CLONAZEPAM 1 MG/1
1 TABLET ORAL 3 TIMES DAILY PRN
Qty: 90 TABLET | Refills: 2 | Status: SHIPPED | OUTPATIENT
Start: 2024-10-01 | End: 2024-12-30

## 2024-10-01 RX ORDER — CLONAZEPAM 1 MG/1
1 TABLET ORAL 3 TIMES DAILY PRN
Qty: 90 TABLET | Refills: 2 | Status: CANCELLED | OUTPATIENT
Start: 2024-10-01 | End: 2024-12-30

## 2024-10-01 RX ORDER — ALPRAZOLAM 0.5 MG/1
0.5 TABLET ORAL ONCE AS NEEDED
Qty: 2 TABLET | Refills: 0 | Status: CANCELLED | OUTPATIENT
Start: 2024-10-01

## 2024-10-01 RX ORDER — ARIPIPRAZOLE 15 MG/1
15 TABLET ORAL DAILY
Qty: 90 TABLET | Refills: 3 | Status: CANCELLED | OUTPATIENT
Start: 2024-10-01 | End: 2025-10-01

## 2024-10-01 RX ORDER — ALPRAZOLAM 0.5 MG/1
0.5 TABLET ORAL ONCE AS NEEDED
Qty: 2 TABLET | Refills: 0 | Status: SHIPPED | OUTPATIENT
Start: 2024-10-01

## 2024-10-01 RX ORDER — PROPRANOLOL HYDROCHLORIDE 10 MG/1
10 TABLET ORAL 3 TIMES DAILY
Qty: 90 TABLET | Refills: 11 | Status: CANCELLED | OUTPATIENT
Start: 2024-10-01 | End: 2025-10-01

## 2024-10-01 RX ORDER — HYDROXYZINE PAMOATE 50 MG/1
50 CAPSULE ORAL EVERY 6 HOURS PRN
Qty: 90 CAPSULE | Refills: 11 | Status: CANCELLED | OUTPATIENT
Start: 2024-10-01 | End: 2025-10-01

## 2024-10-01 RX ORDER — ARIPIPRAZOLE 15 MG/1
15 TABLET ORAL DAILY
Qty: 90 TABLET | Refills: 3 | Status: SHIPPED | OUTPATIENT
Start: 2024-10-01 | End: 2025-10-01

## 2024-10-01 RX ORDER — HYDROXYZINE PAMOATE 50 MG/1
50 CAPSULE ORAL EVERY 6 HOURS PRN
Qty: 90 CAPSULE | Refills: 11 | Status: SHIPPED | OUTPATIENT
Start: 2024-10-01 | End: 2025-10-01

## 2024-10-01 RX ORDER — PROPRANOLOL HYDROCHLORIDE 10 MG/1
10 TABLET ORAL 3 TIMES DAILY
Qty: 90 TABLET | Refills: 11 | Status: SHIPPED | OUTPATIENT
Start: 2024-10-01 | End: 2025-10-01

## 2024-10-01 NOTE — TELEPHONE ENCOUNTER
ALPRAZolam (Xanax) 0.5 mg tablet   ARIPiprazole (Abilify) 15 mg tablet   atorvastatin (Lipitor) 40 mg tablet   clonazePAM (KlonoPIN) 1 mg tablet   hydrOXYzine pamoate (Vistaril) 50 mg capsule

## 2024-10-03 ENCOUNTER — OFFICE VISIT (OUTPATIENT)
Dept: NEUROLOGY | Facility: CLINIC | Age: 39
End: 2024-10-03
Payer: MEDICARE

## 2024-10-03 VITALS — HEIGHT: 66 IN | BODY MASS INDEX: 22.5 KG/M2 | RESPIRATION RATE: 16 BRPM | WEIGHT: 140 LBS

## 2024-10-03 DIAGNOSIS — G43.709 CHRONIC MIGRAINE WITHOUT AURA WITHOUT STATUS MIGRAINOSUS, NOT INTRACTABLE: Primary | ICD-10-CM

## 2024-10-03 DIAGNOSIS — F07.81 POST CONCUSSION SYNDROME: ICD-10-CM

## 2024-10-03 PROCEDURE — 3008F BODY MASS INDEX DOCD: CPT | Performed by: STUDENT IN AN ORGANIZED HEALTH CARE EDUCATION/TRAINING PROGRAM

## 2024-10-03 PROCEDURE — 99215 OFFICE O/P EST HI 40 MIN: CPT | Performed by: STUDENT IN AN ORGANIZED HEALTH CARE EDUCATION/TRAINING PROGRAM

## 2024-10-03 ASSESSMENT — ENCOUNTER SYMPTOMS
DEPRESSION: 1
OCCASIONAL FEELINGS OF UNSTEADINESS: 0
LOSS OF SENSATION IN FEET: 0

## 2024-10-03 ASSESSMENT — PATIENT HEALTH QUESTIONNAIRE - PHQ9
6. FEELING BAD ABOUT YOURSELF - OR THAT YOU ARE A FAILURE OR HAVE LET YOURSELF OR YOUR FAMILY DOWN: NEARLY EVERY DAY
SUM OF ALL RESPONSES TO PHQ9 QUESTIONS 1 AND 2: 6
1. LITTLE INTEREST OR PLEASURE IN DOING THINGS: NEARLY EVERY DAY
10. IF YOU CHECKED OFF ANY PROBLEMS, HOW DIFFICULT HAVE THESE PROBLEMS MADE IT FOR YOU TO DO YOUR WORK, TAKE CARE OF THINGS AT HOME, OR GET ALONG WITH OTHER PEOPLE: EXTREMELY DIFFICULT
8. MOVING OR SPEAKING SO SLOWLY THAT OTHER PEOPLE COULD HAVE NOTICED. OR THE OPPOSITE, BEING SO FIGETY OR RESTLESS THAT YOU HAVE BEEN MOVING AROUND A LOT MORE THAN USUAL: NOT AT ALL
SUM OF ALL RESPONSES TO PHQ QUESTIONS 1-9: 19
4. FEELING TIRED OR HAVING LITTLE ENERGY: NEARLY EVERY DAY
3. TROUBLE FALLING OR STAYING ASLEEP OR SLEEPING TOO MUCH: NEARLY EVERY DAY
9. THOUGHTS THAT YOU WOULD BE BETTER OFF DEAD, OR OF HURTING YOURSELF: NOT AT ALL
7. TROUBLE CONCENTRATING ON THINGS, SUCH AS READING THE NEWSPAPER OR WATCHING TELEVISION: NEARLY EVERY DAY
5. POOR APPETITE OR OVEREATING: SEVERAL DAYS
2. FEELING DOWN, DEPRESSED OR HOPELESS: NEARLY EVERY DAY

## 2024-10-03 NOTE — PROGRESS NOTES
Subjective     Chief Complaint: Headache    Rosibel Staton is a 39 y.o. year old female who presents with chief complaint of headaches.    Rosibel started getting headaches in single digit age. Was getting headaches a few times a year.  Headaches gradually worsening in frequency and severity during teenage years. Had a total hysterectomy in 2020, and headaches were close to resolved, with only 2/30 HA days per month. Was started on estrogen in 11/2023. Was having dryness, itchiness, hot flashes, and irritability which led to starting the estrogen.     In 12/2023, had 50 pounds of meat fall on her head at work. Since that time, has had an increase in the number of headaches. No LOC. Was dazed and confused for a few days. Headache developed immediately afterwards. No crystal clear headache free time since onset.     Currently having 30/30 HA days per month. The headaches are usually throbbing and are located temples, with radiation to occipital and frontal areas, generally symmetric. The patient rates her most severe headaches a 9 in intensity. Generally, headaches last about 16-48 hours in duration at peak severity. Associated nausea, photophobia, phonophobia, and vomiting. Headaches are worsened with exertion. Triggers include glare, loud sounds, flashing lights.    Had crystal clear headache free days with qulipta and nurtec, but did not tolerate.     Has some memory impairment and difficulty sleeping since concussion.    No clear aura.     Current Acute Headache Treatment Ibuprofen  Tylenol  (Rarely used)   Current Preventative Headache Treatment Propranolol    Previous Acute Headache Treatment Naratriptan  Sumatriptan  (Relieves but does not resolve)   Previous Preventative Headache Treatment Amitriptyline  (SE: SI)  Topiramate (SE: SI)  VPA  Qulipta (SE: nauseated and loss of appetite)  Nurtec (SE: severe nausea)       ROS: As per HPI, otherwise all other systems have been reviewed are negative for complaint.      Past Medical History:   Diagnosis Date    Cough, unspecified 2018    Cough    Endometriosis 2023    Other cervical disc displacement, unspecified cervical region 10/03/2016    Cervical disc herniation    Personal history of other diseases of the circulatory system     History of hypertension    Personal history of other diseases of the digestive system     History of esophageal reflux    Personal history of other diseases of the musculoskeletal system and connective tissue 2016    History of herniated intervertebral disc    Personal history of other diseases of the respiratory system 2017    History of deviated nasal septum    Personal history of other diseases of urinary system     History of bladder problems    Personal history of other infectious and parasitic diseases 2020    History of candidiasis of mouth    Personal history of other mental and behavioral disorders     History of depression    Personal history of urinary (tract) infections 2021    History of urinary tract infection    Snoring     Snoring     Past Surgical History:   Procedure Laterality Date    OTHER SURGICAL HISTORY  10/24/2016    Drainage Of Pelvic Abscess    OTHER SURGICAL HISTORY  2022    Oophorectomy bilateral    OTHER SURGICAL HISTORY  2019    Laparoscopic hysterectomy    OTHER SURGICAL HISTORY  2017    Abdominal Surgery for ORS (ovarian remnant syndrome) following hysterectomy    THROAT SURGERY       Family History   Problem Relation Name Age of Onset    Breast cancer Mother      Migraines Mother      Stroke Father      Diabetes Father      Breast cancer Maternal Grandmother      Migraines Maternal Grandmother       Social History     Tobacco Use    Smoking status: Former     Current packs/day: 0.00     Types: Cigarettes     Quit date: 3/1/2024     Years since quittin.5    Smokeless tobacco: Never    Tobacco comments:     Has undergone hypnosis for smoking cessation; remains  "tobacco free since 04/25/24.   Substance Use Topics    Alcohol use: Not Currently     Comment: occ        Objective   Resp 16   Ht 1.676 m (5' 6\")   Wt 63.5 kg (140 lb)   BMI 22.60 kg/m²     Neuro Exam:  Cardiac Exam: No apparent edema of b/l lower extremities  Neurological Exam:  MENTAL STATUS:   General Appearance: No distress, alert, interactive, and cooperative. Orientation was normal to time, place and person. Recent and remote memory was intact.     OPHTHALMOSCOPIC:   The ophthalmoscopic exam was normal. The fundi were well visualized with normal disc margins, clear vessels and vascular pulsations. No disc edema. The cup/disk ratio was not enlarged. No hemorrhages or exudates were present in the posterior segments that were visualized.     CRANIAL NERVES:   CN 2         Visual fields full to confrontation.   CN 3, 4, 6   Pupils round, 4 mm in diameter, equally reactive to light. Lids symmetric; no ptosis. EOMs normal alignment, full range with normal saccades, pursuit and convergence.   No nystagmus.   CN 5   Facial sensation intact bilaterally.   CN 7   Normal and symmetric facial strength. Nasolabial folds symmetric.   CN 8   Hearing intact to conversation and finger rub.  CN 9, 10   Palate elevates symmetrically.  CN 11   Normal strength of shoulder shrug and neck turning.   CN 12   Tongue midline, with normal bulk and strength; no fasciculations.     MOTOR:   Muscle bulk and tone were normal in both upper and lower extremities.   No pronator drift bilaterally.  No fasciculations, tremor or other abnormal movements evident with the patient examined clothed.    STRENGTH:  R  L  Deltoid            5          5  Biceps  5 5  Triceps  5 5    Hip flexion 5 5  Knee Flex 5 5  Knee Ex 5 5    REFLEXES: R L  Biceps  2 2                     Triceps  2 2  Patellar  2 2     SENSORY:   In both upper and lower extremities, sensation was intact to light touch.    COORDINATION:   In both upper extremities, " finger-nose-finger was intact without dysmetria or overshoot.     GAIT:   Station was stable with a normal base. Gait was stable with a normal arm swing and speed. No ataxia, shuffling, steppage or waddling was present. No circumduction was present. No Romberg sign was present.    Assessment/Plan   Given the description and frequency of headaches, patient likely has chronic migraine without aura. Per our discussion, patient is more open to trying Aimovig at this time. Would restart Naratriptan for breakthrough headache control. Advised patient that I would not be recommending any days off from work, but that reasonable accommodations could be made. She would like to stay with current neurologist, and take these recommendations to him. If she would like to do Botox downstream, she would return to this clinic.      Will refer to neuropsych for assistance with control of post concussive syndrome.     Recommendations:  - start Aimovig 140mg monthly  - restart Naratrtipan 2.5 PRN  - referral to neuropsych  - follow up PRN    I personally spent 53 minutes today, exclusive of procedures, providing care for this patient, including preparation, face to face time, documentation and other services such as review of medical records, diagnostic result, patient education, counseling, coordination of care as specified in the encounter.

## 2024-10-07 ENCOUNTER — SPECIALTY PHARMACY (OUTPATIENT)
Dept: PHARMACY | Facility: CLINIC | Age: 39
End: 2024-10-07

## 2024-10-07 DIAGNOSIS — G43.709 CHRONIC MIGRAINE WITHOUT AURA WITHOUT STATUS MIGRAINOSUS, NOT INTRACTABLE: ICD-10-CM

## 2024-10-07 RX ORDER — ERENUMAB-AOOE 70 MG/ML
70 INJECTION SUBCUTANEOUS
Qty: 1 ML | Refills: 11 | Status: SHIPPED | OUTPATIENT
Start: 2024-10-07

## 2024-10-09 ENCOUNTER — SPECIALTY PHARMACY (OUTPATIENT)
Dept: PHARMACY | Facility: CLINIC | Age: 39
End: 2024-10-09

## 2024-10-14 ENCOUNTER — TELEPHONE (OUTPATIENT)
Dept: OTHER | Age: 39
End: 2024-10-14
Payer: COMMERCIAL

## 2024-10-14 RX ORDER — ALPRAZOLAM 0.5 MG/1
TABLET ORAL
Qty: 2 TABLET | Refills: 0 | OUTPATIENT
Start: 2024-10-14

## 2024-10-14 NOTE — TELEPHONE ENCOUNTER
Patient call to request fax number to send over [facility] paperwork to request a leave of absence from her job due to an increase in panick attacks. Gave th patient the 12th fl fax [phone]

## 2024-10-17 ENCOUNTER — TELEMEDICINE (OUTPATIENT)
Dept: PRIMARY CARE | Facility: CLINIC | Age: 39
End: 2024-10-17
Payer: COMMERCIAL

## 2024-10-17 DIAGNOSIS — B35.4 TINEA CORPORIS: Primary | ICD-10-CM

## 2024-10-17 PROCEDURE — 99214 OFFICE O/P EST MOD 30 MIN: CPT | Performed by: STUDENT IN AN ORGANIZED HEALTH CARE EDUCATION/TRAINING PROGRAM

## 2024-10-17 RX ORDER — TERBINAFINE HYDROCHLORIDE 250 MG/1
250 TABLET ORAL DAILY
Qty: 14 TABLET | Refills: 0 | Status: SHIPPED | OUTPATIENT
Start: 2024-10-17 | End: 2024-10-17

## 2024-10-17 RX ORDER — TERBINAFINE HYDROCHLORIDE 250 MG/1
250 TABLET ORAL DAILY
Qty: 14 TABLET | Refills: 0 | Status: SHIPPED | OUTPATIENT
Start: 2024-10-17 | End: 2024-10-31

## 2024-10-17 NOTE — PROGRESS NOTES
Subjective   Patient ID: Rosibel Staton is a 39 y.o. female who presents for genital irritation    HPI   Patient is calling into the office today via a telemedicine virtual visit to discuss signs/symptoms of tinea/ringworm around her genitals    Unfortunately she stated that her  was recently diagnosed with a severe case of ringworm around his penile area and she even mentioned that they did have intercourse just prior to his symptoms    [facility] who told him to be treated with oral medication as well as recommended that his significant other, the patient, be treated as well    Currently she is completely asymptomatic and denies any vaginal discharge, pain, lesions, irritation, or any other acute signs/symptoms    Due to her close contact, she is now asking be further evaluate/treated    Review of Systems  All pertinent positive symptoms are included in the history of present illness.    All other systems have been reviewed and are negative and noncontributory to this patient's current ailments.    Objective   There were no vitals taken for this visit.    Physical Exam  CONSTITUTIONAL - well nourished, well developed, looks like stated age, in no acute distress, not ill-appearing, and not tired appearing  SKIN - normal skin color and pigmentation, normal skin turgor without rash, lesions, or nodules visualized  HEAD - no trauma, normocephalic  EYES - normal external exam  CHEST -no distressed breathing, good effort  EXTREMITIES - no edema, no deformities  NEUROLOGICAL - normal balance, normal motor, no ataxia  PSYCHIATRIC - alert, pleasant and cordial, age-appropriate    Assessment/Plan   We had a long conversation about all of your signs/symptoms as well as I did research during our examination as well as calling you back in 10-15 minutes    Ultimately the research was saying that there is a novel irritation/infection that can be spread via sexual contact that could be fungal  in nature    Due to this, I would like to be aggressive and treat you with oral terbinafine 250 mg for at least the next 7-14 days    This was sent to your local pharmacy    I ultimately recommend and strongly encourage that you follow-up with your OB/GYN to have an examination performed to make sure there is nothing that I am missing within the vaginal vault/walls    At any point if you notice worsening or acute signs/symptoms you need to notify me immediately and/or go to the nearest emergency room to be acutely evaluated

## 2024-10-19 NOTE — PROGRESS NOTES
"Subjective   Patient ID: Rosibel Staton is a 39 y.o. female who presents for vaginal odor ( has been dx with ringworm on genitalia, patient treated by pcp ).  She presents for gyn visit. Annual exam was performed on 9/11/2024.  She is s/p TLH LSO and subsequent RSO in 2019. 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   Vaginal estrogen is successful in managing vaginal atrophy.    She presents for evaluation after learning her  was diagnosed with ringworm on his penis. This is causing rash and irritation on the penis and scrotum. [facility] and is still awaiting his [facility]. She reached out to her PCP when learning of the ringworm. Her PCP contacted her after doing some reading, and decided to prescribe antifungal medication to help prevent \"rare novel irritation/infection that can be spread via sexual contact that could be fungal in nature.\" She was prescribed a 14 day course of oral terbinafine 250 mg.    She remains without symptoms, and denies any itching, discharge, rash, irritation or odor. She has a history of bacterial vaginosis in the past but also denies concern for this currently. She has not been sexually active with her  since starting her treatment, and she plans to hold off until they are both treated.           Review of Systems   Constitutional:  Negative for activity change.   HENT:  Negative for congestion.    Respiratory:  Negative for apnea and cough.    Cardiovascular:  Negative for chest pain.   Gastrointestinal:  Negative for constipation and diarrhea.   Genitourinary:  Negative for hematuria and vaginal pain.   Musculoskeletal:  Negative for joint swelling.   Neurological:  Negative for dizziness.   Psychiatric/Behavioral:  Negative for agitation.        Past Medical History:   Diagnosis Date    Cough, unspecified 04/27/2018    Cough    Endometriosis 03/08/2023    Other cervical disc displacement, unspecified cervical " region 10/03/2016    Cervical disc herniation    Personal history of other diseases of the circulatory system     History of hypertension    Personal history of other diseases of the digestive system     History of esophageal reflux    Personal history of other diseases of the musculoskeletal system and connective tissue 05/04/2016    History of herniated intervertebral disc    Personal history of other diseases of the respiratory system 01/06/2017    History of deviated nasal septum    Personal history of other diseases of urinary system     History of bladder problems    Personal history of other infectious and parasitic diseases 03/05/2020    History of candidiasis of mouth    Personal history of other mental and behavioral disorders     History of depression    Personal history of urinary (tract) infections 02/24/2021    History of urinary tract infection    Snoring     Snoring      Past Surgical History:   Procedure Laterality Date    OTHER SURGICAL HISTORY  10/24/2016    Drainage Of Pelvic Abscess    OTHER SURGICAL HISTORY  05/25/2022    Oophorectomy bilateral    OTHER SURGICAL HISTORY  07/16/2019    Laparoscopic hysterectomy    OTHER SURGICAL HISTORY  01/06/2017    Abdominal Surgery for ORS (ovarian remnant syndrome) following hysterectomy    THROAT SURGERY        Allergies   Allergen Reactions    Nortriptyline Psychosis    Topamax [Topiramate] Psychosis     Per pt suicidal    Amoxicillin-Pot Clavulanate Unknown    Azithromycin Unknown    Bacitracin Zinc-Polymyxin B Unknown    Ciprofloxacin Unknown    Citalopram Unknown    Duloxetine Unknown    Gabapentin Unknown    Gadolinium-Containing Contrast Media Unknown    Lithium Unknown    Metaxalone Unknown    Nickel Unknown    Nitrofurantoin Monohyd/M-Cryst Unknown    Nurtec Odt [Rimegepant] Nausea/vomiting    Prednisone Unknown    Quetiapine Unknown    Qulipta [Atogepant] Drowsiness and Nausea/vomiting    Scopolamine Unknown    Sertraline Unknown     Sulfamethoxazole-Trimethoprim Unknown    Tramadol Unknown      Current Outpatient Medications on File Prior to Visit   Medication Sig Dispense Refill    ALPRAZolam (Xanax) 0.5 mg tablet Take 1 tablet (0.5 mg) by mouth 1 time if needed for anxiety (Pre-procedure anxiety only) for up to 2 doses. 2 tablet 0    ARIPiprazole (Abilify) 15 mg tablet Take 1 tablet (15 mg) by mouth once daily. 90 tablet 3    atorvastatin (Lipitor) 40 mg tablet Take 1 tablet (40 mg) by mouth once daily at bedtime. 90 tablet 3    clonazePAM (KlonoPIN) 1 mg tablet Take 1 tablet (1 mg) by mouth 3 times a day as needed for anxiety. 90 tablet 2    erenumab (Aimovig Autoinjector) 70 mg/mL injection Inject 1 mL (70 mg) under the skin every 28 (twenty-eight) days. 1 mL 11    estradiol (Vagifem) 10 mcg tablet vaginal tablet Insert 1 tablet (10 mcg) into the vagina 2 times a week. Insert one tablet into the vagina daily for 14 days then continue using twice weekly 34 tablet 3    hydrOXYzine pamoate (Vistaril) 50 mg capsule Take 1 capsule (50 mg) by mouth every 6 hours if needed for anxiety. 90 capsule 11    melatonin 10 mg tablet Take 2 tablets (20 mg) by mouth once daily at bedtime.      metaxalone (Skelaxin) 800 mg tablet Take 1 tablet (800 mg) by mouth 3 times a day as needed.      ondansetron (Zofran) 8 mg tablet Take 1 tablet (8 mg) by mouth every 12 hours if needed for nausea. 20 tablet 11    propranolol (Inderal) 10 mg tablet Take 1 tablet (10 mg) by mouth 3 times a day. 90 tablet 11    terbinafine (LamISIL) 250 mg tablet Take 1 tablet (250 mg) by mouth once daily for 14 days. 14 tablet 0    [DISCONTINUED] terbinafine (LamISIL) 250 mg tablet Take 1 tablet (250 mg) by mouth once daily for 14 days. 14 tablet 0     No current facility-administered medications on file prior to visit.        Objective   Physical Exam  Constitutional:       Appearance: Normal appearance.   Cardiovascular:      Rate and Rhythm: Normal rate and regular rhythm.    Pulmonary:      Effort: Pulmonary effort is normal.      Breath sounds: Normal breath sounds.   Abdominal:      Palpations: Abdomen is soft. There is no mass.      Tenderness: There is no abdominal tenderness.      Hernia: No hernia is present.   Genitourinary:     General: Normal vulva.      Exam position: Lithotomy position.      Labia:         Right: No lesion.         Left: No lesion.       Urethra: No urethral lesion.      Vagina: Normal. No lesions or prolapsed vaginal walls.      Adnexa: Right adnexa normal and left adnexa normal.        Right: No mass or tenderness.          Left: No mass or tenderness.        Comments: Vaginal cuff is intact. Normal appearing discharge is present and no rash or inflammation is noted.   Skin:     General: Skin is warm and dry.   Neurological:      Mental Status: She is alert.           Problem List Items Addressed This Visit       Exposure to nonspecific genital infection - Primary    Overview      was diagnosed with ringworm. She denies any current symptom and exam is not concerning for vaginal infection.          Current Assessment & Plan     Vaginal gram stain is sent to assess for fungal infection. She will plan to finish course of antifungal prescribed by her PCP. She will reach out with any future concern for infection.

## 2024-10-22 ENCOUNTER — APPOINTMENT (OUTPATIENT)
Dept: OBSTETRICS AND GYNECOLOGY | Facility: CLINIC | Age: 39
End: 2024-10-22
Payer: MEDICARE

## 2024-10-22 VITALS — WEIGHT: 145 LBS | SYSTOLIC BLOOD PRESSURE: 102 MMHG | DIASTOLIC BLOOD PRESSURE: 72 MMHG | BODY MASS INDEX: 23.4 KG/M2

## 2024-10-22 DIAGNOSIS — Z20.9: Primary | ICD-10-CM

## 2024-10-22 PROCEDURE — 99213 OFFICE O/P EST LOW 20 MIN: CPT | Performed by: OBSTETRICS & GYNECOLOGY

## 2024-10-22 PROCEDURE — 87205 SMEAR GRAM STAIN: CPT

## 2024-10-22 ASSESSMENT — ENCOUNTER SYMPTOMS
HEMATURIA: 0
CONSTIPATION: 0
ACTIVITY CHANGE: 0
DIZZINESS: 0
AGITATION: 0
DIARRHEA: 0
COUGH: 0
APNEA: 0
JOINT SWELLING: 0

## 2024-10-23 LAB
BACTERIAL VAGINOSIS VAG-IMP: NORMAL
CLUE CELLS VAG LPF-#/AREA: NORMAL /[LPF]
NUGENT SCORE: 6
YEAST VAG WET PREP-#/AREA: NORMAL

## 2024-10-24 ENCOUNTER — TELEPHONE (OUTPATIENT)
Dept: OBSTETRICS AND GYNECOLOGY | Facility: CLINIC | Age: 39
End: 2024-10-24
Payer: COMMERCIAL

## 2024-10-24 NOTE — TELEPHONE ENCOUNTER
----- Message from Hanane Berry sent at 10/23/2024  3:56 PM EDT -----  Vaginal swab shows no sign of infection. The healthy bacterial count is lower than ideal. This may be related to taking the new medicine. Taking a probiotic or eating yogurt can help establish a good bacterial balance.

## 2024-11-09 ENCOUNTER — SPECIALTY PHARMACY (OUTPATIENT)
Dept: PHARMACY | Facility: CLINIC | Age: 39
End: 2024-11-09

## 2024-11-09 PROCEDURE — RXMED WILLOW AMBULATORY MEDICATION CHARGE

## 2024-11-12 ENCOUNTER — PHARMACY VISIT (OUTPATIENT)
Dept: PHARMACY | Facility: CLINIC | Age: 39
End: 2024-11-12
Payer: COMMERCIAL

## 2024-12-05 ENCOUNTER — SPECIALTY PHARMACY (OUTPATIENT)
Dept: PHARMACY | Facility: CLINIC | Age: 39
End: 2024-12-05

## 2024-12-05 ENCOUNTER — APPOINTMENT (OUTPATIENT)
Dept: NEUROLOGY | Facility: CLINIC | Age: 39
End: 2024-12-05
Payer: COMMERCIAL

## 2024-12-05 DIAGNOSIS — G43.709 CHRONIC MIGRAINE WITHOUT AURA WITHOUT STATUS MIGRAINOSUS, NOT INTRACTABLE: Primary | ICD-10-CM

## 2024-12-05 PROCEDURE — 99213 OFFICE O/P EST LOW 20 MIN: CPT | Performed by: STUDENT IN AN ORGANIZED HEALTH CARE EDUCATION/TRAINING PROGRAM

## 2024-12-05 PROCEDURE — G2211 COMPLEX E/M VISIT ADD ON: HCPCS | Performed by: STUDENT IN AN ORGANIZED HEALTH CARE EDUCATION/TRAINING PROGRAM

## 2024-12-05 RX ORDER — NARATRIPTAN 2.5 MG/1
2.5 TABLET ORAL ONCE AS NEEDED
Qty: 9 TABLET | Refills: 5 | Status: SHIPPED | OUTPATIENT
Start: 2024-12-05 | End: 2025-06-03

## 2024-12-05 NOTE — PROGRESS NOTES
Date of Service: 12/5/2024  Patient: Rosibel Staton  MRN: [id number]  Referring Provider: No ref. provider found    Virtual Consent    An interactive audio and video telecommunication system which permits real time communications between the patient (at the originating site) and provider (at the distant site) was utilized to provide this telehealth service.   Verbal consent was requested and obtained from Rosibel Staton on this date, 12/05/24 for a telehealth visit.     Chief Complaint: Post Concussive Migraines    Rosibel Staton is a 39 y.o. year old female who presents for follow up of post concussive headaches.  Her past medical history is notable for cervical radiculitis, chronic migraine, anxiety, JEREMIAH, fatigue, depression, endometriosis, HLD, MDD, nicotine use, marijuana use, PTSD, syncope, vit D def.    HPI    Since the last visit she started Aimovig injections. She had a mild site reaction with redness but otherwise no issues with the injection. Headache remain unchanged from before. She reports headaches are better on Saturday when she isn't working. She associated this with not being under bright fluorescent lights.  She had an appointment with Dr. Arias to discuss Botox as well and it was discussed that trialing aimovig would be a good next step before considering Botox.    To recap:  She has a history of chronic migraines and was previously seen by Dr. Brito and Halima Garg.  At her last visit with Halima Garg she was started on Qulipta, but this caused nausea and vomiting along with poor appetite. She subsequently stopped it. Rizatriptan also made her feel very sluggish, as did sumatriptan. She was prescribed Nurtec every other day for prevention. This too caused nausea and vomiting.    She frequently misses work because of the headaches. 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 She wears sunglasses frequently.    Migraine history:  Location: occipital or frontal, bilateral  Frequency:  Daily  Severity: 9/10 at first, 6/10 average  Aura: no   Associated sx: (+) n/v, blurry vision, photosensitivity, phonosensitivity, dizziness, double vision when its very bad, no loss of vision  Aggravating/alleviating factors: sunglasses, ice, heat, stretching help, sleep does not. Too much caffeine.   Triggers: Stress; Not brought on by coughing, sneezing, bearing down, lying flat, intense exercise, sex, lights  Family history of migraines: yes, aunt, grandma, mom   History of Kidney stones? yes  History of glaucoma? no  History of heart problems or arrhytmias? no  History of asthma? no  History of DM? no    Current Acute Headache Treatment None   Current Preventative Headache Treatment Propranolol   Aimovig   Previous Acute Headache Treatment Rizatriptan  Sumatriptan - fatigue   Previous Preventative Headache Treatment Atogepant (Qulipta) - Nausea/vomiting  Nortriptyline - Sad feeling  Topiramate - suicidality  Amitriptyline - Sad feeling  Lamictal - mood swings  Nurtec - ineffective     Headache Risk Factors and/or Co-morbidities:  Neck Pain: No  Back Pain: Yes  History of Motor Vehicle Accident: No  Sleep Disorder: No  Fibromyalgia: No  Obesity: No  History of Traumatic Brain Injury and/or Concussion: Yes, December    ROS: As per HPI, otherwise all other systems have been reviewed are negative for complaint.     Review of Systems    Past Medical History:   Diagnosis Date    Cough, unspecified 04/27/2018    Cough    Endometriosis 03/08/2023    Other cervical disc displacement, unspecified cervical region 10/03/2016    Cervical disc herniation    Personal history of other diseases of the circulatory system     History of hypertension    Personal history of other diseases of the digestive system     History of esophageal reflux    Personal history of other diseases of the musculoskeletal system and connective tissue 05/04/2016    History of herniated intervertebral disc    Personal history of other diseases of the  respiratory system 2017    History of deviated nasal septum    Personal history of other diseases of urinary system     History of bladder problems    Personal history of other infectious and parasitic diseases 2020    History of candidiasis of mouth    Personal history of other mental and behavioral disorders     History of depression    Personal history of urinary (tract) infections 2021    History of urinary tract infection    Snoring     Snoring     Past Surgical History:   Procedure Laterality Date    OTHER SURGICAL HISTORY  10/24/2016    Drainage Of Pelvic Abscess    OTHER SURGICAL HISTORY  2022    Oophorectomy bilateral    OTHER SURGICAL HISTORY  2019    Laparoscopic hysterectomy    OTHER SURGICAL HISTORY  2017    Abdominal Surgery for ORS (ovarian remnant syndrome) following hysterectomy    THROAT SURGERY       Family History   Problem Relation Name Age of Onset    Breast cancer Mother      Migraines Mother      Stroke Father      Diabetes Father      Breast cancer Maternal Grandmother      Migraines Maternal Grandmother       Social History     Tobacco Use    Smoking status: Former     Current packs/day: 0.00     Types: Cigarettes     Quit date: 3/1/2024     Years since quittin.7    Smokeless tobacco: Never    Tobacco comments:     Has undergone hypnosis for smoking cessation; remains tobacco free since 23.   Substance Use Topics    Alcohol use: Not Currently     Comment: occ      Objective   There were no vitals taken for this visit.    Results  MRI brain 2018    IMPRESSION:  Unremarkable MRI of brain.    Assessment/Plan     She has chronic post concussive headaches and reports daily headaches triggered by fluorescent lights. Has tried qulipta, Nortiptyline, Topiramate, Amitriptyline,  and Lamictal for prevention Has tried sumatriptan and rizatriptan for rescue. Unable to tolerate Qulipta and Nurtec due to nausea and vomiting.    She recently started  Aimovig last month and has not noticed any improvement in headache frequency. I encouraged her to continue for the next 2-3 months to see the full efficacy. We will also start naratriptan for rescue therapy.     She was referred to neuropsych by Dr. Arias for assistance with the post concussive syndrome and I encouraged her to make this appointment.    Reviewed and approved by DIMPLE CAMERON on 12/5/24 at 8:02 AM.    I personally spent 20 minutes on the day of the visit completing the review of the medical record and outside records, obtaining history and performing an appropriate physical exam, patient care, counseling and education, placing orders, independently reviewing results, communicating with the patient, coordinating care and performing appropriate clinical documentation.

## 2024-12-09 ENCOUNTER — APPOINTMENT (OUTPATIENT)
Dept: PRIMARY CARE | Facility: CLINIC | Age: 39
End: 2024-12-09
Payer: COMMERCIAL

## 2024-12-09 VITALS
WEIGHT: 142 LBS | SYSTOLIC BLOOD PRESSURE: 120 MMHG | DIASTOLIC BLOOD PRESSURE: 80 MMHG | BODY MASS INDEX: 22.92 KG/M2 | HEART RATE: 87 BPM | OXYGEN SATURATION: 97 %

## 2024-12-09 DIAGNOSIS — H61.892: Primary | ICD-10-CM

## 2024-12-09 DIAGNOSIS — M25.551 RIGHT HIP PAIN: ICD-10-CM

## 2024-12-09 PROCEDURE — 99214 OFFICE O/P EST MOD 30 MIN: CPT | Performed by: STUDENT IN AN ORGANIZED HEALTH CARE EDUCATION/TRAINING PROGRAM

## 2024-12-09 RX ORDER — FLUOCINOLONE ACETONIDE 0.11 MG/ML
5 OIL AURICULAR (OTIC) 2 TIMES DAILY
Qty: 20 ML | Refills: 0 | Status: SHIPPED | OUTPATIENT
Start: 2024-12-09

## 2024-12-09 ASSESSMENT — PATIENT HEALTH QUESTIONNAIRE - PHQ9
SUM OF ALL RESPONSES TO PHQ9 QUESTIONS 1 AND 2: 0
2. FEELING DOWN, DEPRESSED OR HOPELESS: NOT AT ALL
1. LITTLE INTEREST OR PLEASURE IN DOING THINGS: NOT AT ALL

## 2024-12-09 NOTE — PROGRESS NOTES
Subjective   Patient ID: Rosibel Staton is a 39 y.o. female who presents for Left Earache and Right Hip/Groin Pain.    HPI   Patient is presenting into the office today to discuss a few concerns as noted in the chart below    1.  Left ear pain  States that she has been feeling some inner ear pain that she is wondering if it is associated with her inner eardrum or something else  Feels like there is some fluid within it but also feels like something is stuck within her eardrum itself  States that there was some itching that she did try to manually do with her fingernail  Asking for advice/recommendations    2.  Right hip pain  Has been noticing increased signs/symptoms of right inner hip pain that is noticed with movement of her hip as well as sometimes walking  States that her father is getting a hip replacement and now wondering if this would be related or not or wondering if it could be her hip itself secondary to arthritis  Asking for advice/recommendations    Review of Systems  All pertinent positive symptoms are included in the history of present illness.    All other systems have been reviewed and are negative and noncontributory to this patient's current ailments.    Objective   /80 (BP Location: Left arm, Patient Position: Sitting, BP Cuff Size: Adult)   Pulse 87   Wt 64.4 kg (142 lb)   SpO2 97%   BMI 22.92 kg/m²     Physical Exam  CONSTITUTIONAL - well nourished, well developed, looks like stated age, in no acute distress, not ill-appearing, and not tired appearing  SKIN - normal skin color and pigmentation, normal skin turgor without rash, lesions, or nodules visualized  HEAD - no trauma, normocephalic  EYES - normal external exam  ENT -noted dry left ear canal with some slight redness/irritation due to a healing scab from trauma  CHEST -no distressed breathing, good effort  EXTREMITIES - no edema, no deformities  MSK -noted in her hip pain with internal and external rotation, range of motion  restricted due to pain  NEUROLOGICAL - normal balance, normal motor, no ataxia  PSYCHIATRIC - alert, pleasant and cordial, age-appropriate    Assessment/Plan   1.  Dry left ear canal  I truly believe that this could be due to eczema or something similar  I did prescribe Derm otic to use twice daily and if this continues to be an issue we will have you follow-up with otolaryngology    2.  Right hip pain  I did order x-rays to further evaluate for any osteoarthritis  I recommend home exercises at this time and pending your x-ray/imaging, we will discuss further treatment options and recommendations at that time

## 2024-12-16 ENCOUNTER — TELEPHONE (OUTPATIENT)
Dept: CARDIOLOGY | Facility: HOSPITAL | Age: 39
End: 2024-12-16

## 2024-12-16 ENCOUNTER — OFFICE VISIT (OUTPATIENT)
Dept: PRIMARY CARE | Facility: CLINIC | Age: 39
End: 2024-12-16
Payer: COMMERCIAL

## 2024-12-16 VITALS — OXYGEN SATURATION: 96 % | HEART RATE: 85 BPM | DIASTOLIC BLOOD PRESSURE: 80 MMHG | SYSTOLIC BLOOD PRESSURE: 104 MMHG

## 2024-12-16 DIAGNOSIS — B96.89 BACTERIAL VAGINOSIS: ICD-10-CM

## 2024-12-16 DIAGNOSIS — N76.0 BACTERIAL VAGINOSIS: ICD-10-CM

## 2024-12-16 DIAGNOSIS — R00.1 BRADYCARDIA: Primary | ICD-10-CM

## 2024-12-16 PROCEDURE — 99213 OFFICE O/P EST LOW 20 MIN: CPT | Performed by: STUDENT IN AN ORGANIZED HEALTH CARE EDUCATION/TRAINING PROGRAM

## 2024-12-16 NOTE — PROGRESS NOTES
Subjective   Patient ID: Rosibel Staton is a 39 y.o. female who presents for Hypotension and Vaginal Concerns.    HPI   Bradycardia  Patient presents today in the office to discuss about bradycardia and low blood pressure.  3 days ago while she was at her neurologist appointment she was found to have low blood pressure and decreased heart rate.  She was sent by neurologist to [facility] emergency room where she did a EKG showing sinus rhythm without ST segment deviation and a heart rate of 48.  Patient was encouraged to do a 6-minute walk and blood pressure and low heart rate stabilized within normal limits after that.  Routine lab work was unremarkable, with normal TSH level.  Patient does not exercise outside of being active at her daily work.  Denies any chest pain, chest tightness, lightheadedness  Has called her cardiologist office to schedule appointment  Also patient known propranolol 10 mg daily for her anxiety and migraine prevention  Patient has a history of hyperlipidemia and has self discontinued her Lipitor 40 mg daily  Willing to discuss this today to rule out any major complication    BV  Patient admits of having signs and symptoms of bacterial vaginosis, which has been worsening in the last few days.  Denies any recent use of antibiotics  Patient has a prescription of Flagyl for 7 days already at home, sent by her OB/GYN.  Denies any urinary symptoms    Review of Systems  All pertinent positive symptoms are included in the history of present illness.    All other systems have been reviewed and are negative and noncontributory to this patient's current ailments.    Objective   /80 (BP Location: Left arm, Patient Position: Sitting, BP Cuff Size: Adult)   Pulse 85   SpO2 96%     Physical Exam  Constitutional:       General: She is not in acute distress.     Appearance: Normal appearance. She is normal weight. She is not ill-appearing.   HENT:      Head: Normocephalic and atraumatic.      Right  Ear: External ear normal.      Left Ear: External ear normal.      Nose: Nose normal. No congestion or rhinorrhea.      Mouth/Throat:      Mouth: Mucous membranes are moist.      Pharynx: Oropharynx is clear. No oropharyngeal exudate or posterior oropharyngeal erythema.   Eyes:      General: No scleral icterus.     Extraocular Movements: Extraocular movements intact.      Conjunctiva/sclera: Conjunctivae normal.      Pupils: Pupils are equal, round, and reactive to light.   Cardiovascular:      Rate and Rhythm: Normal rate and regular rhythm.      Pulses: Normal pulses.      Heart sounds: Normal heart sounds. No murmur heard.  Pulmonary:      Effort: Pulmonary effort is normal. No respiratory distress.      Breath sounds: Normal breath sounds. No wheezing, rhonchi or rales.   Abdominal:      General: Abdomen is flat. Bowel sounds are normal.      Palpations: Abdomen is soft.      Tenderness: There is no abdominal tenderness. There is no guarding or rebound.   Genitourinary:     Comments: Deferred  Musculoskeletal:         General: No deformity. Normal range of motion.      Right lower leg: No edema.      Left lower leg: No edema.   Skin:     General: Skin is warm and dry.      Capillary Refill: Capillary refill takes less than 2 seconds.      Findings: No lesion or rash.   Neurological:      General: No focal deficit present.      Mental Status: She is alert and oriented to person, place, and time. Mental status is at baseline.   Psychiatric:         Mood and Affect: Mood normal.         Behavior: Behavior normal.         Assessment/Plan   Diagnoses and all orders for this visit:  Bradycardia  Patient has complaints of bradycardia, for which hypothyroidism is ruled out for the moment as possible etiology.  Instructed to stay hydrated and increase exercise.  Also we discussed and agreed to discontinue propranolol, for the moment.  Patient will follow-up with her cardiologist for further monitoring, likely with a  Holter and possible treatment  At today's visit heart rate is 85/min and blood pressure within normal limits at 104/80.  Patient is asymptomatic.  Will continue to monitor and instructed to go to the emergency department if new worrisome signs or symptoms arise    Bacterial vaginosis  Clinical presentation is more consistent with bacterial vaginosis.  Vaginal yeast infection is low in differential diagnosis  Instructed to use her metronidazole p.o. as prescribed by her OB/GYN  If symptoms do not improve in 7 to 10 days patient will call our office or OB/GYN office for further evaluation.  Considering screening for STD at that time    Thank you for letting us be a part of your care team.  Please call the office if you have further questions or concerns regarding your care    Liza Mclaughlin MD  PGY2, FM Resident

## 2024-12-19 ENCOUNTER — TELEMEDICINE (OUTPATIENT)
Dept: PRIMARY CARE | Facility: CLINIC | Age: 39
End: 2024-12-19
Payer: COMMERCIAL

## 2024-12-19 DIAGNOSIS — R09.81 SINUS CONGESTION: ICD-10-CM

## 2024-12-19 DIAGNOSIS — R11.2 NAUSEA AND VOMITING, UNSPECIFIED VOMITING TYPE: ICD-10-CM

## 2024-12-19 DIAGNOSIS — J06.9 ACUTE URI: ICD-10-CM

## 2024-12-19 DIAGNOSIS — R09.81 NASAL CONGESTION: Primary | ICD-10-CM

## 2024-12-19 LAB — RSV RNA RESP QL NAA+PROBE: NOT DETECTED

## 2024-12-19 PROCEDURE — 99213 OFFICE O/P EST LOW 20 MIN: CPT | Performed by: STUDENT IN AN ORGANIZED HEALTH CARE EDUCATION/TRAINING PROGRAM

## 2024-12-19 PROCEDURE — 87637 SARSCOV2&INF A&B&RSV AMP PRB: CPT

## 2024-12-19 NOTE — PROGRESS NOTES
Subjective   Patient ID: Rosibel Staton is a 39 y.o. female who presents for Vomiting.  Today she is accompanied by alone.     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 90 tablet 3    atorvastatin (Lipitor) 40 mg tablet Take 1 tablet (40 mg) by mouth once daily at bedtime. 90 tablet 3    clonazePAM (KlonoPIN) 1 mg tablet Take 1 tablet (1 mg) by mouth 3 times a day as needed for anxiety. 90 tablet 2    erenumab (Aimovig Autoinjector) 70 mg/mL injection Inject 1 mL (70 mg) under the skin every 28 (twenty-eight) days. 1 mL 11    estradiol (Vagifem) 10 mcg tablet vaginal tablet Insert 1 tablet (10 mcg) into the vagina 2 times a week. Insert one tablet into the vagina daily for 14 days then continue using twice weekly 34 tablet 3    fluocinolone (DermOtic) 0.01 % ear drops Administer 5 drops into the left ear 2 times a day. 20 mL 0    hydrOXYzine pamoate (Vistaril) 50 mg capsule Take 1 capsule (50 mg) by mouth every 6 hours if needed for anxiety. 90 capsule 11    melatonin 10 mg tablet Take 2 tablets (20 mg) by mouth once daily at bedtime. (Patient not taking: Reported on 12/5/2024)      metaxalone (Skelaxin) 800 mg tablet Take 1 tablet (800 mg) by mouth 3 times a day  as needed.      naratriptan (Amerge) 2.5 mg tablet Take 1 tablet (2.5 mg) by mouth 1 time if needed for migraine (may repeat x1). 9 tablet 5    ondansetron (Zofran) 8 mg tablet Take 1 tablet (8 mg) by mouth every 12 hours if needed for nausea. 20 tablet 11    propranolol (Inderal) 10 mg tablet Take 1 tablet (10 mg) by mouth 3 times a day. 90 tablet 11    [DISCONTINUED] ALPRAZolam (Xanax) 0.5 mg tablet Take 1 tablet (0.5 mg) by mouth 1 time if needed for anxiety (Pre-procedure anxiety only) for up to 2 doses. 2 tablet 0     No current facility-administered medications on file prior to visit.        Allergies   Allergen Reactions    Nortriptyline Psychosis    Topamax [Topiramate] Psychosis     Per pt suicidal    Amoxicillin-Pot Clavulanate Unknown    Azithromycin Unknown    Bacitracin Zinc-Polymyxin B Unknown    Ciprofloxacin Unknown    Citalopram Unknown    Duloxetine Unknown    Gabapentin Unknown    Gadolinium-Containing Contrast Media Unknown    Lithium Unknown    Metaxalone Unknown    Nickel Unknown    Nitrofurantoin Monohyd/M-Cryst Unknown    Nurtec Odt [Rimegepant] Nausea/vomiting    Prednisone Unknown    Quetiapine Unknown    Qulipta [Atogepant] Drowsiness and Nausea/vomiting    Scopolamine Unknown    Sertraline Unknown    Sulfamethoxazole-Trimethoprim Unknown    Tramadol Unknown    Adhesive Other    Cyclosporine Rash       Immunization History   Administered Date(s) Administered    Tdap vaccine, age 7 year and older (BOOSTRIX, ADACEL) 01/15/2014, 10/19/2015         Review of Systems  All pertinent positive symptoms are included in the history of present illness.  All other systems have been reviewed and are negative and noncontributory to this patient's current ailments.     Objective   There were no vitals taken for this visit.  BSA: There is no height or weight on file to calculate BSA.  No visits with results within 1 Month(s) from this visit.   Latest known visit with results is:   Office Visit on  "10/22/2024   Component Date Value Ref Range Status    Mundo Score 10/22/2024 6  0 - 3 Final    Interpretation of the Mundo Score  0-3.....Normal vaginal microbiota  4-6.....Intermediate results  7-10....Bacterial vaginosis    Yeast 10/22/2024 ABSENT  ABSENT Final    Clue Cells 10/22/2024 ABSENT  ABSENT Final    Vaginitis-BV +Yeast Interpretation 10/22/2024    Final    Lactobacilli are decreased, but significant abnormal microbiota are absent, which results in a Mundo score interpretation of \"intermediate\".         Physical Exam  CONSTITUTIONAL - well nourished, well developed, looks like stated age, in no acute distress, appears slightly fatigued and slightly tired  SKIN - normal skin color and pigmentation, normal skin turgor without rash, lesions, or nodules visualized  HEAD - no trauma, normocephalic  EYES - normal external exam  CHEST -no distressed breathing, good effort  EXTREMITIES - no edema, no deformities  NEUROLOGICAL - normal balance, normal motor, no ataxia  PSYCHIATRIC - alert, pleasant and cordial, age-appropriate    Assessment/Plan   Nasal Congestion/Vomiting   Please come to the office for a COVID, flu, and RSV swab   We will notify you of the results when they are available and make treatment recommendations accordingly   Ensure you are getting enough fluids through out the day and stick to a BRAT diet, which includes bananas, rice, apple sauce and toast  Wear a mask around your father to ensure you are not transmitting any germs     At any point if you notice worsening or acute signs/symptoms please notify me immediately and/or go to nearest emergency room to be acutely evaluated  "

## 2024-12-20 LAB
FLUAV RNA RESP QL NAA+PROBE: NOT DETECTED
FLUBV RNA RESP QL NAA+PROBE: NOT DETECTED
SARS-COV-2 ORF1AB RESP QL NAA+PROBE: NOT DETECTED

## 2025-01-09 ENCOUNTER — HOSPITAL ENCOUNTER (OUTPATIENT)
Dept: RADIOLOGY | Facility: CLINIC | Age: 40
Discharge: HOME | End: 2025-01-09
Payer: COMMERCIAL

## 2025-01-09 DIAGNOSIS — M25.551 RIGHT HIP PAIN: ICD-10-CM

## 2025-01-09 DIAGNOSIS — M25.562 ACUTE PAIN OF BOTH KNEES: ICD-10-CM

## 2025-01-09 DIAGNOSIS — M25.561 ACUTE PAIN OF BOTH KNEES: ICD-10-CM

## 2025-01-09 PROCEDURE — 73522 X-RAY EXAM HIPS BI 3-4 VIEWS: CPT

## 2025-01-09 PROCEDURE — 73562 X-RAY EXAM OF KNEE 3: CPT | Mod: 50

## 2025-01-23 ENCOUNTER — APPOINTMENT (OUTPATIENT)
Dept: OTOLARYNGOLOGY | Facility: CLINIC | Age: 40
End: 2025-01-23
Payer: COMMERCIAL

## 2025-01-27 DIAGNOSIS — G90.3 NEUROGENIC ORTHOSTATIC HYPOTENSION (MULTI): ICD-10-CM

## 2025-01-27 RX ORDER — ATORVASTATIN CALCIUM 40 MG/1
40 TABLET, FILM COATED ORAL NIGHTLY
Qty: 90 TABLET | Refills: 0 | Status: SHIPPED | OUTPATIENT
Start: 2025-01-27

## 2025-01-29 ENCOUNTER — APPOINTMENT (OUTPATIENT)
Dept: BEHAVIORAL HEALTH | Facility: CLINIC | Age: 40
End: 2025-01-29
Payer: MEDICARE

## 2025-01-29 ENCOUNTER — APPOINTMENT (OUTPATIENT)
Dept: PRIMARY CARE | Facility: CLINIC | Age: 40
End: 2025-01-29
Payer: COMMERCIAL

## 2025-01-29 DIAGNOSIS — F41.1 GENERALIZED ANXIETY DISORDER: ICD-10-CM

## 2025-01-29 DIAGNOSIS — F43.10 PTSD (POST-TRAUMATIC STRESS DISORDER): ICD-10-CM

## 2025-01-29 DIAGNOSIS — F33.1 MODERATE EPISODE OF RECURRENT MAJOR DEPRESSIVE DISORDER: ICD-10-CM

## 2025-01-29 PROCEDURE — 99214 OFFICE O/P EST MOD 30 MIN: CPT | Performed by: STUDENT IN AN ORGANIZED HEALTH CARE EDUCATION/TRAINING PROGRAM

## 2025-01-29 RX ORDER — ARIPIPRAZOLE 20 MG/1
20 TABLET ORAL DAILY
Qty: 90 TABLET | Refills: 3 | Status: SHIPPED | OUTPATIENT
Start: 2025-01-29 | End: 2026-01-29

## 2025-01-29 RX ORDER — CLONAZEPAM 1 MG/1
1 TABLET ORAL 3 TIMES DAILY PRN
Qty: 90 TABLET | Refills: 2 | Status: SHIPPED | OUTPATIENT
Start: 2025-01-29 | End: 2025-04-29
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
## 2025-02-03 ENCOUNTER — APPOINTMENT (OUTPATIENT)
Dept: OBSTETRICS AND GYNECOLOGY | Facility: CLINIC | Age: 40
End: 2025-02-03
Payer: COMMERCIAL

## 2025-02-04 ENCOUNTER — APPOINTMENT (OUTPATIENT)
Dept: OBSTETRICS AND GYNECOLOGY | Facility: CLINIC | Age: 40
End: 2025-02-04
Payer: COMMERCIAL

## 2025-02-04 VITALS
DIASTOLIC BLOOD PRESSURE: 90 MMHG | SYSTOLIC BLOOD PRESSURE: 138 MMHG | HEIGHT: 66 IN | BODY MASS INDEX: 21.86 KG/M2 | WEIGHT: 136 LBS

## 2025-02-04 DIAGNOSIS — N76.0 BACTERIAL VAGINOSIS: ICD-10-CM

## 2025-02-04 DIAGNOSIS — B96.89 BACTERIAL VAGINOSIS: ICD-10-CM

## 2025-02-04 DIAGNOSIS — Z11.3 SCREEN FOR STD (SEXUALLY TRANSMITTED DISEASE): ICD-10-CM

## 2025-02-04 PROCEDURE — 3008F BODY MASS INDEX DOCD: CPT | Performed by: NURSE PRACTITIONER

## 2025-02-04 PROCEDURE — 99214 OFFICE O/P EST MOD 30 MIN: CPT | Performed by: NURSE PRACTITIONER

## 2025-02-04 PROCEDURE — 1036F TOBACCO NON-USER: CPT | Performed by: NURSE PRACTITIONER

## 2025-02-04 RX ORDER — METRONIDAZOLE 7.5 MG/G
GEL VAGINAL NIGHTLY
Qty: 70 G | Refills: 0 | Status: SHIPPED | OUTPATIENT
Start: 2025-02-04 | End: 2025-02-09

## 2025-02-04 ASSESSMENT — ENCOUNTER SYMPTOMS
CONSTITUTIONAL NEGATIVE: 1
GASTROINTESTINAL NEGATIVE: 1
RESPIRATORY NEGATIVE: 1
PSYCHIATRIC NEGATIVE: 1
NEUROLOGICAL NEGATIVE: 1

## 2025-02-04 NOTE — PROGRESS NOTES
"Subjective   Patient ID: Rosibel Staton is a 39 y.o. female who presents for Infection (Patient complains of vaginal odor, vaginal itching and severe abdominal pain that started a few days ago, \" felt like period cramps \". Hysterectomy done 2019.).  39 year old here for complaints of having vaginal irritation, discharge, itching and cramping.  She has a history of BV infections off and on while she was with her ex partner.  She has not been with him since 2024.  She denies any bleeding, urinary changes.  She notes that all her symptoms started a few weeks ago and occurred for 2-3 days then went away and then came back for another 2-3 days.  Last week the symptoms returned with more intensity and have not  down like they normally do.  She desires std testing.         Review of Systems   Constitutional: Negative.    HENT: Negative.     Respiratory: Negative.     Gastrointestinal: Negative.    Genitourinary:  Positive for vaginal discharge.   Skin: Negative.    Neurological: Negative.    Psychiatric/Behavioral: Negative.         Objective   Physical Exam  Vitals reviewed.   Constitutional:       Appearance: Normal appearance. She is well-developed.   Pulmonary:      Effort: Pulmonary effort is normal. No respiratory distress.   Chest:   Breasts:     Breasts are symmetrical.      Right: Normal. No swelling, bleeding, inverted nipple, mass, nipple discharge, skin change or tenderness.      Left: Normal. No swelling, bleeding, inverted nipple, mass, nipple discharge, skin change or tenderness.   Abdominal:      Palpations: Abdomen is soft.   Genitourinary:     General: Normal vulva.      Exam position: Lithotomy position.      Pubic Area: No rash.       Labia:         Right: No rash, tenderness, lesion or injury.         Left: No rash, tenderness, lesion or injury.       Urethra: No prolapse, urethral pain, urethral swelling or urethral lesion.      Vagina: Vaginal discharge present.      Cervix: Normal. "      Uterus: Normal.       Adnexa: Right adnexa normal and left adnexa normal.      Rectum: Normal.   Musculoskeletal:         General: Normal range of motion.   Lymphadenopathy:      Upper Body:      Right upper body: No supraclavicular, axillary or pectoral adenopathy.      Left upper body: No supraclavicular, axillary or pectoral adenopathy.   Skin:     General: Skin is warm and dry.   Neurological:      General: No focal deficit present.      Mental Status: She is alert and oriented to person, place, and time. Mental status is at baseline.   Psychiatric:         Attention and Perception: Attention and perception normal.         Mood and Affect: Mood and affect normal.         Speech: Speech normal.         Behavior: Behavior normal. Behavior is cooperative.         Thought Content: Thought content normal.         Judgment: Judgment normal.         Assessment/Plan   Problem List Items Addressed This Visit             ICD-10-CM    Bacterial vaginosis N76.0, B96.89    Relevant Medications    metroNIDAZOLE (Metrogel) 0.75 % (37.5mg/5 gram) vaginal gel   Gram stain sent  Metronidazole ordered  Encouraged boric acid suppositories once a week  Gc/chlamydia/trich sent  Will treat further if needed pending results   Follow up as needed         CORINNE Navas-CNP 02/04/25 2:23 PM

## 2025-02-05 LAB
BV SCORE VAG QL: NORMAL
C TRACH RRNA SPEC QL NAA+PROBE: NOT DETECTED
N GONORRHOEA RRNA SPEC QL NAA+PROBE: NOT DETECTED
QUEST GC CT AMPLIFIED (ALWAYS MESSAGE): NORMAL
QUEST GC CT AMPLIFIED (ALWAYS MESSAGE): NORMAL
T VAGINALIS RRNA SPEC QL NAA+PROBE: NOT DETECTED

## 2025-02-06 ENCOUNTER — OFFICE VISIT (OUTPATIENT)
Dept: OBSTETRICS AND GYNECOLOGY | Facility: CLINIC | Age: 40
End: 2025-02-06
Payer: COMMERCIAL

## 2025-02-06 ENCOUNTER — APPOINTMENT (OUTPATIENT)
Dept: PRIMARY CARE | Facility: CLINIC | Age: 40
End: 2025-02-06
Payer: COMMERCIAL

## 2025-02-06 VITALS — BODY MASS INDEX: 22.11 KG/M2 | SYSTOLIC BLOOD PRESSURE: 90 MMHG | WEIGHT: 137 LBS | DIASTOLIC BLOOD PRESSURE: 60 MMHG

## 2025-02-06 DIAGNOSIS — N93.9 ABNORMAL VAGINAL BLEEDING: Primary | ICD-10-CM

## 2025-02-06 DIAGNOSIS — N95.2 ATROPHIC VAGINITIS: ICD-10-CM

## 2025-02-06 PROCEDURE — 99213 OFFICE O/P EST LOW 20 MIN: CPT | Performed by: OBSTETRICS & GYNECOLOGY

## 2025-02-06 PROCEDURE — 1036F TOBACCO NON-USER: CPT | Performed by: OBSTETRICS & GYNECOLOGY

## 2025-02-06 ASSESSMENT — ENCOUNTER SYMPTOMS
JOINT SWELLING: 0
CONSTIPATION: 0
DIARRHEA: 0
ACTIVITY CHANGE: 0
DIZZINESS: 0
APNEA: 0
AGITATION: 0
COUGH: 0
HEMATURIA: 0
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
## 2025-02-06 NOTE — ASSESSMENT & PLAN NOTE
She was encouraged to take probiotic and consider vaginal boric acid for intermittent odor. Follow up as needed.

## 2025-02-07 ENCOUNTER — APPOINTMENT (OUTPATIENT)
Dept: NEUROLOGY | Facility: CLINIC | Age: 40
End: 2025-02-07
Payer: COMMERCIAL

## 2025-02-17 ENCOUNTER — TELEMEDICINE (OUTPATIENT)
Dept: CARDIOLOGY | Facility: CLINIC | Age: 40
End: 2025-02-17
Payer: COMMERCIAL

## 2025-02-17 DIAGNOSIS — G90.3 NEUROGENIC ORTHOSTATIC HYPOTENSION (MULTI): ICD-10-CM

## 2025-02-17 PROCEDURE — 99212 OFFICE O/P EST SF 10 MIN: CPT | Performed by: INTERNAL MEDICINE
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
## 2025-02-27 ENCOUNTER — APPOINTMENT (OUTPATIENT)
Dept: OTOLARYNGOLOGY | Facility: CLINIC | Age: 40
End: 2025-02-27
Payer: COMMERCIAL

## 2025-03-10 ENCOUNTER — TELEMEDICINE (OUTPATIENT)
Dept: BEHAVIORAL HEALTH | Facility: HOSPITAL | Age: 40
End: 2025-03-10
Payer: MEDICARE

## 2025-03-10 DIAGNOSIS — F33.1 MODERATE EPISODE OF RECURRENT MAJOR DEPRESSIVE DISORDER: ICD-10-CM

## 2025-03-10 DIAGNOSIS — F43.10 PTSD (POST-TRAUMATIC STRESS DISORDER): ICD-10-CM

## 2025-03-10 DIAGNOSIS — F41.1 GENERALIZED ANXIETY DISORDER: ICD-10-CM

## 2025-03-10 PROCEDURE — 99214 OFFICE O/P EST MOD 30 MIN: CPT | Performed by: STUDENT IN AN ORGANIZED HEALTH CARE EDUCATION/TRAINING PROGRAM
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
## 2025-03-11 ENCOUNTER — ANCILLARY PROCEDURE (OUTPATIENT)
Dept: URGENT CARE | Age: 40
End: 2025-03-11
Payer: COMMERCIAL

## 2025-03-11 ENCOUNTER — OFFICE VISIT (OUTPATIENT)
Dept: URGENT CARE | Age: 40
End: 2025-03-11
Payer: COMMERCIAL

## 2025-03-11 VITALS — SYSTOLIC BLOOD PRESSURE: 100 MMHG | OXYGEN SATURATION: 96 % | HEART RATE: 82 BPM | DIASTOLIC BLOOD PRESSURE: 66 MMHG

## 2025-03-11 DIAGNOSIS — S60.059A: Primary | ICD-10-CM

## 2025-03-11 DIAGNOSIS — S67.10XA CRUSHING INJURY OF FINGER, INITIAL ENCOUNTER: ICD-10-CM

## 2025-03-11 PROCEDURE — 99213 OFFICE O/P EST LOW 20 MIN: CPT

## 2025-03-11 PROCEDURE — 73140 X-RAY EXAM OF FINGER(S): CPT | Mod: LEFT SIDE

## 2025-03-11 ASSESSMENT — ENCOUNTER SYMPTOMS
JOINT SWELLING: 1
COLOR CHANGE: 1
WHEEZING: 0
DIARRHEA: 0
SHORTNESS OF BREATH: 0
NAUSEA: 0
FEVER: 0
VOMITING: 0
WOUND: 0
ARTHRALGIAS: 1
CHILLS: 0
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
## 2025-03-11 NOTE — PATIENT INSTRUCTIONS
Rest, Ice Elevate when you can    May use finger splint for comfort and protection.    If no improvement, follow up with pediatrician or orthopedic    Negative xray on wet read, will confirm with radiology read, results will be in mychart.    If any additional findings we will give you a call    Follow up with your primary care provider.
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
## 2025-03-13 ENCOUNTER — APPOINTMENT (OUTPATIENT)
Dept: NEUROLOGY | Facility: CLINIC | Age: 40
End: 2025-03-13
Payer: COMMERCIAL

## 2025-03-13 DIAGNOSIS — G43.709 CHRONIC MIGRAINE WITHOUT AURA WITHOUT STATUS MIGRAINOSUS, NOT INTRACTABLE: Primary | ICD-10-CM

## 2025-03-13 PROCEDURE — 99213 OFFICE O/P EST LOW 20 MIN: CPT | Performed by: STUDENT IN AN ORGANIZED HEALTH CARE EDUCATION/TRAINING PROGRAM

## 2025-03-13 RX ORDER — GALCANEZUMAB 120 MG/ML
240 INJECTION, SOLUTION SUBCUTANEOUS ONCE
Qty: 1 EACH | Refills: 0 | Status: SHIPPED | OUTPATIENT
Start: 2025-03-13 | End: 2025-03-13

## 2025-03-13 RX ORDER — GALCANEZUMAB 120 MG/ML
120 INJECTION, SOLUTION SUBCUTANEOUS
Qty: 1 EACH | Refills: 10 | Status: SHIPPED | OUTPATIENT
Start: 2025-03-13

## 2025-03-18 DIAGNOSIS — G43.709 CHRONIC MIGRAINE WITHOUT AURA WITHOUT STATUS MIGRAINOSUS, NOT INTRACTABLE: ICD-10-CM

## 2025-03-18 RX ORDER — GALCANEZUMAB 120 MG/ML
240 INJECTION, SOLUTION SUBCUTANEOUS ONCE
Qty: 1 EACH | Refills: 0 | Status: SHIPPED | OUTPATIENT
Start: 2025-03-18 | End: 2025-03-18

## 2025-03-18 RX ORDER — GALCANEZUMAB 120 MG/ML
120 INJECTION, SOLUTION SUBCUTANEOUS
Qty: 1 EACH | Refills: 10 | Status: SHIPPED | OUTPATIENT
Start: 2025-03-18 | End: 2025-03-19

## 2025-03-18 NOTE — TELEPHONE ENCOUNTER
Called and spoke with pt pharmacy. They state they can not fill the medication, her insurance states she has to fill her medications at [facility].  Called and spoke with pt. Informed. Pharmacy on file is updated.

## 2025-03-19 ENCOUNTER — TELEPHONE (OUTPATIENT)
Dept: NEUROLOGY | Facility: CLINIC | Age: 40
End: 2025-03-19
Payer: COMMERCIAL

## 2025-03-19 DIAGNOSIS — F33.1 MODERATE EPISODE OF RECURRENT MAJOR DEPRESSIVE DISORDER: ICD-10-CM

## 2025-03-19 DIAGNOSIS — G43.709 CHRONIC MIGRAINE WITHOUT AURA WITHOUT STATUS MIGRAINOSUS, NOT INTRACTABLE: Primary | ICD-10-CM

## 2025-03-19 RX ORDER — TRAZODONE HYDROCHLORIDE 50 MG/1
25-50 TABLET ORAL NIGHTLY PRN
Qty: 30 TABLET | Refills: 1 | Status: SHIPPED | OUTPATIENT
Start: 2025-03-19 | End: 2025-05-18

## 2025-03-24 ENCOUNTER — TELEMEDICINE (OUTPATIENT)
Dept: PRIMARY CARE | Facility: CLINIC | Age: 40
End: 2025-03-24
Payer: MEDICARE

## 2025-03-24 DIAGNOSIS — J06.9 ACUTE URI: Primary | ICD-10-CM

## 2025-03-24 DIAGNOSIS — J06.9 ACUTE URI: ICD-10-CM

## 2025-03-24 DIAGNOSIS — J32.0 MAXILLARY SINUSITIS, UNSPECIFIED CHRONICITY: ICD-10-CM

## 2025-03-24 PROCEDURE — 1036F TOBACCO NON-USER: CPT | Performed by: STUDENT IN AN ORGANIZED HEALTH CARE EDUCATION/TRAINING PROGRAM

## 2025-03-24 PROCEDURE — 99214 OFFICE O/P EST MOD 30 MIN: CPT | Performed by: STUDENT IN AN ORGANIZED HEALTH CARE EDUCATION/TRAINING PROGRAM

## 2025-03-24 RX ORDER — FLUTICASONE PROPIONATE 50 MCG
1 SPRAY, SUSPENSION (ML) NASAL 2 TIMES DAILY
Qty: 16 G | Refills: 1 | Status: SHIPPED | OUTPATIENT
Start: 2025-03-24

## 2025-03-24 RX ORDER — DOXYCYCLINE 100 MG/1
100 CAPSULE ORAL 2 TIMES DAILY
Qty: 20 CAPSULE | Refills: 0 | Status: SHIPPED | OUTPATIENT
Start: 2025-03-24 | End: 2025-04-03

## 2025-03-24 RX ORDER — AZELASTINE 1 MG/ML
1 SPRAY, METERED NASAL 2 TIMES DAILY
Qty: 30 ML | Refills: 2 | Status: SHIPPED | OUTPATIENT
Start: 2025-03-24

## 2025-03-24 RX ORDER — FLUTICASONE PROPIONATE 50 MCG
1 SPRAY, SUSPENSION (ML) NASAL 2 TIMES DAILY
Qty: 16 G | Refills: 1 | Status: SHIPPED | OUTPATIENT
Start: 2025-03-24 | End: 2025-03-24

## 2025-04-01 ENCOUNTER — TELEMEDICINE (OUTPATIENT)
Dept: PRIMARY CARE | Facility: CLINIC | Age: 40
End: 2025-04-01
Payer: MEDICARE

## 2025-04-01 DIAGNOSIS — F41.1 GENERALIZED ANXIETY DISORDER: Primary | ICD-10-CM

## 2025-04-01 PROCEDURE — 99213 OFFICE O/P EST LOW 20 MIN: CPT | Performed by: STUDENT IN AN ORGANIZED HEALTH CARE EDUCATION/TRAINING PROGRAM

## 2025-04-01 NOTE — PROGRESS NOTES
Subjective   Patient ID: Rosibel Staton is a 39 y.o. female who presents for Panic Attack.    HPI   Patient is contacting the office due to having signs/symptoms worsening anxiety    She just found out that I am unfortunately leaving [facility] on July 31 and now wishes to discuss finding a new provider    She has been my patient for the past 4+ years and now wondering what she will do when I leave the system as she unfortunately cannot afford where I will be going/practicing    Asking for advice/recommendations  Also mention that she did reach out to her psychiatrist to be further evaluate/treated    Review of Systems  All pertinent positive symptoms are included in the history of present illness.    All other systems have been reviewed and are negative and noncontributory to this patient's current ailments.    Objective   There were no vitals taken for this visit.    Physical Exam  CONSTITUTIONAL - well nourished, well developed, looks like stated age, in no acute distress, not ill-appearing, and not tired appearing, very anxious on the phone  SKIN - normal skin color and pigmentation, normal skin turgor without rash, lesions, or nodules visualized  HEAD - no trauma, normocephalic  EYES - normal external exam  CHEST -no distressed breathing, good effort  EXTREMITIES - no edema, no deformities  NEUROLOGICAL - normal balance, normal motor, no ataxia  PSYCHIATRIC - alert, pleasant and cordial, age-appropriate    Assessment/Plan   We had a long conversation about your anxiety at this point I understand that we have a great patient/physician relationship and I am so sorry for breaking the news that I am leaving [facility]    I will have my office staff reach out for options for other providers/physicians that you can establish with at your earliest mutual convenience    Until then, please follow-up with myself if/when you need anything until July 31

## 2025-04-10 ENCOUNTER — APPOINTMENT (OUTPATIENT)
Dept: OTOLARYNGOLOGY | Facility: CLINIC | Age: 40
End: 2025-04-10
Payer: COMMERCIAL

## 2025-04-10 ENCOUNTER — EVALUATION (OUTPATIENT)
Dept: SPEECH THERAPY | Facility: CLINIC | Age: 40
End: 2025-04-10
Payer: MEDICARE

## 2025-04-10 VITALS — BODY MASS INDEX: 19.61 KG/M2 | HEIGHT: 66 IN | WEIGHT: 122 LBS

## 2025-04-10 DIAGNOSIS — R49.8 VOICE FATIGUE: ICD-10-CM

## 2025-04-10 DIAGNOSIS — R49.8 OTHER VOICE AND RESONANCE DISORDERS: ICD-10-CM

## 2025-04-10 DIAGNOSIS — R49.0 DYSPHONIA: ICD-10-CM

## 2025-04-10 DIAGNOSIS — J38.7 LEUKOPLAKIA OF LARYNX: ICD-10-CM

## 2025-04-10 DIAGNOSIS — J38.1 REINKE'S EDEMA OF VOCAL FOLDS: ICD-10-CM

## 2025-04-10 DIAGNOSIS — R49.0 MUSCLE TENSION DYSPHONIA: ICD-10-CM

## 2025-04-10 DIAGNOSIS — J38.7 LEUKOPLAKIA OF LARYNX: Primary | ICD-10-CM

## 2025-04-10 DIAGNOSIS — R49.0 VOICE HOARSENESS: ICD-10-CM

## 2025-04-10 DIAGNOSIS — R48.8 ANOMIA: Primary | ICD-10-CM

## 2025-04-10 PROCEDURE — 99213 OFFICE O/P EST LOW 20 MIN: CPT | Performed by: OTOLARYNGOLOGY

## 2025-04-10 PROCEDURE — 3008F BODY MASS INDEX DOCD: CPT | Performed by: OTOLARYNGOLOGY

## 2025-04-10 PROCEDURE — 31579 LARYNGOSCOPY TELESCOPIC: CPT | Performed by: OTOLARYNGOLOGY

## 2025-04-10 PROCEDURE — 92524 BEHAVRAL QUALIT ANALYS VOICE: CPT | Mod: GN | Performed by: SPEECH-LANGUAGE PATHOLOGIST

## 2025-04-10 ASSESSMENT — PATIENT HEALTH QUESTIONNAIRE - PHQ9
1. LITTLE INTEREST OR PLEASURE IN DOING THINGS: NEARLY EVERY DAY
3. TROUBLE FALLING OR STAYING ASLEEP OR SLEEPING TOO MUCH: NEARLY EVERY DAY
2. FEELING DOWN, DEPRESSED OR HOPELESS: NEARLY EVERY DAY
9. THOUGHTS THAT YOU WOULD BE BETTER OFF DEAD, OR OF HURTING YOURSELF: NOT AT ALL
4. FEELING TIRED OR HAVING LITTLE ENERGY: NEARLY EVERY DAY
8. MOVING OR SPEAKING SO SLOWLY THAT OTHER PEOPLE COULD HAVE NOTICED. OR THE OPPOSITE, BEING SO FIGETY OR RESTLESS THAT YOU HAVE BEEN MOVING AROUND A LOT MORE THAN USUAL: NEARLY EVERY DAY
7. TROUBLE CONCENTRATING ON THINGS, SUCH AS READING THE NEWSPAPER OR WATCHING TELEVISION: NEARLY EVERY DAY
5. POOR APPETITE OR OVEREATING: NEARLY EVERY DAY
10. IF YOU CHECKED OFF ANY PROBLEMS, HOW DIFFICULT HAVE THESE PROBLEMS MADE IT FOR YOU TO DO YOUR WORK, TAKE CARE OF THINGS AT HOME, OR GET ALONG WITH OTHER PEOPLE: EXTREMELY DIFFICULT
6. FEELING BAD ABOUT YOURSELF - OR THAT YOU ARE A FAILURE OR HAVE LET YOURSELF OR YOUR FAMILY DOWN: NEARLY EVERY DAY
SUM OF ALL RESPONSES TO PHQ9 QUESTIONS 1 AND 2: 6
SUM OF ALL RESPONSES TO PHQ QUESTIONS 1-9: 24

## 2025-04-10 ASSESSMENT — PAIN - FUNCTIONAL ASSESSMENT: PAIN_FUNCTIONAL_ASSESSMENT: 0-10

## 2025-04-10 ASSESSMENT — PAIN SCALES - GENERAL: PAINLEVEL_OUTOF10: 0 - NO PAIN
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
## 2025-04-15 ENCOUNTER — APPOINTMENT (OUTPATIENT)
Facility: CLINIC | Age: 40
End: 2025-04-15
Payer: MEDICARE

## 2025-04-15 ENCOUNTER — APPOINTMENT (OUTPATIENT)
Dept: PSYCHOLOGY | Facility: HOSPITAL | Age: 40
End: 2025-04-15
Payer: MEDICARE

## 2025-04-15 DIAGNOSIS — Z87.820 HISTORY OF MULTIPLE CONCUSSIONS: ICD-10-CM

## 2025-04-15 DIAGNOSIS — R41.3 MEMORY DIFFICULTY: Primary | ICD-10-CM

## 2025-04-15 PROCEDURE — 96138 PSYCL/NRPSYC TECH 1ST: CPT | Mod: AH | Performed by: CLINICAL NEUROPSYCHOLOGIST

## 2025-04-15 PROCEDURE — 96139 PSYCL/NRPSYC TST TECH EA: CPT | Mod: AH | Performed by: CLINICAL NEUROPSYCHOLOGIST

## 2025-04-15 PROCEDURE — 96116 NUBHVL XM PHYS/QHP 1ST HR: CPT | Mod: AH | Performed by: CLINICAL NEUROPSYCHOLOGIST

## 2025-04-15 PROCEDURE — 96132 NRPSYC TST EVAL PHYS/QHP 1ST: CPT | Mod: AH | Performed by: CLINICAL NEUROPSYCHOLOGIST

## 2025-04-15 PROCEDURE — 96133 NRPSYC TST EVAL PHYS/QHP EA: CPT | Mod: AH | Performed by: CLINICAL NEUROPSYCHOLOGIST

## 2025-04-23 ENCOUNTER — APPOINTMENT (OUTPATIENT)
Dept: BEHAVIORAL HEALTH | Facility: CLINIC | Age: 40
End: 2025-04-23
Payer: COMMERCIAL

## 2025-04-23 DIAGNOSIS — F41.1 GENERALIZED ANXIETY DISORDER: ICD-10-CM

## 2025-04-23 DIAGNOSIS — F33.1 MODERATE EPISODE OF RECURRENT MAJOR DEPRESSIVE DISORDER: ICD-10-CM

## 2025-04-23 DIAGNOSIS — F43.10 PTSD (POST-TRAUMATIC STRESS DISORDER): ICD-10-CM

## 2025-04-23 PROCEDURE — 99214 OFFICE O/P EST MOD 30 MIN: CPT | Performed by: STUDENT IN AN ORGANIZED HEALTH CARE EDUCATION/TRAINING PROGRAM
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
## 2025-05-22 ENCOUNTER — TELEPHONE (OUTPATIENT)
Dept: BEHAVIORAL HEALTH | Facility: CLINIC | Age: 40
End: 2025-05-22
Payer: COMMERCIAL

## 2025-05-24 ENCOUNTER — TELEPHONE (OUTPATIENT)
Dept: BEHAVIORAL HEALTH | Facility: CLINIC | Age: 40
End: 2025-05-24

## 2025-05-24 DIAGNOSIS — F33.1 MODERATE EPISODE OF RECURRENT MAJOR DEPRESSIVE DISORDER: ICD-10-CM

## 2025-05-24 DIAGNOSIS — R11.0 NAUSEA: ICD-10-CM

## 2025-05-24 DIAGNOSIS — F43.10 PTSD (POST-TRAUMATIC STRESS DISORDER): ICD-10-CM

## 2025-05-24 RX ORDER — MIRTAZAPINE 15 MG/1
15 TABLET, FILM COATED ORAL NIGHTLY
Qty: 30 TABLET | Refills: 1 | Status: SHIPPED | OUTPATIENT
Start: 2025-05-24 | End: 2025-07-23

## 2025-05-24 RX ORDER — ONDANSETRON 8 MG/1
8 TABLET, FILM COATED ORAL EVERY 12 HOURS PRN
Qty: 20 TABLET | Refills: 11 | Status: SHIPPED | OUTPATIENT
Start: 2025-05-24

## 2025-05-30 PROBLEM — R41.3 MEMORY DIFFICULTY: Status: ACTIVE | Noted: 2025-05-30

## 2025-05-30 PROBLEM — Z87.820 HISTORY OF MULTIPLE CONCUSSIONS: Status: ACTIVE | Noted: 2025-05-30

## 2025-05-30 NOTE — PROGRESS NOTES
Neuropsychological Consultation    Name (, MRN): Rosibel MOSER (1985, [id number])  Exam Date:  04/15/2025  Referring:  Alexandro Arias MD, Neurology    REASON FOR EVALUATION:    Memory Difficulty Following Concussion

## 2025-06-10 ENCOUNTER — OFFICE VISIT (OUTPATIENT)
Dept: PRIMARY CARE | Facility: CLINIC | Age: 40
End: 2025-06-10
Payer: MEDICARE

## 2025-06-10 VITALS
HEART RATE: 97 BPM | HEIGHT: 66 IN | BODY MASS INDEX: 20.89 KG/M2 | SYSTOLIC BLOOD PRESSURE: 124 MMHG | WEIGHT: 130 LBS | DIASTOLIC BLOOD PRESSURE: 80 MMHG

## 2025-06-10 DIAGNOSIS — J01.90 ACUTE SINUSITIS, RECURRENCE NOT SPECIFIED, UNSPECIFIED LOCATION: Primary | ICD-10-CM

## 2025-06-10 PROCEDURE — 4004F PT TOBACCO SCREEN RCVD TLK: CPT | Performed by: STUDENT IN AN ORGANIZED HEALTH CARE EDUCATION/TRAINING PROGRAM

## 2025-06-10 PROCEDURE — 99214 OFFICE O/P EST MOD 30 MIN: CPT | Performed by: STUDENT IN AN ORGANIZED HEALTH CARE EDUCATION/TRAINING PROGRAM

## 2025-06-10 PROCEDURE — 3008F BODY MASS INDEX DOCD: CPT | Performed by: STUDENT IN AN ORGANIZED HEALTH CARE EDUCATION/TRAINING PROGRAM

## 2025-06-10 RX ORDER — DOXYCYCLINE 100 MG/1
100 CAPSULE ORAL 2 TIMES DAILY
Qty: 14 CAPSULE | Refills: 0 | Status: SHIPPED | OUTPATIENT
Start: 2025-06-12 | End: 2025-06-19

## 2025-06-10 RX ORDER — METHYLPREDNISOLONE 4 MG/1
TABLET ORAL
Qty: 21 TABLET | Refills: 0 | Status: SHIPPED | OUTPATIENT
Start: 2025-06-12

## 2025-06-10 NOTE — PROGRESS NOTES
"Subjective   Patient ID: Rosibel Staton is a 40 y.o. female who presents for Sinusitis.  Today she is accompanied by alone.     HPI  Patient presents with sinusitis  States that she is at the beginning of a sinus infection  Complaints of congestion, runny nose, sinus headache, clear nasal drainage and mucus production.  Denies any cough, sore throat.  Has not tested herself for flu or COVID  Has not currently tried any over-the-counter medications.  Denies any fevers, shortness of breath, chest pain, lightheadedness, dizziness, abdominal symptoms such as diarrhea or constipation.  History of recurrent sinusitis infections requiring antibiotics and steroids.      Medications Ordered Prior to Encounter[1]     Allergies[2]    Immunization History   Administered Date(s) Administered    Tdap vaccine, age 7 year and older (BOOSTRIX, ADACEL) 01/15/2014, 10/19/2015         Review of Systems  All pertinent positive symptoms are included in the history of present illness.  All other systems have been reviewed and are negative and noncontributory to this patient's current ailments.     Objective   /80   Pulse 97   Ht 1.676 m (5' 6\")   Wt 59 kg (130 lb)   BMI 20.98 kg/m²   BSA: 1.66 meters squared  No visits with results within 1 Month(s) from this visit.   Latest known visit with results is:   Office Visit on 02/04/2025   Component Date Value Ref Range Status    NEISSERIA GONORRHOEAE RNA, TMA, UR* 02/04/2025 NOT DETECTED  NOT DETECTED Final    (Always Message) 02/04/2025    Final    Comment: The analytical performance characteristics of this  assay, when used to test SurePath(TM) specimens have been  determined by [employer]. The modifications have  not been cleared or approved by the FDA. This assay has  been validated pursuant to the CLIA regulations and is  used for clinical purposes.     For additional information, please refer to  https://education.Adter[employer].com/faq/VMI743  (This link is being " provided for information/  educational purposes only.)         CHLAMYDIA TRACHOMATIS RNA, TMA, UR* 02/04/2025 NOT DETECTED  NOT DETECTED Final    (Always Message) 02/04/2025    Final    Comment: The analytical performance characteristics of this  assay, when used to test SurePath(TM) specimens have been  determined by [employer]. The modifications have  not been cleared or approved by the FDA. This assay has  been validated pursuant to the CLIA regulations and is  used for clinical purposes.     For additional information, please refer to  https://[employer].Myagi/faq/EEU399  (This link is being provided for information/  educational purposes only.)         TRICHOMONAS VAGINALIS RNA, QL TMA 02/04/2025 NOT DETECTED  NOT DETECTED Final    Comment: For additional information, please refer to  [email or web address]  [email or web address]  (This link is being provided for informational/  educational purposes only.)         BV SMEAR KARL SCORE 02/04/2025 SEE NOTE   Final    Comment:     BV SMEAR KARL SCORE         Micro Number:      [id number]    Test Status:       Final    Specimen Source:   Not given    Specimen Quality:  Adequate    Gram Stain:        Normal vaginal anna observed         Physical Exam  CONSTITUTIONAL - well nourished, well developed, looks like stated age, in no acute distress, not ill-appearing, and not tired appearing  SKIN - normal skin color and pigmentation, normal skin turgor without rash, lesions, or nodules visualized  HEAD - no trauma, normocephalic  EYES - normal external exam  CHEST -no distressed breathing, good effort  EXTREMITIES - no edema, no deformities  NEUROLOGICAL - normal balance, normal motor, no ataxia  PSYCHIATRIC - alert, pleasant and cordial, age-appropriate      Assessment/Plan     We recommend that you try nasal sprays at this time to help with your symptoms such as Flonase or Astelin.  Patient does have prescriptions at home and does  not need refills.  Due to your history of recurrent sinus infections, we will send a prescription for doxycycline as well as a Medrol Dosepak.  You may  these prescriptions 2 days from now if your symptoms have not improved.  Risks, benefits, and options of treatment(s) were discussed after reviewing all current medication(s) and drug allergy(ies)  I opted for the treatment that we discussed with instructions on the medication use for your underlying medical ailment(s)  I encouraged supportive care such as rest, fluids and Advil/Tylenol as warranted  Return to the clinic in 7-10 days or sooner if symptoms worsen or persist as we will then further evaluate         [1]   Current Outpatient Medications on File Prior to Visit   Medication Sig Dispense Refill    ARIPiprazole (Abilify) 20 mg tablet Take 1 tablet (20 mg) by mouth once daily. 90 tablet 3    atorvastatin (Lipitor) 40 mg tablet TAKE ONE TABLET BY MOUTH ONCE DAILY AT BEDTIME 90 tablet 0    azelastine (Astelin) 137 mcg (0.1 %) nasal spray Administer 1 spray into each nostril 2 times a day. 30 mL 2    clonazePAM (KlonoPIN) 1 mg tablet Take 1 tablet (1 mg) by mouth 3 times a day as needed for anxiety. 90 tablet 2    estradiol (Vagifem) 10 mcg tablet vaginal tablet Insert 1 tablet (10 mcg) into the vagina 2 times a week. Insert one tablet into the vagina daily for 14 days then continue using twice weekly 34 tablet 3    fluocinolone (DermOtic) 0.01 % ear drops Administer 5 drops into the left ear 2 times a day. (Patient not taking: No sig reported) 20 mL 0    fluticasone (Flonase) 50 mcg/actuation nasal spray USE 1 SPRAY(S) IN EACH NOSTRIL TWICE DAILY 16 g 1    fremanezumab (Ajovy) 225 mg/1.5 mL auto-injector Inject 1 Pen (225 mg) under the skin every 30 (thirty) days. 1 each 11    hydrOXYzine pamoate (Vistaril) 50 mg capsule Take 1 capsule (50 mg) by mouth every 6 hours if needed for anxiety. 90 capsule 11    melatonin 10 mg tablet Take 2 tablets (20 mg) by  mouth once daily at bedtime. (Patient not taking: No sig reported)      metaxalone (Skelaxin) 800 mg tablet Take 1 tablet (800 mg) by mouth 3 times a day as needed. (Patient not taking: No sig reported)      mirtazapine (Remeron) 15 mg tablet Take 1 tablet (15 mg) by mouth once daily at bedtime. 30 tablet 1    naratriptan (Amerge) 2.5 mg tablet Take 1 tablet (2.5 mg) by mouth 1 time if needed for migraine (may repeat x1). 9 tablet 5    ondansetron (Zofran) 8 mg tablet Take 1 tablet (8 mg) by mouth every 12 hours if needed for nausea. 20 tablet 11    propranolol (Inderal) 10 mg tablet Take 1 tablet (10 mg) by mouth 3 times a day. 90 tablet 11    traZODone (Desyrel) 50 mg tablet Take 0.5-1 tablets (25-50 mg) by mouth as needed at bedtime for sleep. 30 tablet 1     No current facility-administered medications on file prior to visit.   [2]   Allergies  Allergen Reactions    Nortriptyline Psychosis    Topamax [Topiramate] Psychosis     Per pt suicidal    Amoxicillin-Pot Clavulanate Unknown    Azithromycin Unknown    Bacitracin Zinc-Polymyxin B Unknown    Ciprofloxacin Unknown    Citalopram Unknown    Duloxetine Unknown    Gabapentin Unknown    Gadolinium-Containing Contrast Media Unknown    Lithium Unknown    Metaxalone Unknown    Nickel Unknown    Nitrofurantoin Monohyd/M-Cryst Unknown    Nurtec Odt [Rimegepant] Nausea/vomiting    Prednisone Unknown    Quetiapine Unknown    Qulipta [Atogepant] Drowsiness and Nausea/vomiting    Scopolamine Unknown    Sertraline Unknown    Sulfamethoxazole-Trimethoprim Unknown    Tramadol Unknown    Adhesive Other    Cyclosporine Rash

## 2025-06-19 ENCOUNTER — APPOINTMENT (OUTPATIENT)
Dept: PRIMARY CARE | Facility: CLINIC | Age: 40
End: 2025-06-19
Payer: MEDICARE

## 2025-06-20 ENCOUNTER — OFFICE VISIT (OUTPATIENT)
Dept: CARDIOLOGY | Facility: CLINIC | Age: 40
End: 2025-06-20
Payer: COMMERCIAL

## 2025-06-20 VITALS — HEART RATE: 78 BPM | DIASTOLIC BLOOD PRESSURE: 82 MMHG | SYSTOLIC BLOOD PRESSURE: 118 MMHG | OXYGEN SATURATION: 90 %

## 2025-06-20 DIAGNOSIS — R00.2 PALPITATIONS: Primary | ICD-10-CM

## 2025-06-20 PROCEDURE — 99214 OFFICE O/P EST MOD 30 MIN: CPT | Performed by: INTERNAL MEDICINE

## 2025-06-20 PROCEDURE — 99212 OFFICE O/P EST SF 10 MIN: CPT

## 2025-06-20 PROCEDURE — 93005 ELECTROCARDIOGRAM TRACING: CPT | Performed by: INTERNAL MEDICINE

## 2025-06-20 PROCEDURE — 4004F PT TOBACCO SCREEN RCVD TLK: CPT | Performed by: INTERNAL MEDICINE

## 2025-06-20 ASSESSMENT — PAIN SCALES - GENERAL: PAINLEVEL_OUTOF10: 0-NO PAIN

## 2025-06-20 ASSESSMENT — COLUMBIA-SUICIDE SEVERITY RATING SCALE - C-SSRS
2. HAVE YOU ACTUALLY HAD ANY THOUGHTS OF KILLING YOURSELF?: NO
1. IN THE PAST MONTH, HAVE YOU WISHED YOU WERE DEAD OR WISHED YOU COULD GO TO SLEEP AND NOT WAKE UP?: NO
6. HAVE YOU EVER DONE ANYTHING, STARTED TO DO ANYTHING, OR PREPARED TO DO ANYTHING TO END YOUR LIFE?: NO

## 2025-06-20 ASSESSMENT — ENCOUNTER SYMPTOMS
LOSS OF SENSATION IN FEET: 0
OCCASIONAL FEELINGS OF UNSTEADINESS: 1
DEPRESSION: 0
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
## 2025-06-20 NOTE — PATIENT INSTRUCTIONS
Thank you for coming to your cardiology visit.  Based on my review of your ER visit course, ECG and physical exam as well as history I do not see any active cardiac issues that are causing you to feel poorly at this time.  A heart rhythm abnormality is hard to exclude without an EKG or some other formal rhythm assessment to prove that your heart heart rate was an abnormal rhythm.  I do not think a long-term monitor would be all that helpful and I hesitate to do it since you had a reaction to the leads to the last time.  A device called a [employer] monitor  ordered off [employer] can occasionally be helpful in the situations.  Use it whenever you feel your heart races and contact me if it states there was an abnormality.  In the meantime I think it is safe you to return to work from a cardiac perspective.  Please stay well-hydrated and stay out of the heat of the day if possible.  My office number is [phone], call me with any questions.  You could also message me through the [employer]ar

## 2025-06-23 NOTE — PROGRESS NOTES
Subjective   Patient ID: Rosibel Staton is a 40 y.o. female who presents for No chief complaint on file..  HPI 40-year-old female presents today for lung nodule clinic.    Smoking history: cigarette smoker 3 packs  x 20 years- now down to 10 cigarettes a day  Hypnotized in April - -quit till 11/11/2024 - restarted.   Personal history of cancer: precancerous lesion in her throat removed 6/2024   Family history of lung cancer: Maternal aunt with breast cancer.    Telehealth visit between Rosibel Staton and Berta Holland CNP at [facility] lung nodule clinic per patient request.    Chest x-ray dated 6/17/2025  Several calcified lymph nodes in the left hilar region compatible with   remote granulomatous disease.     CT abdomen pelvis without IV contrast dated 2/23/2023-clear lung bases.  Review of Systems  Review of systems: Present-fatigue. Not present-chills and fever.  Respiratory: Not present-difficulty breathing, cough, bloody sputum.  Cardiovascular: intermittent bouts of light headedness.  Not present-chest pain, palpitations, dyspnea on exertion.  Objective   There were no vitals taken for this visit.     Physical Exam  Gen.: Mental status-alert. Gen. appearance-cooperative, well groomed and consistent with stated age. Not in acute distress or sickly. Orientation-oriented to time, place, purpose and person. Build and nutrition-well-nourished and well-developed. Hydration-well-hydrated.    Assessment/Plan   Diagnoses and all orders for this visit:  Granulomatous adenopathy  Smoker

## 2025-06-24 ENCOUNTER — TELEPHONE (OUTPATIENT)
Dept: PRIMARY CARE | Facility: CLINIC | Age: 40
End: 2025-06-24

## 2025-06-24 ENCOUNTER — APPOINTMENT (OUTPATIENT)
Dept: PRIMARY CARE | Facility: CLINIC | Age: 40
End: 2025-06-24
Payer: COMMERCIAL

## 2025-06-24 ENCOUNTER — TELEMEDICINE (OUTPATIENT)
Dept: PRIMARY CARE | Facility: CLINIC | Age: 40
End: 2025-06-24
Payer: COMMERCIAL

## 2025-06-24 ENCOUNTER — TELEPHONE (OUTPATIENT)
Dept: NEUROLOGY | Facility: CLINIC | Age: 40
End: 2025-06-24

## 2025-06-24 VITALS
WEIGHT: 125.8 LBS | OXYGEN SATURATION: 99 % | BODY MASS INDEX: 20.3 KG/M2 | DIASTOLIC BLOOD PRESSURE: 70 MMHG | SYSTOLIC BLOOD PRESSURE: 100 MMHG | HEART RATE: 101 BPM

## 2025-06-24 DIAGNOSIS — D71 GRANULOMATOUS DISEASE (MULTI): ICD-10-CM

## 2025-06-24 DIAGNOSIS — B96.89 BACTERIAL VAGINOSIS: ICD-10-CM

## 2025-06-24 DIAGNOSIS — R59.9 GRANULOMATOUS ADENOPATHY: Primary | ICD-10-CM

## 2025-06-24 DIAGNOSIS — F41.1 GENERALIZED ANXIETY DISORDER: ICD-10-CM

## 2025-06-24 DIAGNOSIS — R53.83 OTHER FATIGUE: ICD-10-CM

## 2025-06-24 DIAGNOSIS — F17.200 SMOKER: ICD-10-CM

## 2025-06-24 DIAGNOSIS — N76.0 BACTERIAL VAGINOSIS: ICD-10-CM

## 2025-06-24 DIAGNOSIS — R55 VASOVAGAL NEAR-SYNCOPE: ICD-10-CM

## 2025-06-24 DIAGNOSIS — R00.2 PALPITATIONS: Primary | ICD-10-CM

## 2025-06-24 PROCEDURE — 4004F PT TOBACCO SCREEN RCVD TLK: CPT | Performed by: STUDENT IN AN ORGANIZED HEALTH CARE EDUCATION/TRAINING PROGRAM

## 2025-06-24 PROCEDURE — 4004F PT TOBACCO SCREEN RCVD TLK: CPT | Performed by: NURSE PRACTITIONER

## 2025-06-24 PROCEDURE — 99214 OFFICE O/P EST MOD 30 MIN: CPT | Performed by: STUDENT IN AN ORGANIZED HEALTH CARE EDUCATION/TRAINING PROGRAM

## 2025-06-24 PROCEDURE — 99202 OFFICE O/P NEW SF 15 MIN: CPT | Performed by: NURSE PRACTITIONER

## 2025-06-24 RX ORDER — METRONIDAZOLE 7.5 MG/G
1 GEL VAGINAL NIGHTLY
Qty: 70 G | Refills: 0 | Status: SHIPPED | OUTPATIENT
Start: 2025-06-24

## 2025-06-24 SDOH — ECONOMIC STABILITY: FOOD INSECURITY: WITHIN THE PAST 12 MONTHS, THE FOOD YOU BOUGHT JUST DIDN'T LAST AND YOU DIDN'T HAVE MONEY TO GET MORE.: SOMETIMES TRUE

## 2025-06-24 SDOH — ECONOMIC STABILITY: FOOD INSECURITY: WITHIN THE PAST 12 MONTHS, YOU WORRIED THAT YOUR FOOD WOULD RUN OUT BEFORE YOU GOT MONEY TO BUY MORE.: SOMETIMES TRUE

## 2025-06-24 ASSESSMENT — LIFESTYLE VARIABLES
SKIP TO QUESTIONS 9-10: 1
HOW OFTEN DO YOU HAVE SIX OR MORE DRINKS ON ONE OCCASION: NEVER
AUDIT-C TOTAL SCORE: 2
HOW OFTEN DO YOU HAVE A DRINK CONTAINING ALCOHOL: 2-4 TIMES A MONTH
HOW MANY STANDARD DRINKS CONTAINING ALCOHOL DO YOU HAVE ON A TYPICAL DAY: 1 OR 2

## 2025-06-24 ASSESSMENT — ENCOUNTER SYMPTOMS
LOSS OF SENSATION IN FEET: 0
DEPRESSION: 1
OCCASIONAL FEELINGS OF UNSTEADINESS: 1

## 2025-06-24 ASSESSMENT — PAIN SCALES - GENERAL: PAINLEVEL_OUTOF10: 0-NO PAIN

## 2025-06-24 NOTE — PROGRESS NOTES
"Subjective   Patient ID: Rosibel Staton is a 40 y.o. female who presents for Follow-up.    HPI   Patient is following up for recent ER visit    Unfortunately she went to the emergency room on 6/17 with signs/symptoms of a near syncopal episode with a racing heart    States that her watch noted that she was \"working out for 15 minutes \"even though she was just standing for prolonged period of time at work    She was feeling slightly lightheaded and even noted some chest pressure as well as palpitations    She did call EMS and was then evaluated at a local emergency room    She also noted some left leg pain when in the ER that felt different than her history of sciatica    Vital signs were stable in the emergency room as well as a negative physical examination      She did not have any risk factors for DVT or PE and noted that the EKG was well within normal limits  Initial troponin was negative and she did have a slight leukocytosis with a left shift  Otherwise all the other findings were negative including a chest x-ray as well as a DVT study for the left lower extremity    She then followed up with a cardiologist this past Friday on 6/20 who told her that this is most likely not heart related    It was recommended that she wears a heart monitor to make sure there is nothing else underlying  It was recommended that she order this device off of [employer] to monitor her heart rate    It was also recommended that she follows up as an as needed patient    Lastly, she also followed up with a nurse practitioner who specializes in lung disease within the lung nodule clinic  This was due to finding a few several calcified lymph nodes in the left hilar region compatible with remote granulomatous disease    Patient does have a chronic history of smoking as well    Was told that this is nothing to worry about    Patient now is coming into the office today to discuss all of her symptoms and wondering why or what could have " happened to cause her near syncopal episode    Unfortunately she has been dealing with a lot of stress at home including close family members, ex-, including a child 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    All other systems have been reviewed and are negative and noncontributory to this patient's current ailments.    Objective   /70 (BP Location: Left arm, Patient Position: Sitting, BP Cuff Size: Adult)   Pulse 101   Wt 57.1 kg (125 lb 12.8 oz)   SpO2 99%   BMI 20.30 kg/m²     Physical Exam  CONSTITUTIONAL - well nourished, well developed, looks like stated age, in no acute distress, not ill-appearing, and not tired appearing  SKIN - normal skin color and pigmentation, normal skin turgor without rash, lesions, or nodules visualized  HEAD - no trauma, normocephalic  EYES - normal external exam  CHEST -no distressed breathing, good effort  EXTREMITIES - no edema, no deformities  NEUROLOGICAL - normal balance, normal motor, no ataxia  PSYCHIATRIC - alert, pleasant and cordial, age-appropriate    Assessment/Plan     We had a long conversation about all of your signs/symptoms and I recommend you continue following up with your therapist as well as your psychiatrist as I feel that this is underlying anxiety that is not getting better    I am happy to hear that the cardiology provider is stating that it is not cardiac in nature    Otherwise, I do recommend you continue following up with the lung specialty providers to make sure there is nothing else that we are missing    At any point if you notice worsening or acute signs/symptoms please let me know and we will discuss this accordingly    Please follow-up closely with myself and let me know how you are doing        Time Spent  Prep time on day of patient encounter: 5 minutes  Time spent directly with patient, family  or caregiver: 25 minutes  Additional Time Spent on Patient Care Activities: 0 minutes  Documentation Time: 5 minutes  Other Time Spent: 0 minutes  Total: 35 minutes

## 2025-06-24 NOTE — PATIENT INSTRUCTIONS
Chest x-ray with several calcified hilar lymph nodes compatible with remote granulomatous disease.  History of leukoplakia of the throat and current smoker         [facility]  Lung Nodule Clinic      [address]   [address]   [place], [place] [address]   Phone [phone]   Nurse Coordinator: [phone]   Fax [phone]        Pulmonary Referral Notification      NEW ORDER: Pulmonary Outpatient  [facility]  [address]   [place] [address]  Phone: [phone]  Fax: [phone]    Please contact [facility] [phone]     Thank you, and we are here to support you with any additional care you may need.       Lung Nodule Clinic  [address]  [address]  [place], [place] [address]  Phone [phone]  Fax [phone]  Nurse Coordinator [phone]                                          Welcome to the [facility] Lung Nodule Clinic    Today was the initial consult with the lung nodule clinic to determine proper recommendations for follow up. Your care is coordinated to ensure timely management.  As you know, early detection of cancer is very important.  Nodules that are large, look suspicious or have changed over time is why further evaluation such as the additional imaging test that we have ordered is needed. Our clinic will work closely with you in choosing the best next step.       What is my next step?  We will assist with scheduling scans, results reviews, and referrals for priority appointments.      Who do I call?  Your care coordinator for the lung nodule clinic can be contacted at [phone]  All scheduling needs can be assisted within the Cardiac Surgery/Thoracic Surgery/Lung Nodule Clinic offices at [phone].              Table  Bernardo H, Aamir DP, Delores ESPINOZA, et al. Guidelines for Management of Incidental Pulmonary Nodules Detected on CT Images: From the Fleischner Society 2017. Radiology 2017;284:228-243.

## 2025-06-24 NOTE — TELEPHONE ENCOUNTER
Pt left a msg; was in the hospital last Tuesday and is due for her shot soon (?) and is wondering if it will hurt her?    Tel# [phone]

## 2025-06-24 NOTE — TELEPHONE ENCOUNTER
Please let her know that it should be fine to take her Ajovy shot.    Reviewed and approved by DIMPLE CAMERON on 6/24/25 at 4:20 PM.

## 2025-06-26 ENCOUNTER — TELEPHONE (OUTPATIENT)
Dept: OBSTETRICS AND GYNECOLOGY | Facility: CLINIC | Age: 40
End: 2025-06-26
Payer: COMMERCIAL

## 2025-06-26 LAB
ALBUMIN SERPL-MCNC: 4.5 G/DL (ref 3.6–5.1)
ALP SERPL-CCNC: 60 U/L (ref 31–125)
ALT SERPL-CCNC: 9 U/L (ref 6–29)
ANION GAP SERPL CALCULATED.4IONS-SCNC: 6 MMOL/L (CALC) (ref 7–17)
AST SERPL-CCNC: 12 U/L (ref 10–30)
BILIRUB SERPL-MCNC: 0.3 MG/DL (ref 0.2–1.2)
BUN SERPL-MCNC: 8 MG/DL (ref 7–25)
CALCIUM SERPL-MCNC: 9.5 MG/DL (ref 8.6–10.2)
CHLORIDE SERPL-SCNC: 105 MMOL/L (ref 98–110)
CO2 SERPL-SCNC: 31 MMOL/L (ref 20–32)
CREAT SERPL-MCNC: 0.71 MG/DL (ref 0.5–0.99)
EGFRCR SERPLBLD CKD-EPI 2021: 109 ML/MIN/1.73M2
ERYTHROCYTE [DISTWIDTH] IN BLOOD BY AUTOMATED COUNT: 12.2 % (ref 11–15)
GLUCOSE SERPL-MCNC: 77 MG/DL (ref 65–99)
HCT VFR BLD AUTO: 46.5 % (ref 35–45)
HGB BLD-MCNC: 15.5 G/DL (ref 11.7–15.5)
MCH RBC QN AUTO: 30.6 PG (ref 27–33)
MCHC RBC AUTO-ENTMCNC: 33.3 G/DL (ref 32–36)
MCV RBC AUTO: 91.9 FL (ref 80–100)
PLATELET # BLD AUTO: 273 THOUSAND/UL (ref 140–400)
PMV BLD REES-ECKER: 10.8 FL (ref 7.5–12.5)
POTASSIUM SERPL-SCNC: 4.4 MMOL/L (ref 3.5–5.3)
PROT SERPL-MCNC: 6.6 G/DL (ref 6.1–8.1)
RBC # BLD AUTO: 5.06 MILLION/UL (ref 3.8–5.1)
SODIUM SERPL-SCNC: 142 MMOL/L (ref 135–146)
WBC # BLD AUTO: 13 THOUSAND/UL (ref 3.8–10.8)

## 2025-06-26 NOTE — RESULT ENCOUNTER NOTE
Sugar, kidneys, liver, electro-'s are all within normal limits    Complete blood cell count still shows a slight elevation in white blood cell count but this is normalizing    Please let me know how the patient feels and if she has any signs/symptoms of any upper respiratory symptoms, UTI symptoms, or any other possibilities of infection    I think this is a low likelihood but I would to be fully thorough

## 2025-06-26 NOTE — TELEPHONE ENCOUNTER
I returned a call from Rosibel. Her PCP would like her to follow up with Dr. Berry for BV that has not responded to treatment for 1 year. She is frustrated with her PCP and questioning whether she needs to see gyn care if this is not responding to treatment. Discussed Health tracks swab and that this might be a possible option for finding and treating the causative bacteria in her case. She is interested in trying this and would like to make an appointment with Dr. Berry.

## 2025-06-26 NOTE — TELEPHONE ENCOUNTER
Patient Called in wanted to go over her blood work, and her PCP wanted her to contact us, and she's had BV for over a year and wanted to talk about it.     Pt requesting call back

## 2025-06-27 ENCOUNTER — TELEPHONE (OUTPATIENT)
Dept: NEUROLOGY | Facility: CLINIC | Age: 40
End: 2025-06-27
Payer: COMMERCIAL

## 2025-06-27 NOTE — TELEPHONE ENCOUNTER
"Pt left a message; did her Ajovy and 2 minutes later developed a rash and raised 2\" bump.    Tel# [phone]  "

## 2025-07-01 ENCOUNTER — APPOINTMENT (OUTPATIENT)
Dept: OBSTETRICS AND GYNECOLOGY | Facility: CLINIC | Age: 40
End: 2025-07-01
Payer: COMMERCIAL

## 2025-07-01 VITALS — DIASTOLIC BLOOD PRESSURE: 80 MMHG | WEIGHT: 132 LBS | BODY MASS INDEX: 21.31 KG/M2 | SYSTOLIC BLOOD PRESSURE: 120 MMHG

## 2025-07-01 DIAGNOSIS — N76.0 BV (BACTERIAL VAGINOSIS): Primary | ICD-10-CM

## 2025-07-01 DIAGNOSIS — N89.8 VAGINAL DISCHARGE: ICD-10-CM

## 2025-07-01 DIAGNOSIS — B96.89 BV (BACTERIAL VAGINOSIS): Primary | ICD-10-CM

## 2025-07-01 LAB
ATRIAL RATE: 80 BPM
P AXIS: 69 DEGREES
P OFFSET: 194 MS
P ONSET: 147 MS
PR INTERVAL: 150 MS
Q ONSET: 222 MS
QRS COUNT: 13 BEATS
QRS DURATION: 76 MS
QT INTERVAL: 402 MS
QTC CALCULATION(BAZETT): 463 MS
QTC FREDERICIA: 442 MS
R AXIS: 39 DEGREES
T AXIS: 47 DEGREES
T OFFSET: 423 MS
VENTRICULAR RATE: 80 BPM

## 2025-07-01 PROCEDURE — 99213 OFFICE O/P EST LOW 20 MIN: CPT | Performed by: NURSE PRACTITIONER

## 2025-07-01 NOTE — PROGRESS NOTES
Subjective   Patient ID: Rosibel Staton is a 40 y.o. female who presents for vaginal odor (Reviewing  EMR/Last Annual Exam: 07/05/2022 with Charline SUN-CNP/S/p Hysterectomy ).  Vaginal Discharge  The patient's primary symptoms include genital itching and vaginal discharge. This is a chronic problem. The current episode started more than 1 month ago. The problem occurs constantly. The problem has been unchanged. The patient is experiencing no pain. Pertinent negatives include no chills, dysuria or fever. The vaginal discharge was brown. There has been no bleeding. She has not been passing clots. She has not been passing tissue. Nothing aggravates the symptoms. The treatment provided no relief. She is sexually active. She uses hysterectomy for contraception.     Here with c/o chronic vaginal discharge and odor. Discharge is brown in color. Denies any itching. Denies any vaginal swelling. S/p hysterectomy. Denies any current dysuria. Denies any pelvic pain. No fever or chills. Has been treated for BV in the past.     Review of Systems   Constitutional:  Negative for chills and fever.   Genitourinary:  Positive for vaginal discharge. Negative for dysuria.   All other systems reviewed and are negative.      Objective   Physical Exam  Constitutional:       Appearance: Normal appearance.   Pulmonary:      Effort: Pulmonary effort is normal.   Genitourinary:     General: Normal vulva.      Vagina: Vaginal discharge present.      Uterus: Absent.       Comments: S/p hysterectomy cervix and uterus are absent.   Skin:     General: Skin is warm and dry.   Neurological:      Mental Status: She is alert.   Psychiatric:         Mood and Affect: Mood normal.         Behavior: Behavior normal.         Assessment/Plan   Diagnoses and all orders for this visit:  BV (bacterial vaginosis)  -     clindamycin (Cleocin) 300 mg capsule; Take 1 capsule (300 mg) by mouth 2 times a day for 10 days.  Vaginal discharge  -     fluconazole  (Diflucan) 150 mg tablet; Take 1 tablet (150 mg) by mouth 1 time for 1 dose. Repeat in 7 days if symptoms persist.  Health track swab obtained. Will alter treatment as needed pending results.        CLIFF Lopez 07/01/25 9:55 AM

## 2025-07-02 ENCOUNTER — APPOINTMENT (OUTPATIENT)
Dept: BEHAVIORAL HEALTH | Facility: CLINIC | Age: 40
End: 2025-07-02
Payer: COMMERCIAL

## 2025-07-02 DIAGNOSIS — F33.1 MODERATE EPISODE OF RECURRENT MAJOR DEPRESSIVE DISORDER: ICD-10-CM

## 2025-07-02 DIAGNOSIS — F43.10 PTSD (POST-TRAUMATIC STRESS DISORDER): ICD-10-CM

## 2025-07-02 DIAGNOSIS — F41.1 GENERALIZED ANXIETY DISORDER: ICD-10-CM

## 2025-07-02 PROCEDURE — 99214 OFFICE O/P EST MOD 30 MIN: CPT | Performed by: STUDENT IN AN ORGANIZED HEALTH CARE EDUCATION/TRAINING PROGRAM

## 2025-07-02 RX ORDER — CLONAZEPAM 1 MG/1
1 TABLET ORAL 3 TIMES DAILY PRN
Qty: 90 TABLET | Refills: 2 | Status: SHIPPED | OUTPATIENT
Start: 2025-07-02 | End: 2025-09-30
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
## 2025-07-03 ENCOUNTER — PATIENT MESSAGE (OUTPATIENT)
Dept: PRIMARY CARE | Facility: CLINIC | Age: 40
End: 2025-07-03
Payer: COMMERCIAL

## 2025-07-03 DIAGNOSIS — D72.829 LEUKOCYTOSIS, UNSPECIFIED TYPE: ICD-10-CM

## 2025-07-03 DIAGNOSIS — D72.9 ABNORMAL NEUTROPHIL COUNT: ICD-10-CM

## 2025-07-03 RX ORDER — CLINDAMYCIN HYDROCHLORIDE 300 MG/1
300 CAPSULE ORAL 2 TIMES DAILY
Qty: 20 CAPSULE | Refills: 0 | Status: SHIPPED | OUTPATIENT
Start: 2025-07-03 | End: 2025-07-13

## 2025-07-03 RX ORDER — FLUCONAZOLE 150 MG/1
150 TABLET ORAL ONCE
Qty: 2 TABLET | Refills: 0 | Status: SHIPPED | OUTPATIENT
Start: 2025-07-03 | End: 2025-07-03

## 2025-07-11 ENCOUNTER — TELEPHONE (OUTPATIENT)
Dept: OBSTETRICS AND GYNECOLOGY | Facility: CLINIC | Age: 40
End: 2025-07-11
Payer: COMMERCIAL

## 2025-07-11 NOTE — TELEPHONE ENCOUNTER
She should take a daily probiotic with this medication. That should help with her stools. If her sympotms worsen, she may need to discontinue the antibiotic.

## 2025-07-11 NOTE — TELEPHONE ENCOUNTER
Patient is taking Clindamycin. She has 11 pills left, pt feels it is helping with her vagina symptoms.    Patient is complaining of loose stools. She has had 3 BM today.   Advise?

## 2025-07-13 ASSESSMENT — ENCOUNTER SYMPTOMS
CHILLS: 0
DYSURIA: 0
FEVER: 0

## 2025-07-17 ENCOUNTER — APPOINTMENT (OUTPATIENT)
Dept: HEMATOLOGY/ONCOLOGY | Facility: CLINIC | Age: 40
End: 2025-07-17
Payer: COMMERCIAL

## 2025-07-18 ENCOUNTER — TELEPHONE (OUTPATIENT)
Dept: NEUROLOGY | Facility: CLINIC | Age: 40
End: 2025-07-18
Payer: COMMERCIAL

## 2025-07-18 NOTE — TELEPHONE ENCOUNTER
Pt had reaction to previous Ajovy injection; was told to take Benadryl; almost time for her next injection and pt wants to know if she should proceed with it?    Tel# [phone]

## 2025-07-23 ENCOUNTER — APPOINTMENT (OUTPATIENT)
Dept: OBSTETRICS AND GYNECOLOGY | Facility: CLINIC | Age: 40
End: 2025-07-23
Payer: COMMERCIAL

## 2025-08-04 ENCOUNTER — APPOINTMENT (OUTPATIENT)
Dept: PRIMARY CARE | Facility: CLINIC | Age: 40
End: 2025-08-04
Payer: COMMERCIAL

## 2025-08-04 VITALS
BODY MASS INDEX: 20.41 KG/M2 | HEART RATE: 86 BPM | WEIGHT: 127 LBS | SYSTOLIC BLOOD PRESSURE: 112 MMHG | OXYGEN SATURATION: 96 % | HEIGHT: 66 IN | DIASTOLIC BLOOD PRESSURE: 70 MMHG

## 2025-08-04 DIAGNOSIS — R00.2 PALPITATIONS: ICD-10-CM

## 2025-08-04 DIAGNOSIS — F17.210 CIGARETTE NICOTINE DEPENDENCE WITHOUT COMPLICATION: ICD-10-CM

## 2025-08-04 DIAGNOSIS — R19.7 DIARRHEA, UNSPECIFIED TYPE: ICD-10-CM

## 2025-08-04 DIAGNOSIS — R73.03 PRE-DIABETES: ICD-10-CM

## 2025-08-04 DIAGNOSIS — Z76.89 ENCOUNTER TO ESTABLISH CARE WITH NEW PROVIDER: Primary | ICD-10-CM

## 2025-08-04 PROCEDURE — 3008F BODY MASS INDEX DOCD: CPT

## 2025-08-04 PROCEDURE — 99213 OFFICE O/P EST LOW 20 MIN: CPT

## 2025-08-04 ASSESSMENT — ANXIETY QUESTIONNAIRES
1. FEELING NERVOUS, ANXIOUS, OR ON EDGE: NEARLY EVERY DAY
5. BEING SO RESTLESS THAT IT IS HARD TO SIT STILL: NEARLY EVERY DAY
4. TROUBLE RELAXING: NEARLY EVERY DAY
6. BECOMING EASILY ANNOYED OR IRRITABLE: NEARLY EVERY DAY
GAD7 TOTAL SCORE: 20
7. FEELING AFRAID AS IF SOMETHING AWFUL MIGHT HAPPEN: NEARLY EVERY DAY
IF YOU CHECKED OFF ANY PROBLEMS ON THIS QUESTIONNAIRE, HOW DIFFICULT HAVE THESE PROBLEMS MADE IT FOR YOU TO DO YOUR WORK, TAKE CARE OF THINGS AT HOME, OR GET ALONG WITH OTHER PEOPLE: EXTREMELY DIFFICULT
2. NOT BEING ABLE TO STOP OR CONTROL WORRYING: MORE THAN HALF THE DAYS
3. WORRYING TOO MUCH ABOUT DIFFERENT THINGS: NEARLY EVERY DAY

## 2025-08-04 ASSESSMENT — PATIENT HEALTH QUESTIONNAIRE - PHQ9
9. THOUGHTS THAT YOU WOULD BE BETTER OFF DEAD, OR OF HURTING YOURSELF: NOT AT ALL
SUM OF ALL RESPONSES TO PHQ QUESTIONS 1-9: 20
1. LITTLE INTEREST OR PLEASURE IN DOING THINGS: MORE THAN HALF THE DAYS
SUM OF ALL RESPONSES TO PHQ9 QUESTIONS 1 AND 2: 4
5. POOR APPETITE OR OVEREATING: NEARLY EVERY DAY
3. TROUBLE FALLING OR STAYING ASLEEP OR SLEEPING TOO MUCH: NEARLY EVERY DAY
7. TROUBLE CONCENTRATING ON THINGS, SUCH AS READING THE NEWSPAPER OR WATCHING TELEVISION: NEARLY EVERY DAY
8. MOVING OR SPEAKING SO SLOWLY THAT OTHER PEOPLE COULD HAVE NOTICED. OR THE OPPOSITE, BEING SO FIGETY OR RESTLESS THAT YOU HAVE BEEN MOVING AROUND A LOT MORE THAN USUAL: SEVERAL DAYS
2. FEELING DOWN, DEPRESSED OR HOPELESS: MORE THAN HALF THE DAYS
6. FEELING BAD ABOUT YOURSELF - OR THAT YOU ARE A FAILURE OR HAVE LET YOURSELF OR YOUR FAMILY DOWN: NEARLY EVERY DAY
4. FEELING TIRED OR HAVING LITTLE ENERGY: NEARLY EVERY DAY

## 2025-08-04 ASSESSMENT — ENCOUNTER SYMPTOMS
NAUSEA: 1
CHILLS: 1
VOMITING: 0
PALPITATIONS: 1
DEPRESSION: 1
LOSS OF SENSATION IN FEET: 0
LIGHT-HEADEDNESS: 0
SHORTNESS OF BREATH: 0
BLOOD IN STOOL: 0
DIZZINESS: 0
ABDOMINAL PAIN: 1
FATIGUE: 1
DIARRHEA: 1
OCCASIONAL FEELINGS OF UNSTEADINESS: 1
FEVER: 0

## 2025-08-04 ASSESSMENT — COLUMBIA-SUICIDE SEVERITY RATING SCALE - C-SSRS
1. IN THE PAST MONTH, HAVE YOU WISHED YOU WERE DEAD OR WISHED YOU COULD GO TO SLEEP AND NOT WAKE UP?: NO
6. HAVE YOU EVER DONE ANYTHING, STARTED TO DO ANYTHING, OR PREPARED TO DO ANYTHING TO END YOUR LIFE?: NO
2. HAVE YOU ACTUALLY HAD ANY THOUGHTS OF KILLING YOURSELF?: NO

## 2025-08-04 NOTE — PROGRESS NOTES
Subjective   Patient ID: Rosibel Staton is a 40 y.o. female who presents for New Patient Visit (Here to [facility]/Thinks she has CDIFF - states she was on clindamycin - stopped it 2 weeks ago. ).    HPI   39 yo female presents to establish care. PMH of cervical radiculitis, chronic migraine, JEREMIAH, fatigue, endometriosis, ovarian remnant syndrome, HLD, depression, nicotine use, marijuana use, PTSD, syncope, vit D deficiency presents to establish care. Completed antibiotic for BV like two weeks ago and has been having diarrhea since. Reports history of C.diff and wants to get tested for that. Informs having watery, mucous and soft stool, and sometimes cannot make it to the restroom. Having 7-8 episodes of diarrhea but no blood in stool. Denies fever but does gets chills which she says she is always cold anyways. Admits to smoking 5 cigarettes a day now, used to smoke like 2 packs. Hx of leukoplakia of throat and Mild squamous dysplasia and aware she needs to quit smoking.     Will be seeing hematology for chronically elevated WBC tomorrow. Follows with Neurology for migraines and post concussion syndrome. Sees Psych, Dr. Milton. Follows with OBGYN. Follows with Cardiology for palpitations, currently utilizing personal heart monitor to record HR and will be following with cardiology, Dr. Wilkins. Also, follows with lung clinic for pulmonary nodule. Gets MRI of breast done through OBGYN.     Review of Systems   Constitutional:  Positive for chills and fatigue. Negative for fever.   Respiratory:  Negative for shortness of breath.    Cardiovascular:  Positive for palpitations. Negative for chest pain.   Gastrointestinal:  Positive for abdominal pain, diarrhea and nausea. Negative for blood in stool and vomiting.        Has chronic intermittent nausea with migraine   Neurological:  Negative for dizziness and light-headedness.       Objective   /70 (BP Location: Right arm, Patient Position: Sitting)   Pulse 86   Ht  "1.664 m (5' 5.5\")   Wt 57.6 kg (127 lb)   SpO2 96%   BMI 20.81 kg/m²     Physical Exam  Constitutional:       Appearance: Normal appearance.   HENT:      Head: Normocephalic.      Right Ear: Tympanic membrane normal.      Left Ear: Tympanic membrane normal.      Mouth/Throat:      Mouth: Mucous membranes are moist.      Pharynx: No oropharyngeal exudate.     Eyes:      Conjunctiva/sclera: Conjunctivae normal.       Cardiovascular:      Rate and Rhythm: Normal rate and regular rhythm.   Pulmonary:      Effort: Pulmonary effort is normal.      Breath sounds: Normal breath sounds.   Abdominal:      General: Bowel sounds are normal.      Palpations: Abdomen is soft.      Tenderness: There is abdominal tenderness.      Comments: RUQ tenderness       Musculoskeletal:      Right lower leg: No edema.      Left lower leg: No edema.     Skin:     General: Skin is warm and dry.     Neurological:      Mental Status: She is alert and oriented to person, place, and time.     Psychiatric:         Mood and Affect: Mood normal.         Behavior: Behavior normal.         Assessment & Plan  Encounter to establish care with new provider  -medications and patient history reviewed  -Patient is established with various specialties and up to date with preventive health screenings  -Necessary labs ordered       Diarrhea, unspecified type  -With recent use of antibiotic and history of C.diff, will test and rule that out and obtain labs first & hold off on imaging   -Denies history of recent travels or sick contact  -Advised hydration, hand hygiene and precautions at home, probiotics  -Aware of signs & symptoms that warrant ED visit  Orders:    C. difficile, PCR    Comprehensive metabolic panel; Future    CBC and Auto Differential; Future    Pre-diabetes  Lab Results   Component Value Date    HGBA1C 5.7 (H) 08/22/2024    HGBA1C 6.0 (A) 08/30/2023     -Will recheck A1c  -Eat healthy diet and exercise   Orders:    Hemoglobin A1C; Future    " Lipid Panel; Future    Follow Up In [facility] - PCP - Established; Future    Cigarette nicotine dependence without complication  -Encouraged & advised complete smoking cessation        Palpitations  -Following with cardiology, had EKG in June and currently doing ambulatory heart monitor  -vitals stable in office

## 2025-08-04 NOTE — ASSESSMENT & PLAN NOTE
-Following with cardiology, had EKG in June and currently doing ambulatory heart monitor  -vitals stable in office

## 2025-08-05 ENCOUNTER — OFFICE VISIT (OUTPATIENT)
Dept: HEMATOLOGY/ONCOLOGY | Facility: CLINIC | Age: 40
End: 2025-08-05
Payer: COMMERCIAL

## 2025-08-05 VITALS
SYSTOLIC BLOOD PRESSURE: 100 MMHG | TEMPERATURE: 97.3 F | HEART RATE: 80 BPM | RESPIRATION RATE: 16 BRPM | DIASTOLIC BLOOD PRESSURE: 69 MMHG | BODY MASS INDEX: 21.32 KG/M2 | WEIGHT: 127.98 LBS | OXYGEN SATURATION: 97 % | HEIGHT: 65 IN

## 2025-08-05 DIAGNOSIS — A04.72 CLOSTRIDIUM DIFFICILE DIARRHEA: Primary | ICD-10-CM

## 2025-08-05 DIAGNOSIS — D72.829 LEUKOCYTOSIS, UNSPECIFIED TYPE: Primary | ICD-10-CM

## 2025-08-05 PROCEDURE — 99215 OFFICE O/P EST HI 40 MIN: CPT | Performed by: NURSE PRACTITIONER

## 2025-08-05 PROCEDURE — 3008F BODY MASS INDEX DOCD: CPT | Performed by: NURSE PRACTITIONER

## 2025-08-05 RX ORDER — VANCOMYCIN HYDROCHLORIDE 125 MG/1
125 CAPSULE ORAL 4 TIMES DAILY
Qty: 40 CAPSULE | Refills: 0 | Status: SHIPPED | OUTPATIENT
Start: 2025-08-05 | End: 2025-08-15

## 2025-08-05 ASSESSMENT — PAIN SCALES - GENERAL: PAINLEVEL_OUTOF10: 7
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
## 2025-08-06 ENCOUNTER — TELEPHONE (OUTPATIENT)
Dept: PRIMARY CARE | Facility: CLINIC | Age: 40
End: 2025-08-06
Payer: COMMERCIAL

## 2025-08-06 PROBLEM — K13.21 LEUKOPLAKIA OF ORAL CAVITY: Status: ACTIVE | Noted: 2025-08-06

## 2025-08-06 LAB
C DIFF TOX GENS STL QL NAA+PROBE: DETECTED
Lab: ABNORMAL

## 2025-08-06 ASSESSMENT — ENCOUNTER SYMPTOMS
HEADACHES: 1
CARDIOVASCULAR NEGATIVE: 1
DIZZINESS: 1
MUSCULOSKELETAL NEGATIVE: 1
FATIGUE: 1
RESPIRATORY NEGATIVE: 1
LIGHT-HEADEDNESS: 1
BRUISES/BLEEDS EASILY: 1
GASTROINTESTINAL NEGATIVE: 1
NERVOUS/ANXIOUS: 1
UNEXPECTED WEIGHT CHANGE: 1

## 2025-08-06 NOTE — TELEPHONE ENCOUNTER
PT informed of results for Cdiff Positive Per Andrae , and Treatment was Initiated , Pts concerns were for her work and FMLA was advised we can write for FMLA for 10 days d/t her losing her job at [employer] . Weill wait for fax from [facility] to fill out for 8/6/25-8/16/2025

## 2025-08-07 ENCOUNTER — APPOINTMENT (OUTPATIENT)
Dept: BEHAVIORAL HEALTH | Facility: CLINIC | Age: 40
End: 2025-08-07
Payer: COMMERCIAL

## 2025-08-07 ENCOUNTER — HOSPITAL ENCOUNTER (OUTPATIENT)
Dept: RADIOLOGY | Facility: CLINIC | Age: 40
Discharge: HOME | End: 2025-08-07
Payer: COMMERCIAL

## 2025-08-07 ENCOUNTER — LAB (OUTPATIENT)
Dept: LAB | Facility: HOSPITAL | Age: 40
End: 2025-08-07
Payer: COMMERCIAL

## 2025-08-07 DIAGNOSIS — D72.829 ELEVATED WHITE BLOOD CELL COUNT, UNSPECIFIED: Primary | ICD-10-CM

## 2025-08-07 DIAGNOSIS — R59.1 GENERALIZED ENLARGED LYMPH NODES: ICD-10-CM

## 2025-08-07 LAB
ALBUMIN SERPL BCP-MCNC: 4.4 G/DL (ref 3.4–5)
ALP SERPL-CCNC: 55 U/L (ref 33–110)
ALT SERPL W P-5'-P-CCNC: 10 U/L (ref 7–45)
ANION GAP SERPL CALC-SCNC: 11 MMOL/L (ref 10–20)
APTT PPP: 33 SECONDS (ref 26–36)
AST SERPL W P-5'-P-CCNC: 13 U/L (ref 9–39)
BASOPHILS # BLD AUTO: 0.06 X10*3/UL (ref 0–0.1)
BASOPHILS NFR BLD AUTO: 0.6 %
BILIRUB SERPL-MCNC: 0.4 MG/DL (ref 0–1.2)
BUN SERPL-MCNC: 8 MG/DL (ref 6–23)
CALCIUM SERPL-MCNC: 9.4 MG/DL (ref 8.6–10.3)
CHLORIDE SERPL-SCNC: 104 MMOL/L (ref 98–107)
CO2 SERPL-SCNC: 27 MMOL/L (ref 21–32)
CREAT SERPL-MCNC: 0.64 MG/DL (ref 0.5–1.05)
CRP SERPL-MCNC: 0.18 MG/DL
EGFRCR SERPLBLD CKD-EPI 2021: >90 ML/MIN/1.73M*2
EOSINOPHIL # BLD AUTO: 0.17 X10*3/UL (ref 0–0.7)
EOSINOPHIL NFR BLD AUTO: 1.7 %
ERYTHROCYTE [DISTWIDTH] IN BLOOD BY AUTOMATED COUNT: 12 % (ref 11.5–14.5)
ERYTHROCYTE [SEDIMENTATION RATE] IN BLOOD BY WESTERGREN METHOD: 3 MM/H (ref 0–20)
GLUCOSE SERPL-MCNC: 86 MG/DL (ref 74–99)
HCT VFR BLD AUTO: 42.7 % (ref 36–46)
HGB BLD-MCNC: 14.3 G/DL (ref 12–16)
IMM GRANULOCYTES # BLD AUTO: 0.03 X10*3/UL (ref 0–0.7)
IMM GRANULOCYTES NFR BLD AUTO: 0.3 % (ref 0–0.9)
INR PPP: 1 (ref 0.9–1.1)
LYMPHOCYTES # BLD AUTO: 2.18 X10*3/UL (ref 1.2–4.8)
LYMPHOCYTES NFR BLD AUTO: 22 %
MCH RBC QN AUTO: 30 PG (ref 26–34)
MCHC RBC AUTO-ENTMCNC: 33.5 G/DL (ref 32–36)
MCV RBC AUTO: 90 FL (ref 80–100)
MONOCYTES # BLD AUTO: 0.63 X10*3/UL (ref 0.1–1)
MONOCYTES NFR BLD AUTO: 6.3 %
NEUTROPHILS # BLD AUTO: 6.86 X10*3/UL (ref 1.2–7.7)
NEUTROPHILS NFR BLD AUTO: 69.1 %
NRBC BLD-RTO: 0 /100 WBCS (ref 0–0)
PLATELET # BLD AUTO: 263 X10*3/UL (ref 150–450)
POTASSIUM SERPL-SCNC: 4.2 MMOL/L (ref 3.5–5.3)
PROT SERPL-MCNC: 6.7 G/DL (ref 6.4–8.2)
PROTHROMBIN TIME: 11.2 SECONDS (ref 9.8–12.4)
RBC # BLD AUTO: 4.77 X10*6/UL (ref 4–5.2)
SODIUM SERPL-SCNC: 138 MMOL/L (ref 136–145)
WBC # BLD AUTO: 9.9 X10*3/UL (ref 4.4–11.3)

## 2025-08-07 PROCEDURE — 86140 C-REACTIVE PROTEIN: CPT

## 2025-08-07 PROCEDURE — 82746 ASSAY OF FOLIC ACID SERUM: CPT

## 2025-08-07 PROCEDURE — 85240 CLOT FACTOR VIII AHG 1 STAGE: CPT

## 2025-08-07 PROCEDURE — 36415 COLL VENOUS BLD VENIPUNCTURE: CPT

## 2025-08-07 PROCEDURE — 88271 CYTOGENETICS DNA PROBE: CPT

## 2025-08-07 PROCEDURE — 85247 CLOT FACTOR VIII MULTIMETRIC: CPT

## 2025-08-07 PROCEDURE — 88275 CYTOGENETICS 100-300: CPT

## 2025-08-07 PROCEDURE — 84165 PROTEIN E-PHORESIS SERUM: CPT

## 2025-08-07 PROCEDURE — 82784 ASSAY IGA/IGD/IGG/IGM EACH: CPT

## 2025-08-07 PROCEDURE — 82180 ASSAY OF ASCORBIC ACID: CPT

## 2025-08-07 PROCEDURE — 85610 PROTHROMBIN TIME: CPT

## 2025-08-07 PROCEDURE — 82607 VITAMIN B-12: CPT

## 2025-08-07 PROCEDURE — 83520 IMMUNOASSAY QUANT NOS NONAB: CPT

## 2025-08-07 PROCEDURE — 85245 CLOT FACTOR VIII VW RISTOCTN: CPT

## 2025-08-07 PROCEDURE — 71250 CT THORAX DX C-: CPT

## 2025-08-07 PROCEDURE — 85652 RBC SED RATE AUTOMATED: CPT

## 2025-08-07 PROCEDURE — 86038 ANTINUCLEAR ANTIBODIES: CPT

## 2025-08-07 PROCEDURE — 80053 COMPREHEN METABOLIC PANEL: CPT

## 2025-08-07 PROCEDURE — 85025 COMPLETE CBC W/AUTO DIFF WBC: CPT

## 2025-08-07 PROCEDURE — 83521 IG LIGHT CHAINS FREE EACH: CPT

## 2025-08-07 PROCEDURE — 88237 TISSUE CULTURE BONE MARROW: CPT

## 2025-08-07 PROCEDURE — 84155 ASSAY OF PROTEIN SERUM: CPT

## 2025-08-07 PROCEDURE — 85730 THROMBOPLASTIN TIME PARTIAL: CPT

## 2025-08-07 PROCEDURE — 85246 CLOT FACTOR VIII VW ANTIGEN: CPT

## 2025-08-07 PROCEDURE — 85397 CLOTTING FUNCT ACTIVITY: CPT

## 2025-08-08 LAB
ALBUMIN SERPL-MCNC: 4.6 G/DL (ref 3.6–5.1)
ALP SERPL-CCNC: 58 U/L (ref 31–125)
ALT SERPL-CCNC: 10 U/L (ref 6–29)
ANA SER QL HEP2 SUBST: NEGATIVE
ANION GAP SERPL CALCULATED.4IONS-SCNC: 10 MMOL/L (CALC) (ref 7–17)
AST SERPL-CCNC: 13 U/L (ref 10–30)
BASOPHILS # BLD AUTO: 57 CELLS/UL (ref 0–200)
BASOPHILS NFR BLD AUTO: 0.6 %
BILIRUB SERPL-MCNC: 0.4 MG/DL (ref 0.2–1.2)
BUN SERPL-MCNC: 8 MG/DL (ref 7–25)
CALCIUM SERPL-MCNC: 9.4 MG/DL (ref 8.6–10.2)
CHLORIDE SERPL-SCNC: 104 MMOL/L (ref 98–110)
CHOLEST SERPL-MCNC: 185 MG/DL
CHOLEST/HDLC SERPL: 2.9 (CALC)
CO2 SERPL-SCNC: 27 MMOL/L (ref 20–32)
CREAT SERPL-MCNC: 0.68 MG/DL (ref 0.5–0.99)
EGFRCR SERPLBLD CKD-EPI 2021: 112 ML/MIN/1.73M2
EOSINOPHIL # BLD AUTO: 162 CELLS/UL (ref 15–500)
EOSINOPHIL NFR BLD AUTO: 1.7 %
ERYTHROCYTE [DISTWIDTH] IN BLOOD BY AUTOMATED COUNT: 11.9 % (ref 11–15)
EST. AVERAGE GLUCOSE BLD GHB EST-MCNC: 114 MG/DL
EST. AVERAGE GLUCOSE BLD GHB EST-SCNC: 6.3 MMOL/L
FACT VIII ACT/NOR PPP: 128 % (ref 55–180)
FOLATE SERPL-MCNC: 5.4 NG/ML
GLUCOSE SERPL-MCNC: 84 MG/DL (ref 65–99)
HBA1C MFR BLD: 5.6 %
HCT VFR BLD AUTO: 42.6 % (ref 35–45)
HDLC SERPL-MCNC: 63 MG/DL
HGB BLD-MCNC: 14.2 G/DL (ref 11.7–15.5)
IGA SERPL-MCNC: 87 MG/DL (ref 70–400)
IGG SERPL-MCNC: 759 MG/DL (ref 700–1600)
IGM SERPL-MCNC: 237 MG/DL (ref 40–230)
KAPPA LC SERPL-MCNC: 1.16 MG/DL (ref 0.33–1.94)
KAPPA LC/LAMBDA SER: 1.09 {RATIO} (ref 0.26–1.65)
LAMBDA LC SERPL-MCNC: 1.06 MG/DL (ref 0.57–2.63)
LDLC SERPL CALC-MCNC: 108 MG/DL (CALC)
LYMPHOCYTES # BLD AUTO: 2147 CELLS/UL (ref 850–3900)
LYMPHOCYTES NFR BLD AUTO: 22.6 %
MCH RBC QN AUTO: 29.9 PG (ref 27–33)
MCHC RBC AUTO-ENTMCNC: 33.3 G/DL (ref 32–36)
MCV RBC AUTO: 89.7 FL (ref 80–100)
MONOCYTES # BLD AUTO: 637 CELLS/UL (ref 200–950)
MONOCYTES NFR BLD AUTO: 6.7 %
NEUTROPHILS # BLD AUTO: 6498 CELLS/UL (ref 1500–7800)
NEUTROPHILS NFR BLD AUTO: 68.4 %
NONHDLC SERPL-MCNC: 122 MG/DL (CALC)
PLATELET # BLD AUTO: 257 THOUSAND/UL (ref 140–400)
PMV BLD REES-ECKER: 11 FL (ref 7.5–12.5)
POTASSIUM SERPL-SCNC: 4.2 MMOL/L (ref 3.5–5.3)
PROT SERPL-MCNC: 6.8 G/DL (ref 6.4–8.2)
PROT SERPL-MCNC: 6.9 G/DL (ref 6.1–8.1)
RBC # BLD AUTO: 4.75 MILLION/UL (ref 3.8–5.1)
SODIUM SERPL-SCNC: 141 MMOL/L (ref 135–146)
TRIGL SERPL-MCNC: 62 MG/DL
VIT B12 SERPL-MCNC: 311 PG/ML (ref 211–911)
VWF AG ACT/NOR PPP IA: 141 % (ref 50–220)
WBC # BLD AUTO: 9.5 THOUSAND/UL (ref 3.8–10.8)

## 2025-08-10 LAB — VWF:RCO ACT/NOR PPP PL AGG: 136 % (ref 51–215)

## 2025-08-11 ENCOUNTER — TELEPHONE (OUTPATIENT)
Dept: PRIMARY CARE | Facility: CLINIC | Age: 40
End: 2025-08-11
Payer: COMMERCIAL

## 2025-08-11 LAB
ALBUMIN: 4.3 G/DL (ref 3.4–5)
ALPHA 1 GLOBULIN: 0.3 G/DL (ref 0.2–0.6)
ALPHA 2 GLOBULIN: 0.8 G/DL (ref 0.4–1.1)
BETA GLOBULIN: 0.6 G/DL (ref 0.5–1.2)
GAMMA GLOBULIN: 0.8 G/DL (ref 0.5–1.4)
PATH REVIEW-SERUM PROTEIN ELECTROPHORESIS: NORMAL
PROTEIN ELECTROPHORESIS COMMENT: NORMAL

## 2025-08-12 LAB — VIT C SERPL-MCNC: <5 UMOL/L (ref 23–114)

## 2025-08-13 LAB
CHROM ANALY OVERALL INTERP-IMP: NORMAL
ELECTRONICALLY COSIGNED BY CYTOGENETICS: NORMAL
ELECTRONICALLY SIGNED BY CYTOGENETICS: NORMAL
FISH ISCN RESULTS: NORMAL
VWF CBA INHIBITOR PPP CHRO-ACNC: 183 IU/DL (ref 50–203)

## 2025-08-14 LAB — VWF GP1BM ACTIVITY: NORMAL

## 2025-08-18 ENCOUNTER — TELEPHONE (OUTPATIENT)
Dept: CARDIOLOGY | Facility: HOSPITAL | Age: 40
End: 2025-08-18
Payer: COMMERCIAL

## 2025-08-19 ENCOUNTER — TELEMEDICINE (OUTPATIENT)
Dept: HEMATOLOGY/ONCOLOGY | Facility: CLINIC | Age: 40
End: 2025-08-19
Payer: COMMERCIAL

## 2025-08-19 ENCOUNTER — TELEPHONE (OUTPATIENT)
Dept: HEMATOLOGY/ONCOLOGY | Facility: CLINIC | Age: 40
End: 2025-08-19

## 2025-08-19 DIAGNOSIS — R76.8 HIGH TOTAL SERUM IGM: ICD-10-CM

## 2025-08-19 DIAGNOSIS — D72.829 LEUKOCYTOSIS, UNSPECIFIED TYPE: ICD-10-CM

## 2025-08-19 DIAGNOSIS — E53.8 B12 DEFICIENCY: Primary | ICD-10-CM

## 2025-08-19 DIAGNOSIS — E53.8 FOLIC ACID DEFICIENCY: ICD-10-CM

## 2025-08-19 PROCEDURE — 99214 OFFICE O/P EST MOD 30 MIN: CPT | Performed by: NURSE PRACTITIONER

## 2025-08-19 RX ORDER — FOLIC ACID 1 MG/1
1 TABLET ORAL DAILY
Qty: 90 TABLET | Refills: 1 | Status: SHIPPED | OUTPATIENT
Start: 2025-08-19 | End: 2026-02-15

## 2025-08-19 RX ORDER — MAGNESIUM 200 MG
1 TABLET ORAL DAILY
Qty: 90 TABLET | Refills: 1 | Status: SHIPPED | OUTPATIENT
Start: 2025-08-19 | End: 2026-02-15

## 2025-08-19 RX ORDER — ASCORBIC ACID 500 MG
1000 TABLET ORAL DAILY
Qty: 60 TABLET | Refills: 11 | Status: SHIPPED | OUTPATIENT
Start: 2025-08-19 | End: 2026-08-19

## 2025-08-19 ASSESSMENT — ENCOUNTER SYMPTOMS
BRUISES/BLEEDS EASILY: 1
MUSCULOSKELETAL NEGATIVE: 1
DIZZINESS: 1
HEADACHES: 1
NERVOUS/ANXIOUS: 1
RESPIRATORY NEGATIVE: 1
FATIGUE: 1
LIGHT-HEADEDNESS: 1
CARDIOVASCULAR NEGATIVE: 1
GASTROINTESTINAL NEGATIVE: 1
UNEXPECTED WEIGHT CHANGE: 1

## 2025-08-20 ENCOUNTER — APPOINTMENT (OUTPATIENT)
Dept: LAB | Facility: HOSPITAL | Age: 40
End: 2025-08-20
Payer: COMMERCIAL

## 2025-08-20 ENCOUNTER — APPOINTMENT (OUTPATIENT)
Dept: BEHAVIORAL HEALTH | Facility: CLINIC | Age: 40
End: 2025-08-20
Payer: COMMERCIAL

## 2025-08-20 DIAGNOSIS — F33.1 MODERATE EPISODE OF RECURRENT MAJOR DEPRESSIVE DISORDER: ICD-10-CM

## 2025-08-20 DIAGNOSIS — F41.1 GENERALIZED ANXIETY DISORDER: ICD-10-CM

## 2025-08-20 DIAGNOSIS — R11.0 NAUSEA: ICD-10-CM

## 2025-08-20 DIAGNOSIS — F43.10 PTSD (POST-TRAUMATIC STRESS DISORDER): Primary | ICD-10-CM

## 2025-08-20 PROCEDURE — 99214 OFFICE O/P EST MOD 30 MIN: CPT | Performed by: STUDENT IN AN ORGANIZED HEALTH CARE EDUCATION/TRAINING PROGRAM

## 2025-08-20 RX ORDER — CLONAZEPAM 1 MG/1
1 TABLET, ORALLY DISINTEGRATING ORAL 2 TIMES DAILY PRN
Qty: 20 TABLET | Refills: 0 | Status: SHIPPED | OUTPATIENT
Start: 2025-08-20 | End: 2025-08-30

## 2025-08-20 RX ORDER — ONDANSETRON 8 MG/1
8 TABLET, ORALLY DISINTEGRATING ORAL EVERY 8 HOURS PRN
Qty: 20 TABLET | Refills: 0 | Status: SHIPPED | OUTPATIENT
Start: 2025-08-20 | End: 2025-08-27

## 2025-08-21 LAB — VWF MULTIMERS PPP IB: NORMAL

## 2025-08-25 ENCOUNTER — TELEPHONE (OUTPATIENT)
Dept: PRIMARY CARE | Facility: CLINIC | Age: 40
End: 2025-08-25
Payer: COMMERCIAL

## 2025-08-25 DIAGNOSIS — A04.72 CLOSTRIDIUM DIFFICILE DIARRHEA: ICD-10-CM

## 2025-08-25 DIAGNOSIS — R11.0 NAUSEA: ICD-10-CM

## 2025-08-25 DIAGNOSIS — A09 DIARRHEA OF INFECTIOUS ORIGIN: Primary | ICD-10-CM

## 2025-08-27 ENCOUNTER — TELEPHONE (OUTPATIENT)
Dept: HEMATOLOGY/ONCOLOGY | Facility: CLINIC | Age: 40
End: 2025-08-27
Payer: COMMERCIAL

## 2025-08-27 ENCOUNTER — LAB (OUTPATIENT)
Dept: LAB | Facility: CLINIC | Age: 40
End: 2025-08-27
Payer: COMMERCIAL

## 2025-08-27 DIAGNOSIS — E53.8 B12 DEFICIENCY: ICD-10-CM

## 2025-08-27 DIAGNOSIS — D72.829 LEUKOCYTOSIS, UNSPECIFIED TYPE: ICD-10-CM

## 2025-08-27 DIAGNOSIS — E53.8 FOLIC ACID DEFICIENCY: ICD-10-CM

## 2025-08-27 LAB
25(OH)D3 SERPL-MCNC: 30 NG/ML (ref 30–100)
BASOPHILS # BLD AUTO: 0.07 X10*3/UL (ref 0–0.1)
BASOPHILS NFR BLD AUTO: 0.4 %
EOSINOPHIL # BLD AUTO: 0.23 X10*3/UL (ref 0–0.7)
EOSINOPHIL NFR BLD AUTO: 1.3 %
ERYTHROCYTE [DISTWIDTH] IN BLOOD BY AUTOMATED COUNT: 12.2 % (ref 11.5–14.5)
GLIADIN PEPTIDE IGA SER IA-ACNC: <1 U/ML
HCT VFR BLD AUTO: 45.1 % (ref 36–46)
HGB BLD-MCNC: 14.8 G/DL (ref 12–16)
IMM GRANULOCYTES # BLD AUTO: 0.12 X10*3/UL (ref 0–0.7)
IMM GRANULOCYTES NFR BLD AUTO: 0.7 % (ref 0–0.9)
LYMPHOCYTES # BLD AUTO: 2.42 X10*3/UL (ref 1.2–4.8)
LYMPHOCYTES NFR BLD AUTO: 13.7 %
MCH RBC QN AUTO: 29.7 PG (ref 26–34)
MCHC RBC AUTO-ENTMCNC: 32.8 G/DL (ref 32–36)
MCV RBC AUTO: 90 FL (ref 80–100)
MONOCYTES # BLD AUTO: 0.75 X10*3/UL (ref 0.1–1)
MONOCYTES NFR BLD AUTO: 4.3 %
NEUTROPHILS # BLD AUTO: 14.03 X10*3/UL (ref 1.2–7.7)
NEUTROPHILS NFR BLD AUTO: 79.6 %
NRBC BLD-RTO: 0 /100 WBCS (ref 0–0)
PATH REVIEW-CBC DIFFERENTIAL: NORMAL
PLATELET # BLD AUTO: 284 X10*3/UL (ref 150–450)
RBC # BLD AUTO: 4.99 X10*6/UL (ref 4–5.2)
TTG IGA SER IA-ACNC: <1 U/ML
WBC # BLD AUTO: 17.6 X10*3/UL (ref 4.4–11.3)

## 2025-08-27 PROCEDURE — 83516 IMMUNOASSAY NONANTIBODY: CPT | Mod: PARLAB

## 2025-08-27 PROCEDURE — 85025 COMPLETE CBC W/AUTO DIFF WBC: CPT

## 2025-08-27 PROCEDURE — 36415 COLL VENOUS BLD VENIPUNCTURE: CPT

## 2025-08-27 PROCEDURE — 82306 VITAMIN D 25 HYDROXY: CPT | Mod: PARLAB

## 2025-08-27 PROCEDURE — 88185 FLOWCYTOMETRY/TC ADD-ON: CPT | Mod: TC,PARLAB | Performed by: NURSE PRACTITIONER

## 2025-08-27 PROCEDURE — 83921 ORGANIC ACID SINGLE QUANT: CPT

## 2025-08-27 PROCEDURE — 83516 IMMUNOASSAY NONANTIBODY: CPT

## 2025-08-29 LAB
CELL COUNT (BLOOD): 17.6 X10*3/UL
CELL POPULATIONS: NORMAL
DIAGNOSIS: NORMAL
FLOW DIFFERENTIAL: NORMAL
FLOW TEST ORDERED: NORMAL
LAB TEST METHOD: NORMAL
NUMBER OF CELLS COLLECTED: NORMAL PER TUBE
PATH REPORT.TOTAL CANCER: NORMAL
SIGNATURE COMMENT: NORMAL
SPECIMEN VIABILITY: NORMAL

## 2025-08-30 LAB
GLIADIN PEPTIDE IGG SER IA-ACNC: <0.56 FLU (ref 0–4.99)
TTG IGG SER IA-ACNC: <0.82 FLU (ref 0–4.99)

## 2025-08-31 LAB — METHYLMALONATE SERPL-SCNC: 0.15 UMOL/L (ref 0–0.4)

## 2025-09-03 LAB
ELECTRONICALLY SIGNED BY: NORMAL
MYELOID NGS RESULTS: NORMAL

## 2025-09-04 ENCOUNTER — APPOINTMENT (OUTPATIENT)
Dept: RADIOLOGY | Facility: CLINIC | Age: 40
End: 2025-09-04
Payer: COMMERCIAL

## 2025-09-19 ENCOUNTER — APPOINTMENT (OUTPATIENT)
Dept: HEMATOLOGY/ONCOLOGY | Facility: CLINIC | Age: 40
End: 2025-09-19
Payer: COMMERCIAL

## 2025-09-22 ENCOUNTER — APPOINTMENT (OUTPATIENT)
Facility: CLINIC | Age: 40
End: 2025-09-22
Payer: COMMERCIAL

## 2025-10-09 ENCOUNTER — APPOINTMENT (OUTPATIENT)
Dept: OTOLARYNGOLOGY | Facility: CLINIC | Age: 40
End: 2025-10-09
Payer: COMMERCIAL

## 2025-10-15 ENCOUNTER — APPOINTMENT (OUTPATIENT)
Dept: OBSTETRICS AND GYNECOLOGY | Facility: CLINIC | Age: 40
End: 2025-10-15
Payer: COMMERCIAL

## 2025-11-04 ENCOUNTER — APPOINTMENT (OUTPATIENT)
Dept: PRIMARY CARE | Facility: CLINIC | Age: 40
End: 2025-11-04
Payer: COMMERCIAL

## (undated) DEVICE — Device

## (undated) DEVICE — PAD, EYE, OVAL, 1 5/8 X 2 5/8 IN, STERILE

## (undated) DEVICE — TOWEL, SURGICAL, NEURO, O/R, 16 X 26, BLUE, STERILE

## (undated) DEVICE — DRAPE, INSTRUMENT, W/POUCH, STERI DRAPE, 9 5/8 X 18 LONG

## (undated) DEVICE — GLOVE, SURGICAL, PROTEXIS PI , 7.5, PF, LF

## (undated) DEVICE — DENTITION PROTECTOR, QUICKGUARD, NON-PERF

## (undated) DEVICE — SYRINGE, 60 CC, IRRIGATION, BULB, CONTRO-BULB, PAPER POUCH

## (undated) DEVICE — SYRINGE, 1ML, SLIP LUER TIP, W/O NEEDLE, NS

## (undated) DEVICE — ANTIFOG, SOLUTION, FOG-OUT